# Patient Record
Sex: FEMALE | Race: OTHER | HISPANIC OR LATINO | URBAN - METROPOLITAN AREA
[De-identification: names, ages, dates, MRNs, and addresses within clinical notes are randomized per-mention and may not be internally consistent; named-entity substitution may affect disease eponyms.]

---

## 2017-01-02 ENCOUNTER — EMERGENCY (EMERGENCY)
Facility: HOSPITAL | Age: 22
LOS: 0 days | Discharge: HOME | End: 2017-01-02
Admitting: INTERNAL MEDICINE
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
## 2017-06-27 DIAGNOSIS — M54.2 CERVICALGIA: ICD-10-CM
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
## 2017-06-27 DIAGNOSIS — S00.03XA CONTUSION OF SCALP, INITIAL ENCOUNTER: ICD-10-CM

## 2017-06-27 DIAGNOSIS — M54.6 PAIN IN THORACIC SPINE: ICD-10-CM

## 2017-06-27 DIAGNOSIS — R05 COUGH: ICD-10-CM
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
## 2018-07-05 ENCOUNTER — INPATIENT (INPATIENT)
Facility: HOSPITAL | Age: 23
LOS: 2 days | Discharge: HOME | End: 2018-07-08
Attending: SURGERY | Admitting: SURGERY
Payer: COMMERCIAL

## 2018-07-05 VITALS
DIASTOLIC BLOOD PRESSURE: 75 MMHG | SYSTOLIC BLOOD PRESSURE: 135 MMHG | OXYGEN SATURATION: 100 % | RESPIRATION RATE: 18 BRPM | HEART RATE: 109 BPM | TEMPERATURE: 100 F

## 2018-07-05 DIAGNOSIS — K83.0 CHOLANGITIS: ICD-10-CM

## 2018-07-05 LAB
ALBUMIN SERPL ELPH-MCNC: 3.9 G/DL — SIGNIFICANT CHANGE UP (ref 3.5–5.2)
ALBUMIN SERPL ELPH-MCNC: 4.4 G/DL — SIGNIFICANT CHANGE UP (ref 3.5–5.2)
ALP SERPL-CCNC: 123 U/L — HIGH (ref 30–115)
ALP SERPL-CCNC: 135 U/L — HIGH (ref 30–115)
ALT FLD-CCNC: 438 U/L — HIGH (ref 0–41)
ALT FLD-CCNC: 610 U/L — HIGH (ref 0–41)
ANION GAP SERPL CALC-SCNC: 14 MMOL/L — SIGNIFICANT CHANGE UP (ref 7–14)
ANION GAP SERPL CALC-SCNC: 16 MMOL/L — HIGH (ref 7–14)
ANISOCYTOSIS BLD QL: SLIGHT — SIGNIFICANT CHANGE UP
APPEARANCE UR: (no result)
APTT BLD: 30 SEC — SIGNIFICANT CHANGE UP (ref 27–39.2)
AST SERPL-CCNC: 358 U/L — HIGH (ref 0–41)
BASOPHILS # BLD AUTO: 0 K/UL — SIGNIFICANT CHANGE UP (ref 0–0.2)
BASOPHILS # BLD AUTO: 0.02 K/UL — SIGNIFICANT CHANGE UP (ref 0–0.2)
BASOPHILS NFR BLD AUTO: 0 % — SIGNIFICANT CHANGE UP (ref 0–1)
BASOPHILS NFR BLD AUTO: 0.1 % — SIGNIFICANT CHANGE UP (ref 0–1)
BILIRUB DIRECT SERPL-MCNC: 1.1 MG/DL — HIGH (ref 0–0.2)
BILIRUB DIRECT SERPL-MCNC: 2.6 MG/DL — HIGH (ref 0–0.2)
BILIRUB INDIRECT FLD-MCNC: 0.5 MG/DL — SIGNIFICANT CHANGE UP (ref 0.2–1.2)
BILIRUB INDIRECT FLD-MCNC: 0.5 MG/DL — SIGNIFICANT CHANGE UP (ref 0.2–1.2)
BILIRUB SERPL-MCNC: 1.6 MG/DL — HIGH (ref 0.2–1.2)
BILIRUB SERPL-MCNC: 3.1 MG/DL — HIGH (ref 0.2–1.2)
BILIRUB UR-MCNC: NEGATIVE — SIGNIFICANT CHANGE UP
BUN SERPL-MCNC: 11 MG/DL — SIGNIFICANT CHANGE UP (ref 10–20)
BUN SERPL-MCNC: 9 MG/DL — LOW (ref 10–20)
CALCIUM SERPL-MCNC: 8.3 MG/DL — LOW (ref 8.5–10.1)
CALCIUM SERPL-MCNC: 9.3 MG/DL — SIGNIFICANT CHANGE UP (ref 8.5–10.1)
CHLORIDE SERPL-SCNC: 102 MMOL/L — SIGNIFICANT CHANGE UP (ref 98–110)
CHLORIDE SERPL-SCNC: 103 MMOL/L — SIGNIFICANT CHANGE UP (ref 98–110)
CO2 SERPL-SCNC: 24 MMOL/L — SIGNIFICANT CHANGE UP (ref 17–32)
CO2 SERPL-SCNC: 25 MMOL/L — SIGNIFICANT CHANGE UP (ref 17–32)
COLOR SPEC: YELLOW — SIGNIFICANT CHANGE UP
CREAT SERPL-MCNC: 0.6 MG/DL — LOW (ref 0.7–1.5)
CREAT SERPL-MCNC: 0.7 MG/DL — SIGNIFICANT CHANGE UP (ref 0.7–1.5)
DIFF PNL FLD: NEGATIVE — SIGNIFICANT CHANGE UP
ELLIPTOCYTES BLD QL SMEAR: SLIGHT — SIGNIFICANT CHANGE UP
EOSINOPHIL # BLD AUTO: 0 K/UL — SIGNIFICANT CHANGE UP (ref 0–0.7)
EOSINOPHIL # BLD AUTO: 0 K/UL — SIGNIFICANT CHANGE UP (ref 0–0.7)
EOSINOPHIL NFR BLD AUTO: 0 % — SIGNIFICANT CHANGE UP (ref 0–8)
EOSINOPHIL NFR BLD AUTO: 0 % — SIGNIFICANT CHANGE UP (ref 0–8)
GLUCOSE SERPL-MCNC: 119 MG/DL — HIGH (ref 70–99)
GLUCOSE SERPL-MCNC: 125 MG/DL — HIGH (ref 70–99)
GLUCOSE UR QL: NEGATIVE MG/DL — SIGNIFICANT CHANGE UP
HCT VFR BLD CALC: 35.4 % — LOW (ref 37–47)
HCT VFR BLD CALC: 38.7 % — SIGNIFICANT CHANGE UP (ref 37–47)
HGB BLD-MCNC: 11.7 G/DL — LOW (ref 12–16)
HGB BLD-MCNC: 12.7 G/DL — SIGNIFICANT CHANGE UP (ref 12–16)
HYPOCHROMIA BLD QL: SLIGHT — SIGNIFICANT CHANGE UP
IMM GRANULOCYTES NFR BLD AUTO: 0.4 % — HIGH (ref 0.1–0.3)
INR BLD: 1.44 RATIO — HIGH (ref 0.65–1.3)
KETONES UR-MCNC: NEGATIVE — SIGNIFICANT CHANGE UP
LACTATE SERPL-SCNC: 1.6 MMOL/L — SIGNIFICANT CHANGE UP (ref 0.5–2.2)
LEUKOCYTE ESTERASE UR-ACNC: NEGATIVE — SIGNIFICANT CHANGE UP
LIDOCAIN IGE QN: 29 U/L — SIGNIFICANT CHANGE UP (ref 7–60)
LYMPHOCYTES # BLD AUTO: 0.41 K/UL — LOW (ref 1.2–3.4)
LYMPHOCYTES # BLD AUTO: 0.65 K/UL — LOW (ref 1.2–3.4)
LYMPHOCYTES # BLD AUTO: 2.3 % — LOW (ref 20.5–51.1)
LYMPHOCYTES # BLD AUTO: 3 % — LOW (ref 20.5–51.1)
MAGNESIUM SERPL-MCNC: 1.5 MG/DL — LOW (ref 1.8–2.4)
MANUAL SMEAR VERIFICATION: SIGNIFICANT CHANGE UP
MCHC RBC-ENTMCNC: 27.2 PG — SIGNIFICANT CHANGE UP (ref 27–31)
MCHC RBC-ENTMCNC: 27.6 PG — SIGNIFICANT CHANGE UP (ref 27–31)
MCHC RBC-ENTMCNC: 32.8 G/DL — SIGNIFICANT CHANGE UP (ref 32–37)
MCHC RBC-ENTMCNC: 33.1 G/DL — SIGNIFICANT CHANGE UP (ref 32–37)
MCV RBC AUTO: 82.9 FL — SIGNIFICANT CHANGE UP (ref 81–99)
MCV RBC AUTO: 83.5 FL — SIGNIFICANT CHANGE UP (ref 81–99)
MONOCYTES # BLD AUTO: 0.22 K/UL — SIGNIFICANT CHANGE UP (ref 0.1–0.6)
MONOCYTES # BLD AUTO: 0.95 K/UL — HIGH (ref 0.1–0.6)
MONOCYTES NFR BLD AUTO: 1 % — LOW (ref 1.7–9.3)
MONOCYTES NFR BLD AUTO: 5.3 % — SIGNIFICANT CHANGE UP (ref 1.7–9.3)
NEUTROPHILS # BLD AUTO: 16.35 K/UL — HIGH (ref 1.4–6.5)
NEUTROPHILS # BLD AUTO: 20.88 K/UL — HIGH (ref 1.4–6.5)
NEUTROPHILS NFR BLD AUTO: 91.9 % — HIGH (ref 42.2–75.2)
NEUTROPHILS NFR BLD AUTO: 95 % — HIGH (ref 42.2–75.2)
NEUTS BAND # BLD: 1 % — SIGNIFICANT CHANGE UP (ref 0–6)
NITRITE UR-MCNC: NEGATIVE — SIGNIFICANT CHANGE UP
NRBC # BLD: 0 /100 — SIGNIFICANT CHANGE UP (ref 0–0)
NRBC # BLD: SIGNIFICANT CHANGE UP /100 WBCS (ref 0–0)
PH UR: 8 — SIGNIFICANT CHANGE UP (ref 5–8)
PHOSPHATE SERPL-MCNC: 4.3 MG/DL — SIGNIFICANT CHANGE UP (ref 2.1–4.9)
PLAT MORPH BLD: SIGNIFICANT CHANGE UP
PLATELET # BLD AUTO: 223 K/UL — SIGNIFICANT CHANGE UP (ref 130–400)
PLATELET # BLD AUTO: 236 K/UL — SIGNIFICANT CHANGE UP (ref 130–400)
POTASSIUM SERPL-MCNC: 3.7 MMOL/L — SIGNIFICANT CHANGE UP (ref 3.5–5)
POTASSIUM SERPL-MCNC: 3.7 MMOL/L — SIGNIFICANT CHANGE UP (ref 3.5–5)
POTASSIUM SERPL-SCNC: 3.7 MMOL/L — SIGNIFICANT CHANGE UP (ref 3.5–5)
POTASSIUM SERPL-SCNC: 3.7 MMOL/L — SIGNIFICANT CHANGE UP (ref 3.5–5)
PROT SERPL-MCNC: 6.1 G/DL — SIGNIFICANT CHANGE UP (ref 6–8)
PROT SERPL-MCNC: 7.3 G/DL — SIGNIFICANT CHANGE UP (ref 6–8)
PROT UR-MCNC: NEGATIVE MG/DL — SIGNIFICANT CHANGE UP
PROTHROM AB SERPL-ACNC: 15.5 SEC — HIGH (ref 9.95–12.87)
RBC # BLD: 4.24 M/UL — SIGNIFICANT CHANGE UP (ref 4.2–5.4)
RBC # BLD: 4.67 M/UL — SIGNIFICANT CHANGE UP (ref 4.2–5.4)
RBC # FLD: 14.5 % — SIGNIFICANT CHANGE UP (ref 11.5–14.5)
RBC # FLD: 14.7 % — HIGH (ref 11.5–14.5)
RBC BLD AUTO: NORMAL — SIGNIFICANT CHANGE UP
SODIUM SERPL-SCNC: 141 MMOL/L — SIGNIFICANT CHANGE UP (ref 135–146)
SODIUM SERPL-SCNC: 143 MMOL/L — SIGNIFICANT CHANGE UP (ref 135–146)
SP GR SPEC: 1.01 — SIGNIFICANT CHANGE UP (ref 1.01–1.03)
UROBILINOGEN FLD QL: 1 MG/DL (ref 0.2–0.2)
WBC # BLD: 17.81 K/UL — HIGH (ref 4.8–10.8)
WBC # BLD: 21.75 K/UL — HIGH (ref 4.8–10.8)
WBC # FLD AUTO: 17.81 K/UL — HIGH (ref 4.8–10.8)
WBC # FLD AUTO: 21.75 K/UL — HIGH (ref 4.8–10.8)

## 2018-07-05 PROCEDURE — 99223 1ST HOSP IP/OBS HIGH 75: CPT

## 2018-07-05 RX ORDER — MORPHINE SULFATE 50 MG/1
2 CAPSULE, EXTENDED RELEASE ORAL EVERY 4 HOURS
Qty: 0 | Refills: 0 | Status: DISCONTINUED | OUTPATIENT
Start: 2018-07-05 | End: 2018-07-07

## 2018-07-05 RX ORDER — AMPICILLIN SODIUM AND SULBACTAM SODIUM 250; 125 MG/ML; MG/ML
3 INJECTION, POWDER, FOR SUSPENSION INTRAMUSCULAR; INTRAVENOUS ONCE
Qty: 0 | Refills: 0 | Status: COMPLETED | OUTPATIENT
Start: 2018-07-05 | End: 2018-07-05

## 2018-07-05 RX ORDER — HEPARIN SODIUM 5000 [USP'U]/ML
5000 INJECTION INTRAVENOUS; SUBCUTANEOUS EVERY 8 HOURS
Qty: 0 | Refills: 0 | Status: DISCONTINUED | OUTPATIENT
Start: 2018-07-05 | End: 2018-07-05

## 2018-07-05 RX ORDER — FAMOTIDINE 10 MG/ML
20 INJECTION INTRAVENOUS ONCE
Qty: 0 | Refills: 0 | Status: COMPLETED | OUTPATIENT
Start: 2018-07-05 | End: 2018-07-05

## 2018-07-05 RX ORDER — MORPHINE SULFATE 50 MG/1
4 CAPSULE, EXTENDED RELEASE ORAL EVERY 4 HOURS
Qty: 0 | Refills: 0 | Status: DISCONTINUED | OUTPATIENT
Start: 2018-07-05 | End: 2018-07-07

## 2018-07-05 RX ORDER — MORPHINE SULFATE 50 MG/1
2 CAPSULE, EXTENDED RELEASE ORAL ONCE
Qty: 0 | Refills: 0 | Status: DISCONTINUED | OUTPATIENT
Start: 2018-07-05 | End: 2018-07-05

## 2018-07-05 RX ORDER — AMPICILLIN SODIUM AND SULBACTAM SODIUM 250; 125 MG/ML; MG/ML
3 INJECTION, POWDER, FOR SUSPENSION INTRAMUSCULAR; INTRAVENOUS EVERY 6 HOURS
Qty: 0 | Refills: 0 | Status: DISCONTINUED | OUTPATIENT
Start: 2018-07-06 | End: 2018-07-06

## 2018-07-05 RX ORDER — CEFOTETAN DISODIUM 1 G
1 VIAL (EA) INJECTION ONCE
Qty: 0 | Refills: 0 | Status: COMPLETED | OUTPATIENT
Start: 2018-07-05 | End: 2018-07-05

## 2018-07-05 RX ORDER — ONDANSETRON 8 MG/1
4 TABLET, FILM COATED ORAL ONCE
Qty: 0 | Refills: 0 | Status: COMPLETED | OUTPATIENT
Start: 2018-07-05 | End: 2018-07-05

## 2018-07-05 RX ORDER — PANTOPRAZOLE SODIUM 20 MG/1
40 TABLET, DELAYED RELEASE ORAL
Qty: 0 | Refills: 0 | Status: DISCONTINUED | OUTPATIENT
Start: 2018-07-05 | End: 2018-07-07

## 2018-07-05 RX ORDER — SODIUM CHLORIDE 9 MG/ML
1000 INJECTION INTRAMUSCULAR; INTRAVENOUS; SUBCUTANEOUS ONCE
Qty: 0 | Refills: 0 | Status: COMPLETED | OUTPATIENT
Start: 2018-07-05 | End: 2018-07-05

## 2018-07-05 RX ORDER — SODIUM CHLORIDE 9 MG/ML
1000 INJECTION INTRAMUSCULAR; INTRAVENOUS; SUBCUTANEOUS
Qty: 0 | Refills: 0 | Status: DISCONTINUED | OUTPATIENT
Start: 2018-07-05 | End: 2018-07-07

## 2018-07-05 RX ORDER — ACETAMINOPHEN 500 MG
650 TABLET ORAL EVERY 6 HOURS
Qty: 0 | Refills: 0 | Status: DISCONTINUED | OUTPATIENT
Start: 2018-07-05 | End: 2018-07-07

## 2018-07-05 RX ORDER — ONDANSETRON 8 MG/1
4 TABLET, FILM COATED ORAL EVERY 6 HOURS
Qty: 0 | Refills: 0 | Status: DISCONTINUED | OUTPATIENT
Start: 2018-07-05 | End: 2018-07-07

## 2018-07-05 RX ORDER — AMPICILLIN SODIUM AND SULBACTAM SODIUM 250; 125 MG/ML; MG/ML
INJECTION, POWDER, FOR SUSPENSION INTRAMUSCULAR; INTRAVENOUS
Qty: 0 | Refills: 0 | Status: DISCONTINUED | OUTPATIENT
Start: 2018-07-05 | End: 2018-07-06

## 2018-07-05 RX ORDER — KETOROLAC TROMETHAMINE 30 MG/ML
15 SYRINGE (ML) INJECTION ONCE
Qty: 0 | Refills: 0 | Status: DISCONTINUED | OUTPATIENT
Start: 2018-07-05 | End: 2018-07-05

## 2018-07-05 RX ADMIN — MORPHINE SULFATE 4 MILLIGRAM(S): 50 CAPSULE, EXTENDED RELEASE ORAL at 19:43

## 2018-07-05 RX ADMIN — SODIUM CHLORIDE 1000 MILLILITER(S): 9 INJECTION INTRAMUSCULAR; INTRAVENOUS; SUBCUTANEOUS at 10:34

## 2018-07-05 RX ADMIN — MORPHINE SULFATE 4 MILLIGRAM(S): 50 CAPSULE, EXTENDED RELEASE ORAL at 23:49

## 2018-07-05 RX ADMIN — AMPICILLIN SODIUM AND SULBACTAM SODIUM 200 GRAM(S): 250; 125 INJECTION, POWDER, FOR SUSPENSION INTRAMUSCULAR; INTRAVENOUS at 17:00

## 2018-07-05 RX ADMIN — SODIUM CHLORIDE 100 MILLILITER(S): 9 INJECTION INTRAMUSCULAR; INTRAVENOUS; SUBCUTANEOUS at 23:36

## 2018-07-05 RX ADMIN — FAMOTIDINE 20 MILLIGRAM(S): 10 INJECTION INTRAVENOUS at 10:34

## 2018-07-05 RX ADMIN — Medication 650 MILLIGRAM(S): at 19:42

## 2018-07-05 RX ADMIN — Medication 100 GRAM(S): at 14:57

## 2018-07-05 RX ADMIN — Medication 15 MILLIGRAM(S): at 10:34

## 2018-07-05 RX ADMIN — SODIUM CHLORIDE 100 MILLILITER(S): 9 INJECTION INTRAMUSCULAR; INTRAVENOUS; SUBCUTANEOUS at 15:57

## 2018-07-05 RX ADMIN — ONDANSETRON 4 MILLIGRAM(S): 8 TABLET, FILM COATED ORAL at 10:34

## 2018-07-05 RX ADMIN — AMPICILLIN SODIUM AND SULBACTAM SODIUM 200 GRAM(S): 250; 125 INJECTION, POWDER, FOR SUSPENSION INTRAMUSCULAR; INTRAVENOUS at 23:36

## 2018-07-05 RX ADMIN — MORPHINE SULFATE 2 MILLIGRAM(S): 50 CAPSULE, EXTENDED RELEASE ORAL at 14:45

## 2018-07-05 NOTE — ED ADULT TRIAGE NOTE - CHIEF COMPLAINT QUOTE
abdominal pain radiating to back , nausea, /vomiting, patient states that she was told she has gallstones

## 2018-07-05 NOTE — ED PROVIDER NOTE - PROGRESS NOTE DETAILS
ATTENDING NOTE:   22 y/o F with PMH of known gallstones presents with c/o epigastric pain radiating to her right back that started last night after eating spicy food and drinking coffee. No CP, SOB, diarrhea, vaginal bleeding, urinary SX.    On exam: Pt is well-appearing, NAD, WDWN, patient sitting up in bed, providing appropriate history. NCAT. PERRLA 3mm b/l, no nystagmus, EOMI. HEENT normal, no pooling of secretions. +Full passive ROM in neck. S1S2, no MRG. CTABL, no WRC. Abdomen soft, ND, (+)RUQ TTP, (+)Sahni’s sign, no rebound or guarding, no CVAT. No leg edema, +symmetric pulses b/l. CN2-12 grossly intact. No rash.  Will give IVF, symptomatic control, get labs, imaging and reassess.

## 2018-07-05 NOTE — ED ADULT NURSE NOTE - OBJECTIVE STATEMENT
patient c/o abdominal pain since last night with n/v. denies fevers/chills. states she was dx with gall stones about 2 years ago but never followed up and hasn't had pain since then. denies any urinary symptoms

## 2018-07-05 NOTE — H&P ADULT - ASSESSMENT
24 y/o female patient with h/o cholelithiasis x1 year, presenting with 1 day h/o progressively worsening abdominal pain RUQ, WBC 17.81, cholelithiasis on US RUQ, tender on exam, C/w acute cholecystitis    Plan:  NPO  IVF  Pain control  Need PT/INR/PTT  Need T+S  Will discuss surgery for gallbladder  IV abx: Unasyn 22 y/o female patient with h/o cholelithiasis x1 year, presenting with 1 day h/o progressively worsening abdominal pain RUQ, WBC 17.81, cholelithiasis on US RUQ, tender on exam, C/w acute cholecystitis    Plan:  NPO  IVF  Pain control  Need PT/INR/PTT  Need T+S  Will discuss surgery for gallbladder  IV abx: Unasyn  GI consult MRCP/ERCP 22 y/o female patient with h/o cholelithiasis x1 year, presenting with 1 day h/o progressively worsening abdominal pain RUQ, WBC 17.81, cholelithiasis on US RUQ, tender on exam, C/w acute cholecystitis    Plan:  NPO  IVF  Pain control  Will discuss surgery for gallbladder  IV abx: Unasyn  GI consult MRCP/ERCP

## 2018-07-05 NOTE — CONSULT NOTE ADULT - SUBJECTIVE AND OBJECTIVE BOX
24yo F with PMHx gallstones , anemia, p/w sharp RUQ/epigastric pain since last night. Pt states she was worked up for gallstones last year but never followed up, pain exacerbated by fatty and spicy foods in the past, ate spicy food last night with worsening RUQ pain. Radiates to mid back and right shoulder. Assoc with nausea, and total of 6 non-biliary nonbloody emesis since 6 am today. Patient also had 2 episodes of diarrhea, non bloody. Endorses subjective fever, has low grade temp (99.9) in ED. Denies headache, lightheadedness, CP, SOB, dysuria, LMP 1 week ago. No vaginal bleeding or discharge.     vitals: ICU Vital Signs Last 24 Hrs  T(C): 37.7 (05 Jul 2018 09:51), Max: 37.7 (05 Jul 2018 09:51)  T(F): 99.9 (05 Jul 2018 09:51), Max: 99.9 (05 Jul 2018 09:51)  HR: 109 (05 Jul 2018 09:51) (109 - 109)  BP: 135/75 (05 Jul 2018 09:51) (135/75 - 135/75)  RR: 18 (05 Jul 2018 09:51) (18 - 18)  SpO2: 100% (05 Jul 2018 09:51) (100% - 100%)    PE:   GENERAL: Patient well appearing, NAD  	HEAD: NCAT  	EYES: No scleral icterus. Conjunctival clear  RESPIRATORY: Normal respiratory effort. CTA B/L. No wheezing, rales, rhonchi  CARDIOVASCULAR: Regular rate and rhythm. Normal S1/S2. No murmurs, rubs or gallops  	ABDOMEN: Soft.obese. Nondistended. epigastric and RUQ TTP, + Sahni's sign. No guarding or rebound. No CVA tenderness  	MUSCULOSKELETAL/EXTREMITIES: Brisk cap refill. 2+ radial pulses. No leg edema  	SKIN:  Warm and dry  	NEURO: AAOx3. No gross FND  PSYCHIATRIC: Cooperative. Affect appropriate    Labs: Hepatic Function Panel (07.05.18 @ 10:25)    Indirect Reacting Bilirubin: 0.5 mg/dL    Protein Total, Serum: 7.3 g/dL    Albumin, Serum: 4.4 g/dL    Bilirubin Total, Serum: 1.6 mg/dL    Bilirubin Direct, Serum: 1.1 mg/dL    Alkaline Phosphatase, Serum: 135 U/L    Alanine Aminotransferase (ALT/SGPT): 610 U/L  Complete Blood Count + Automated Diff (07.05.18 @ 10:25)    WBC Count: 17.81 K/uL    RBC Count: 4.67 M/uL    Hemoglobin: 12.7 g/dL    Hematocrit: 38.7 %    Mean Cell Volume: 82.9 fL    Mean Cell Hemoglobin: 27.2 pg    Mean Cell Hemoglobin Conc: 32.8 g/dL    Red Cell Distrib Width: 14.5 %    Platelet Count - Automated: 236 K/uL    Auto Neutrophil #: 16.35 K/uL    Auto Lymphocyte #: 0.41 K/uL    Auto Monocyte #: 0.95 K/uL    Auto Eosinophil #: 0.00 K/uL    Auto Basophil #: 0.02 K/uL    Auto Neutrophil %: 91.9: Differential percentages must be correlated with absolute numbers for  clinical significance. %    Auto Lymphocyte %: 2.3 %    Auto Monocyte %: 5.3 %    Auto Eosinophil %: 0.0 %    Auto Basophil %: 0.1 %    Auto Immature Granulocyte %: 0.4 %    Imaging:  < from: US Abdomen Limited (07.05.18 @ 12:39) >  IMPRESSION:    Cholelithiasis and gallbladder sludge without sonographic evidence of   acute cholecystitis.    Minimally dilated proximal CBD (6.2 mm). If clinically warranted, MRCP   can be obtained to exclude a CBD stone.      < end of copied text > HPI:  24yo F with PMHx gallstones x1year biliary colic, Fe++ def anemia, p/w sharp RUQ/epigastric pain since last night. Pt states she was worked up for gallstones last year but never followed up, pain exacerbated by fatty and spicy foods in the past, ate spicy food last night with worsening RUQ pain. Radiates to mid back and right shoulder. Assoc with nausea, and total of 6 non-biliary nonbloody emesis since 6 am today. Patient also had 2 episodes of diarrhea, non bloody. Endorses subjective fever, has low grade temp (99.9) in ED. Denies headache, lightheadedness, CP, SOB, dysuria, LMP 1 week ago. No vaginal bleeding or discharge.     vitals: ICU Vital Signs Last 24 Hrs  T(C): 37.7 (05 Jul 2018 09:51), Max: 37.7 (05 Jul 2018 09:51)  T(F): 99.9 (05 Jul 2018 09:51), Max: 99.9 (05 Jul 2018 09:51)  HR: 109 (05 Jul 2018 09:51) (109 - 109)  BP: 135/75 (05 Jul 2018 09:51) (135/75 - 135/75)  RR: 18 (05 Jul 2018 09:51) (18 - 18)  SpO2: 100% (05 Jul 2018 09:51) (100% - 100%)    PE:   GENERAL: Patient well appearing, NAD  	HEAD: NCAT  	EYES: No scleral icterus. Conjunctival clear  RESPIRATORY: Normal respiratory effort. CTA B/L. No wheezing, rales, rhonchi  CARDIOVASCULAR: Regular rate and rhythm. Normal S1/S2. No murmurs, rubs or gallops  	ABDOMEN: Soft.obese. Nondistended. epigastric and RUQ Tenderness + Sahni's sign. No guarding or rebound. No CVA tenderness  	SKIN:  Warm and dry, no jaundice    Labs                        12.7   17.81 )-----------( 236      ( 05 Jul 2018 10:25 )             38.7   07-05    141  |  102  |  11  ----------------------------<  119<H>  3.7   |  25  |  0.6<L>    Ca    9.3      05 Jul 2018 10:25    TPro  7.3  /  Alb  4.4  /  TBili  1.6<H>  /  DBili  1.1<H>  /  AST  x   /  ALT  610<H>  /  AlkPhos  135<H>  07-05    Lactate, Blood (07.05.18 @ 10:25)    Lactate, Blood: 1.6 mmol/L      Imaging:  < from: US Abdomen Limited (07.05.18 @ 12:39) >  IMPRESSION:    Cholelithiasis and gallbladder sludge without sonographic evidence of   acute cholecystitis.    Minimally dilated proximal CBD (6.2 mm). If clinically warranted, MRCP   can be obtained to exclude a CBD stone.  < end of copied text >

## 2018-07-05 NOTE — H&P ADULT - NSHPPHYSICALEXAM_GEN_ALL_CORE
PE:   GENERAL: Patient well appearing, NAD  HEAD: NCAT  EYES: No scleral icterus. Conjunctival clear  RESPIRATORY: Normal respiratory effort. CTA B/L. No wheezing, rales, rhonchi  CARDIOVASCULAR: Regular rate and rhythm. Normal S1/S2. No murmurs, rubs or gallops  ABDOMEN: Soft.obese. Nondistended. epigastric and RUQ Tenderness + Sahni's sign. No guarding or rebound. No CVA tenderness  SKIN:  Warm and dry, no jaundice PE:   ICU Vital Signs Last 24 Hrs  T(C): 36.9 (05 Jul 2018 15:35), Max: 37.7 (05 Jul 2018 09:51)  T(F): 98.4 (05 Jul 2018 15:35), Max: 99.9 (05 Jul 2018 09:51)  HR: 113 (05 Jul 2018 15:35) (108 - 113)  BP: 139/79 (05 Jul 2018 15:35) (125/79 - 139/79)  RR: 18 (05 Jul 2018 15:35) (18 - 18)  SpO2: 98% (05 Jul 2018 15:35) (98% - 100%)    GENERAL: Patient well appearing, in moderate painfull distress  HEAD: NCAT  EYES: No scleral icterus. Conjunctival clear  RESPIRATORY: Normal respiratory effort. CTA B/L. No wheezing, rales, rhonchi  CARDIOVASCULAR: Regular rate and rhythm. Normal S1/S2. No murmurs, rubs or gallops  ABDOMEN: Soft.obese. Nondistended. epigastric and RUQ Tenderness + Sahni's sign. No guarding or rebound. No CVA tenderness  SKIN:  Warm and dry, no jaundice PE:   ICU Vital Signs Last 24 Hrs  T(C): 36.9 (05 Jul 2018 15:35), Max: 37.7 (05 Jul 2018 09:51)  T(F): 98.4 (05 Jul 2018 15:35), Max: 99.9 (05 Jul 2018 09:51)  HR: 113 (05 Jul 2018 15:35) (108 - 113)  BP: 139/79 (05 Jul 2018 15:35) (125/79 - 139/79)  RR: 18 (05 Jul 2018 15:35) (18 - 18)  SpO2: 98% (05 Jul 2018 15:35) (98% - 100%)    GENERAL: Patient well appearing, in moderate pain and  distress  HEAD: NCAT  EYES: No scleral icterus. Conjunctival clear  RESPIRATORY: Normal respiratory effort. CTA B/L. No wheezing, rales, rhonchi  CARDIOVASCULAR: Regular rate and rhythm. Normal S1/S2. No murmurs, rubs or gallops  ABDOMEN: Soft.obese. Nondistended. epigastric and RUQ Tenderness + Sahni's sign. palpable  gallbladder RUQ, No guarding or rebound. No CVA tenderness  SKIN:  Warm and dry, no jaundice

## 2018-07-05 NOTE — ED PROVIDER NOTE - NS ED ROS FT
Constitutional: No fever  ENMT:  No neck pain  Cardiac:  No chest pain  Respiratory:  No cough, SOB  GI:  +nausea, vomiting, abdominal pain. No diarrhea  :  No dysuria  MS:  No flank pain  Neuro:  No headache or lightheadedness  Skin:  No skin rash  Endocrine: No history of thyroid disease or diabetes

## 2018-07-05 NOTE — ED PROVIDER NOTE - OBJECTIVE STATEMENT
24yo F with PMHx gallstones, anemia, p/w RUQ/epigastric pain since last night. Pt states she was worked up for gallstones last year but never followed up, pain exacerbated by spicy foods in the past, ate spicy food last night with worse RUQ pain. Radiates to mid back and right shoulder. Assoc with nausea and nonbloody emesis. Subjective fever, has low grade temp (99.9) in ED. Denies headache, lightheadedness, CP, SOB, cough, dysuria, leg swelling. LMP 1 week ago. No vaginal bleeding or discharge.

## 2018-07-05 NOTE — CONSULT NOTE ADULT - ASSESSMENT
24 y/o female patient with h/o cholelithiasis x1 year, presenting with 1 day h/o progressively worsening abdominal pain RUQ, WBC 17.81, cholelithiasis on US RUQ, tender on exam, C/w acute cholecystitis    Plan:  NPO  IVF  Pain control  Need PT/INR/PTT  Need T+S  Will discuss surgery for gallbladder  IV abx: Unasyn    Will discuss with surgical attending 24 y/o female patient with h/o cholelithiasis x1 year, presenting with 1 day h/o progressively worsening abdominal pain RUQ, WBC 17.81, cholelithiasis on US RUQ, tender on exam, C/w acute cholecystitis    Plan:  NPO  IVF  Pain control  Need PT/INR/PTT  IV abx: Unasyn

## 2018-07-05 NOTE — H&P ADULT - HISTORY OF PRESENT ILLNESS
Patient is 24yo F with PMHx gallstones x1year biliary colic, Fe++ def anemia, p/w sharp RUQ/epigastric pain since last night. Pt states she was worked up for gallstones last year but never followed up, pain exacerbated by fatty and spicy foods in the past, ate spicy food last night with worsening RUQ pain. Radiates to mid back and right shoulder. Assoc with nausea, and total of 6 non-biliary nonbloody emesis since 6 am today. Patient also had 2 episodes of diarrhea, non bloody. Endorses subjective fever, has low grade temp (99.9) in ED. Denies headache, lightheadedness, CP, SOB, dysuria, LMP 1 week ago. No vaginal bleeding or discharge. Patient is 22yo F with PMHx gallstones x1year biliary colic, Fe++ def anemia, p/w sharp RUQ/epigastric pain since last night. Pt states she was worked up for gallstones last year and followed up by surgeon Dr. Jason,but did not had surgery. pain exacerbated by fatty and spicy foods in the past, ate spicy food last night with worsening RUQ pain. Radiates to mid back and right shoulder. Assoc with nausea, and total of 6 non-biliary nonbloody emesis since 6 am today. Patient also had 2 episodes of diarrhea, non bloody. Endorses subjective fever, has low grade temp (99.9) in ED. Denies headache, lightheadedness, CP, SOB, dysuria, LMP 1 week ago. No vaginal bleeding or discharge.

## 2018-07-05 NOTE — H&P ADULT - NSHPLABSRESULTS_GEN_ALL_CORE
Labs: Hepatic Function Panel (07.05.18 @ 10:25)    Indirect Reacting Bilirubin: 0.5 mg/dL    Protein Total, Serum: 7.3 g/dL    Albumin, Serum: 4.4 g/dL    Bilirubin Total, Serum: 1.6 mg/dL    Bilirubin Direct, Serum: 1.1 mg/dL    Alkaline Phosphatase, Serum: 135 U/L    Alanine Aminotransferase (ALT/SGPT): 610 U/L  Urinalysis (07.05.18 @ 10:25)    pH Urine: 8.0    Glucose Qualitative, Urine: Negative mg/dL    Blood, Urine: Negative    Color: Yellow    Urine Appearance: Cloudy    Bilirubin: Negative    Ketone - Urine: Negative    Specific Gravity: 1.015    Protein, Urine: Negative mg/dL    Urobilinogen: 1.0 mg/dL    Nitrite: Negative    Leukocyte Esterase Concentration: Negative  Lipase, Serum (07.05.18 @ 10:25)    Lipase, Serum: 29 U/L  Complete Blood Count + Automated Diff (07.05.18 @ 10:25)    WBC Count: 17.81 K/uL    RBC Count: 4.67 M/uL    Hemoglobin: 12.7 g/dL    Hematocrit: 38.7 %    Mean Cell Volume: 82.9 fL    Mean Cell Hemoglobin: 27.2 pg    Mean Cell Hemoglobin Conc: 32.8 g/dL    Red Cell Distrib Width: 14.5 %    Platelet Count - Automated: 236 K/uL    Auto Neutrophil #: 16.35 K/uL    Auto Lymphocyte #: 0.41 K/uL    Auto Monocyte #: 0.95 K/uL    Auto Eosinophil #: 0.00 K/uL    Auto Basophil #: 0.02 K/uL    Auto Neutrophil %: 91.9: Differential percentages must be correlated with absolute numbers for  clinical significance. %    Auto Lymphocyte %: 2.3 %    Auto Monocyte %: 5.3 %    Auto Eosinophil %: 0.0 %    Auto Basophil %: 0.1 %    Auto Immature Granulocyte %: 0.4 %  Imaging:   < from: US Abdomen Limited (07.05.18 @ 12:39) >    IMPRESSION:    Cholelithiasis and gallbladder sludge without sonographic evidence of   acute cholecystitis.    Minimally dilated proximal CBD (6.2 mm). If clinically warranted, MRCP   can be obtained to exclude a CBD stone.      < end of copied text > Labs:              12.7   17.81 )-----------( 236      ( 05 Jul 2018 10:25 )             38.7   07-05    141  |  102  |  11  ----------------------------<  119<H>  3.7   |  25  |  0.6<L>    Ca    9.3      05 Jul 2018 10:25    TPro  7.3  /  Alb  4.4  /  TBili  1.6<H>  /  DBili  1.1<H>  /  AST  x   /  ALT  610<H>  /  AlkPhos  135<H>  07-05    Lactate, Blood (07.05.18 @ 10:25)    Lactate, Blood: 1.6 mmol/L    Imaging:  < from: US Abdomen Limited (07.05.18 @ 12:39) >  IMPRESSION:    Cholelithiasis and gallbladder sludge without sonographic evidence of   acute cholecystitis.    Minimally dilated proximal CBD (6.2 mm). If clinically warranted, MRCP   can be obtained to exclude a CBD stone.  < end of copied text >

## 2018-07-05 NOTE — CONSULT NOTE ADULT - SUBJECTIVE AND OBJECTIVE BOX
Chief Complaint:  Patient is a 23y old  Female who presents with a chief complaint of abdominal pain (2018 15:58)      HPI:  22yo F with PMHx gallstones x1year biliary colic, Fe++ def anemia, p/w sharp RUQ/epigastric pain since last night. Pt states she was worked up for gallstones last year but never followed up, pain exacerbated by fatty and spicy foods in the past, ate spicy food last night with worsening RUQ pain. Radiates to mid back and right shoulder. Assoc with nausea, and total of 6 non-biliary nonbloody emesis since 6 am today. Patient also had 2 episodes of diarrhea, non bloody. Endorses subjective fever, has low grade temp (99.9) in ED. Denies headache, lightheadedness, CP, SOB, dysuria, LMP 1 week ago. No vaginal bleeding or discharge.     Allergies:  No Known Allergies    Hospital Medications:  acetaminophen   Tablet. 650 milliGRAM(s) Oral every 6 hours PRN  ampicillin/sulbactam  IVPB      morphine  - Injectable 2 milliGRAM(s) IV Push every 4 hours PRN  morphine  - Injectable 4 milliGRAM(s) IV Push every 4 hours PRN  ondansetron Injectable 4 milliGRAM(s) IV Push every 6 hours PRN  pantoprazole   Suspension 40 milliGRAM(s) Oral before breakfast  sodium chloride 0.9%. 1000 milliLiter(s) IV Continuous <Continuous>      PMHX/PSHX:  Other iron deficiency anemia  Calculus of gallbladder with cholecystitis without biliary obstruction, unspecified cholecystitis acuity  No pertinent past medical history  No significant past surgical history    ROS:   Eyes:  Good vision, no reported pain  ENT:  No sore throat, pain, runny nose, dysphagia  CV:  No pain, palpitations, hypo/hypertension  Resp:  No dyspnea, cough, tachypnea, wheezing  GI:  see H&P  :  No pain, bleeding, incontinence, nocturia  Muscle:  No pain, weakness  Neuro:  No weakness, tingling, memory problems  Psych:  No fatigue, insomnia, mood problems, depression  Endocrine:  No polyuria, polydipsia, cold/heat intolerance  Heme:  No petechiae, ecchymosis, easy bruisability  Skin:  No rash, tattoos, scars, edema      PHYSICAL EXAM:   Vital Signs:  Vital Signs Last 24 Hrs  T(C): 38.4 (2018 22:30), Max: 39.4 (2018 19:42)  T(F): 101.2 (2018 22:30), Max: 102.9 (2018 19:42)  HR: 91 (2018 22:30) (91 - 113)  BP: 111/53 (2018 22:30) (111/53 - 139/79)  BP(mean): --  RR: 18 (2018 22:30) (18 - 18)  SpO2: 98% (2018 15:35) (98% - 100%)  Daily Height in cm: 162.56 (2018 22:30)    Daily     GENERAL:  Appears stated age, well-groomed, well-nourished, no distress  HEENT:  NC/AT,  conjunctivae clear and pink, no thyromegaly, nodules, adenopathy, no JVD, sclera + icterus  CHEST:  Full & symmetric excursion, no increased effort, breath sounds clear  HEART:  Regular rhythm, S1, S2, no murmur/rub/S3/S4, no abdominal bruit, no edema  ABDOMEN:  Soft, RUQ tenderness, non-distended, normoactive bowel sounds,  no masses ,no hepato-splenomegaly,   SKIN:  No rash/erythema/ecchymoses/petechiae/wounds/abscess/warm/dry  NEURO:  Alert, oriented, no asterixis, no tremor, no encephalopathy    LABS:                        11.7   21.75 )-----------( 223      ( 2018 20:51 )             35.4     07-    143  |  103  |  9<L>  ----------------------------<  125<H>  3.7   |  24  |  0.7    Ca    8.3<L>      2018 20:51  Phos  4.3     07-05  Mg     1.5     07-05    TPro  6.1  /  Alb  3.9  /  TBili  3.1<H>  /  DBili  2.6<H>  /  AST  358<H>  /  ALT  438<H>  /  AlkPhos  123<H>  07-05    LIVER FUNCTIONS - ( 2018 20:51 )  Alb: 3.9 g/dL / Pro: 6.1 g/dL / ALK PHOS: 123 U/L / ALT: 438 U/L / AST: 358 U/L / GGT: x           PT/INR - ( 2018 20:51 )   PT: 15.50 sec;   INR: 1.44 ratio         PTT - ( 2018 20:51 )  PTT:30.0 sec  Urinalysis Basic - ( 2018 10:25 )    Color: Yellow / Appearance: Cloudy / S.015 / pH: x  Gluc: x / Ketone: Negative  / Bili: Negative / Urobili: 1.0 mg/dL   Blood: x / Protein: Negative mg/dL / Nitrite: Negative   Leuk Esterase: Negative / RBC: 1-2 /HPF / WBC 1-2 /HPF   Sq Epi: x / Non Sq Epi: Occasional /HPF / Bacteria: x      Amylase Serum--      Lipase serum29

## 2018-07-05 NOTE — ED PROVIDER NOTE - PHYSICAL EXAMINATION
Vital signs reviewed  GENERAL: Patient well appearing, NAD  HEAD: NCAT  EYES: No scleral icterus. Conjunctival clear  ENT: MMM  NECK: Supple, non tender  RESPIRATORY: Normal respiratory effort. CTA B/L. No wheezing, rales, rhonchi  CARDIOVASCULAR: Regular rate and rhythm. Normal S1/S2. No murmurs, rubs or gallops  ABDOMEN: Soft. Nondistended. RUQ TTP, +ve Sahni's sign. No guarding or rebound. No CVA tenderness  MUSCULOSKELETAL/EXTREMITIES: Brisk cap refill. 2+ radial pulses. No leg edema  SKIN:  Warm and dry  NEURO: AAOx3. No gross FND  PSYCHIATRIC: Cooperative. Affect appropriate

## 2018-07-05 NOTE — CONSULT NOTE ADULT - ASSESSMENT
23 year old female with insignificant PMH presenting with RUQ abdominal associated with fever, jaundice, imaging showing cholelithiasis with CBD dilation, picture suggestive of cholangitis.  - Cholangitis:  Keep NPO for now  monitor LFTs, WBCs and lactate  change Abx to zosyn  IV hydration  will arrange for ERCP in am.

## 2018-07-05 NOTE — CONSULT NOTE ADULT - ATTENDING COMMENTS
Patient seen and examined with surgery team on rounds and discussed management plan with patient.  Gi to perform ERCP today which was done and findings discussed with GI.

## 2018-07-06 LAB
ALBUMIN SERPL ELPH-MCNC: 3.6 G/DL — SIGNIFICANT CHANGE UP (ref 3.5–5.2)
ALBUMIN SERPL ELPH-MCNC: 3.7 G/DL — SIGNIFICANT CHANGE UP (ref 3.5–5.2)
ALP SERPL-CCNC: 108 U/L — SIGNIFICANT CHANGE UP (ref 30–115)
ALP SERPL-CCNC: 114 U/L — SIGNIFICANT CHANGE UP (ref 30–115)
ALT FLD-CCNC: 290 U/L — HIGH (ref 0–41)
ALT FLD-CCNC: 365 U/L — HIGH (ref 0–41)
ANION GAP SERPL CALC-SCNC: 15 MMOL/L — HIGH (ref 7–14)
APTT BLD: 31 SEC — SIGNIFICANT CHANGE UP (ref 27–39.2)
AST SERPL-CCNC: 117 U/L — HIGH (ref 0–41)
AST SERPL-CCNC: 245 U/L — HIGH (ref 0–41)
BASOPHILS # BLD AUTO: 0.01 K/UL — SIGNIFICANT CHANGE UP (ref 0–0.2)
BASOPHILS # BLD AUTO: 0.02 K/UL — SIGNIFICANT CHANGE UP (ref 0–0.2)
BASOPHILS NFR BLD AUTO: 0.1 % — SIGNIFICANT CHANGE UP (ref 0–1)
BASOPHILS NFR BLD AUTO: 0.1 % — SIGNIFICANT CHANGE UP (ref 0–1)
BILIRUB DIRECT SERPL-MCNC: 2.8 MG/DL — HIGH (ref 0–0.2)
BILIRUB DIRECT SERPL-MCNC: 3.1 MG/DL — HIGH (ref 0–0.2)
BILIRUB INDIRECT FLD-MCNC: 0.3 MG/DL — SIGNIFICANT CHANGE UP (ref 0.2–1.2)
BILIRUB INDIRECT FLD-MCNC: 0.4 MG/DL — SIGNIFICANT CHANGE UP (ref 0.2–1.2)
BILIRUB SERPL-MCNC: 3.1 MG/DL — HIGH (ref 0.2–1.2)
BILIRUB SERPL-MCNC: 3.5 MG/DL — HIGH (ref 0.2–1.2)
BUN SERPL-MCNC: 8 MG/DL — LOW (ref 10–20)
CALCIUM SERPL-MCNC: 7.7 MG/DL — LOW (ref 8.5–10.1)
CALCIUM SERPL-MCNC: 7.7 MG/DL — LOW (ref 8.5–10.1)
CALCIUM SERPL-MCNC: 8.1 MG/DL — LOW (ref 8.5–10.1)
CHLORIDE SERPL-SCNC: 102 MMOL/L — SIGNIFICANT CHANGE UP (ref 98–110)
CHLORIDE SERPL-SCNC: 102 MMOL/L — SIGNIFICANT CHANGE UP (ref 98–110)
CHLORIDE SERPL-SCNC: 104 MMOL/L — SIGNIFICANT CHANGE UP (ref 98–110)
CO2 SERPL-SCNC: 21 MMOL/L — SIGNIFICANT CHANGE UP (ref 17–32)
CO2 SERPL-SCNC: 22 MMOL/L — SIGNIFICANT CHANGE UP (ref 17–32)
CO2 SERPL-SCNC: 23 MMOL/L — SIGNIFICANT CHANGE UP (ref 17–32)
CREAT SERPL-MCNC: 0.5 MG/DL — LOW (ref 0.7–1.5)
CREAT SERPL-MCNC: 0.6 MG/DL — LOW (ref 0.7–1.5)
CREAT SERPL-MCNC: 0.6 MG/DL — LOW (ref 0.7–1.5)
EOSINOPHIL # BLD AUTO: 0 K/UL — SIGNIFICANT CHANGE UP (ref 0–0.7)
EOSINOPHIL # BLD AUTO: 0 K/UL — SIGNIFICANT CHANGE UP (ref 0–0.7)
EOSINOPHIL NFR BLD AUTO: 0 % — SIGNIFICANT CHANGE UP (ref 0–8)
EOSINOPHIL NFR BLD AUTO: 0 % — SIGNIFICANT CHANGE UP (ref 0–8)
GLUCOSE SERPL-MCNC: 119 MG/DL — HIGH (ref 70–99)
GLUCOSE SERPL-MCNC: 122 MG/DL — HIGH (ref 70–99)
GLUCOSE SERPL-MCNC: 91 MG/DL — SIGNIFICANT CHANGE UP (ref 70–99)
HCT VFR BLD CALC: 34.2 % — LOW (ref 37–47)
HCT VFR BLD CALC: 37.1 % — SIGNIFICANT CHANGE UP (ref 37–47)
HGB BLD-MCNC: 11.3 G/DL — LOW (ref 12–16)
HGB BLD-MCNC: 12 G/DL — SIGNIFICANT CHANGE UP (ref 12–16)
IMM GRANULOCYTES NFR BLD AUTO: 0.5 % — HIGH (ref 0.1–0.3)
IMM GRANULOCYTES NFR BLD AUTO: 0.6 % — HIGH (ref 0.1–0.3)
INR BLD: 1.62 RATIO — HIGH (ref 0.65–1.3)
LYMPHOCYTES # BLD AUTO: 0.24 K/UL — LOW (ref 1.2–3.4)
LYMPHOCYTES # BLD AUTO: 0.36 K/UL — LOW (ref 1.2–3.4)
LYMPHOCYTES # BLD AUTO: 1.3 % — LOW (ref 20.5–51.1)
LYMPHOCYTES # BLD AUTO: 4.2 % — LOW (ref 20.5–51.1)
MAGNESIUM SERPL-MCNC: 1.5 MG/DL — LOW (ref 1.8–2.4)
MAGNESIUM SERPL-MCNC: 2.1 MG/DL — SIGNIFICANT CHANGE UP (ref 1.8–2.4)
MCHC RBC-ENTMCNC: 27.2 PG — SIGNIFICANT CHANGE UP (ref 27–31)
MCHC RBC-ENTMCNC: 27.3 PG — SIGNIFICANT CHANGE UP (ref 27–31)
MCHC RBC-ENTMCNC: 32.3 G/DL — SIGNIFICANT CHANGE UP (ref 32–37)
MCHC RBC-ENTMCNC: 33 G/DL — SIGNIFICANT CHANGE UP (ref 32–37)
MCV RBC AUTO: 82.6 FL — SIGNIFICANT CHANGE UP (ref 81–99)
MCV RBC AUTO: 84.1 FL — SIGNIFICANT CHANGE UP (ref 81–99)
MONOCYTES # BLD AUTO: 0.22 K/UL — SIGNIFICANT CHANGE UP (ref 0.1–0.6)
MONOCYTES # BLD AUTO: 0.32 K/UL — SIGNIFICANT CHANGE UP (ref 0.1–0.6)
MONOCYTES NFR BLD AUTO: 1.8 % — SIGNIFICANT CHANGE UP (ref 1.7–9.3)
MONOCYTES NFR BLD AUTO: 2.5 % — SIGNIFICANT CHANGE UP (ref 1.7–9.3)
NEUTROPHILS # BLD AUTO: 17.23 K/UL — HIGH (ref 1.4–6.5)
NEUTROPHILS # BLD AUTO: 8.03 K/UL — HIGH (ref 1.4–6.5)
NEUTROPHILS NFR BLD AUTO: 92.7 % — HIGH (ref 42.2–75.2)
NEUTROPHILS NFR BLD AUTO: 96.2 % — HIGH (ref 42.2–75.2)
NRBC # BLD: 0 /100 WBCS — SIGNIFICANT CHANGE UP (ref 0–0)
NRBC # BLD: 0 /100 WBCS — SIGNIFICANT CHANGE UP (ref 0–0)
PHOSPHATE SERPL-MCNC: 1.9 MG/DL — LOW (ref 2.1–4.9)
PHOSPHATE SERPL-MCNC: 3 MG/DL — SIGNIFICANT CHANGE UP (ref 2.1–4.9)
PLATELET # BLD AUTO: 182 K/UL — SIGNIFICANT CHANGE UP (ref 130–400)
PLATELET # BLD AUTO: 187 K/UL — SIGNIFICANT CHANGE UP (ref 130–400)
POTASSIUM SERPL-MCNC: 3.3 MMOL/L — LOW (ref 3.5–5)
POTASSIUM SERPL-MCNC: 3.3 MMOL/L — LOW (ref 3.5–5)
POTASSIUM SERPL-MCNC: 3.7 MMOL/L — SIGNIFICANT CHANGE UP (ref 3.5–5)
POTASSIUM SERPL-SCNC: 3.3 MMOL/L — LOW (ref 3.5–5)
POTASSIUM SERPL-SCNC: 3.3 MMOL/L — LOW (ref 3.5–5)
POTASSIUM SERPL-SCNC: 3.7 MMOL/L — SIGNIFICANT CHANGE UP (ref 3.5–5)
PROT SERPL-MCNC: 5.7 G/DL — LOW (ref 6–8)
PROT SERPL-MCNC: 6 G/DL — SIGNIFICANT CHANGE UP (ref 6–8)
PROTHROM AB SERPL-ACNC: 17.4 SEC — HIGH (ref 9.95–12.87)
RBC # BLD: 4.14 M/UL — LOW (ref 4.2–5.4)
RBC # BLD: 4.41 M/UL — SIGNIFICANT CHANGE UP (ref 4.2–5.4)
RBC # FLD: 14.8 % — HIGH (ref 11.5–14.5)
RBC # FLD: 14.8 % — HIGH (ref 11.5–14.5)
SODIUM SERPL-SCNC: 139 MMOL/L — SIGNIFICANT CHANGE UP (ref 135–146)
SODIUM SERPL-SCNC: 140 MMOL/L — SIGNIFICANT CHANGE UP (ref 135–146)
SODIUM SERPL-SCNC: 140 MMOL/L — SIGNIFICANT CHANGE UP (ref 135–146)
WBC # BLD: 17.92 K/UL — HIGH (ref 4.8–10.8)
WBC # BLD: 8.66 K/UL — SIGNIFICANT CHANGE UP (ref 4.8–10.8)
WBC # FLD AUTO: 17.92 K/UL — HIGH (ref 4.8–10.8)
WBC # FLD AUTO: 8.66 K/UL — SIGNIFICANT CHANGE UP (ref 4.8–10.8)

## 2018-07-06 RX ORDER — HEPARIN SODIUM 5000 [USP'U]/ML
5000 INJECTION INTRAVENOUS; SUBCUTANEOUS EVERY 8 HOURS
Qty: 0 | Refills: 0 | Status: DISCONTINUED | OUTPATIENT
Start: 2018-07-06 | End: 2018-07-07

## 2018-07-06 RX ORDER — POTASSIUM CHLORIDE 20 MEQ
20 PACKET (EA) ORAL
Qty: 0 | Refills: 0 | Status: COMPLETED | OUTPATIENT
Start: 2018-07-06 | End: 2018-07-06

## 2018-07-06 RX ORDER — PIPERACILLIN AND TAZOBACTAM 4; .5 G/20ML; G/20ML
3.38 INJECTION, POWDER, LYOPHILIZED, FOR SOLUTION INTRAVENOUS EVERY 8 HOURS
Qty: 0 | Refills: 0 | Status: DISCONTINUED | OUTPATIENT
Start: 2018-07-06 | End: 2018-07-07

## 2018-07-06 RX ORDER — ACETAMINOPHEN 500 MG
650 TABLET ORAL EVERY 6 HOURS
Qty: 0 | Refills: 0 | Status: DISCONTINUED | OUTPATIENT
Start: 2018-07-06 | End: 2018-07-07

## 2018-07-06 RX ORDER — MAGNESIUM SULFATE 500 MG/ML
2 VIAL (ML) INJECTION ONCE
Qty: 0 | Refills: 0 | Status: COMPLETED | OUTPATIENT
Start: 2018-07-06 | End: 2018-07-06

## 2018-07-06 RX ORDER — PHYTONADIONE (VIT K1) 5 MG
5 TABLET ORAL ONCE
Qty: 0 | Refills: 0 | Status: COMPLETED | OUTPATIENT
Start: 2018-07-06 | End: 2018-07-06

## 2018-07-06 RX ADMIN — MORPHINE SULFATE 4 MILLIGRAM(S): 50 CAPSULE, EXTENDED RELEASE ORAL at 00:09

## 2018-07-06 RX ADMIN — MORPHINE SULFATE 2 MILLIGRAM(S): 50 CAPSULE, EXTENDED RELEASE ORAL at 16:04

## 2018-07-06 RX ADMIN — PANTOPRAZOLE SODIUM 40 MILLIGRAM(S): 20 TABLET, DELAYED RELEASE ORAL at 06:11

## 2018-07-06 RX ADMIN — Medication 101 MILLIGRAM(S): at 08:59

## 2018-07-06 RX ADMIN — MORPHINE SULFATE 4 MILLIGRAM(S): 50 CAPSULE, EXTENDED RELEASE ORAL at 15:11

## 2018-07-06 RX ADMIN — PIPERACILLIN AND TAZOBACTAM 25 GRAM(S): 4; .5 INJECTION, POWDER, LYOPHILIZED, FOR SOLUTION INTRAVENOUS at 01:16

## 2018-07-06 RX ADMIN — PIPERACILLIN AND TAZOBACTAM 25 GRAM(S): 4; .5 INJECTION, POWDER, LYOPHILIZED, FOR SOLUTION INTRAVENOUS at 21:08

## 2018-07-06 RX ADMIN — Medication 50 MILLIEQUIVALENT(S): at 08:30

## 2018-07-06 RX ADMIN — MORPHINE SULFATE 4 MILLIGRAM(S): 50 CAPSULE, EXTENDED RELEASE ORAL at 13:13

## 2018-07-06 RX ADMIN — Medication 650 MILLIGRAM(S): at 23:58

## 2018-07-06 RX ADMIN — ONDANSETRON 4 MILLIGRAM(S): 8 TABLET, FILM COATED ORAL at 07:13

## 2018-07-06 RX ADMIN — PIPERACILLIN AND TAZOBACTAM 25 GRAM(S): 4; .5 INJECTION, POWDER, LYOPHILIZED, FOR SOLUTION INTRAVENOUS at 13:01

## 2018-07-06 RX ADMIN — HEPARIN SODIUM 5000 UNIT(S): 5000 INJECTION INTRAVENOUS; SUBCUTANEOUS at 21:09

## 2018-07-06 RX ADMIN — Medication 50 GRAM(S): at 08:31

## 2018-07-06 RX ADMIN — MORPHINE SULFATE 2 MILLIGRAM(S): 50 CAPSULE, EXTENDED RELEASE ORAL at 17:51

## 2018-07-06 RX ADMIN — MORPHINE SULFATE 4 MILLIGRAM(S): 50 CAPSULE, EXTENDED RELEASE ORAL at 19:14

## 2018-07-06 RX ADMIN — Medication 50 MILLIEQUIVALENT(S): at 11:36

## 2018-07-06 RX ADMIN — ONDANSETRON 4 MILLIGRAM(S): 8 TABLET, FILM COATED ORAL at 19:14

## 2018-07-06 NOTE — CHART NOTE - NSCHARTNOTEFT_GEN_A_CORE
PACU ANESTHESIA ADMISSION NOTE      Procedure:   Post op diagnosis:      ____  Intubated  TV:______       Rate: ______      FiO2: ______    __x__  Patent Airway    _x___  Full return of protective reflexes    __x__  Full recovery from anesthesia / back to baseline     Vitals:   T:           R:16                  BP: 113/73                 Sat:  100                 P: 85      Mental Status:  ___x_ Awake   x_____ Alert   _____ Drowsy   _____ Sedated    Nausea/Vomiting:  _x___ NO  ______Yes,   See Post - Op Orders          Pain Scale (0-10):  0_____    Treatment: ____ None    ____ See Post - Op/PCA Orders    Post - Operative Fluids:   ____ Oral   ____ See Post - Op Orders    Plan: Discharge:   ____Home     x  _____Floor     _____Critical Care    _____  Other:_________________    Comments:

## 2018-07-06 NOTE — PROVIDER CONTACT NOTE (CHANGE IN STATUS NOTIFICATION) - SITUATION
Pt ok to go down for ERSP, potassium and magnesium  running at this time, VK not available yet Pt ok to go down for ERSP, potassium and magnesium  running at this time.

## 2018-07-06 NOTE — PROGRESS NOTE ADULT - SUBJECTIVE AND OBJECTIVE BOX
Progress Note: General Surgery  Patient: BISHNU LEVY , 23y (1995)Female   MRN: [id number]  Location: [place] [id number] 025 B  Visit: 18 Inpatient  Date: 18 @ 05:17  Hospital Day: 1    Procedure/Diagnosis: Cholangitis  Events over 24h: Pt was seen and evaluated this morning. Pt had moderate pain but otherwise doing well. Has not had any recent bowel movements or passing gas. Complains of having dark urine, and feels thirsty. Importance of NPO diet was discussed with patient. Otherwise patient is ambulating and does not appear to be in acute distress. ERCP     Vitals: T(F): 99.7 (18 @ 00:00), Max: 102.9 (18 @ 19:42)  HR: 104 (18 @ 00:00)  BP: 114/65 (18 @ 00:00) (111/53 - 139/79)  RR: 18 (18 @ 00:00)  SpO2: 98% (18 @ 15:35)    In:   Out:   Net:   Diet: Diet, NPO (18 @ 23:07)    IV Fluids: yes no , Type: sodium chloride 0.9%. 1000 milliLiter(s) (100 mL/Hr) IV Continuous <Continuous>    Physical Examination:  General Appearance: NAD, alert and cooperative  HEENT: NCAT, WNL  Heart: S1 and S2. No murmurs. Rhythm is regular irregular.   Lungs: Clear to auscultation BL without rales, rhonchi, wheezing, crackles or diminished breath sounds.  Abdomen:  Positive bowel sounds. Soft, nondistended, diana-incisional tenderness nontender. Obese. No rigidity, guarding, or rebound tenderness. No masses palpated. No McBurney point tenderness. No Sahni's sign. No Rovsing's sign.   MSK/Extremities: ROM intact BL upper/lower extremities. No joint erythema or tenderness. Normal gait. No significant deformity or joint abnormality. No edema. Peripheral pulses intact. No varicosities. WNL  Neuro: CN II-XII intact. Strength and sensation symmetric and intact throughout. Reflexes 2+ throughout. Cerebellar testing normal. Sensation grossly intact.  Skin: Warm/dry, Normal color, No jaundice.   Incisions/Wounds: Dressings in place, clean, dry and intact, no signs of infection/active bleeding/drainage    Medications: [Standing]  pantoprazole   Suspension 40 milliGRAM(s) Oral before breakfast  piperacillin/tazobactam IVPB. 3.375 Gram(s) IV Intermittent every 8 hours  sodium chloride 0.9%. 1000 milliLiter(s) (100 mL/Hr) IV Continuous <Continuous>    DVT Prophylaxis:   GI Prophylaxis: pantoprazole   Suspension 40 milliGRAM(s) Oral before breakfast    Antibiotics: piperacillin/tazobactam IVPB. 3.375 Gram(s) IV Intermittent every 8 hours    Anticoagulation:   Medications:[PRN]  acetaminophen   Tablet. 650 milliGRAM(s) Oral every 6 hours PRN  morphine  - Injectable 2 milliGRAM(s) IV Push every 4 hours PRN  morphine  - Injectable 4 milliGRAM(s) IV Push every 4 hours PRN  ondansetron Injectable 4 milliGRAM(s) IV Push every 6 hours PRN    Labs:                        11.3   17.92 )-----------( 187      ( 2018 01:18 )             34.2     07-06    140  |  104  |  8<L>  ----------------------------<  122<H>  3.3<L>   |  21  |  0.5<L>    Ca    7.7<L>      2018 01:18  Phos  3.0     07-06  Mg     1.5     07-    TPro  5.7<L>  /  Alb  3.6  /  TBili  3.5<H>  /  DBili  3.1<H>  /  AST  245<H>  /  ALT  365<H>  /  AlkPhos  108  07-06    LIVER FUNCTIONS - ( 2018 01:18 )  Alb: 3.6 g/dL / Pro: 5.7 g/dL / ALK PHOS: 108 U/L / ALT: 365 U/L / AST: 245 U/L / GGT: x           PT/INR - ( 2018 01:18 )   PT: 17.40 sec;   INR: 1.62 ratio         PTT - ( 2018 01:18 )  PTT:31.0 sec        Urine/Micro:    Urinalysis Basic - ( 2018 10:25 )    Color: Yellow / Appearance: Cloudy / S.015 / pH: x  Gluc: x / Ketone: Negative  / Bili: Negative / Urobili: 1.0 mg/dL   Blood: x / Protein: Negative mg/dL / Nitrite: Negative   Leuk Esterase: Negative / RBC: 1-2 /HPF / WBC 1-2 /HPF   Sq Epi: x / Non Sq Epi: Occasional /HPF / Bacteria: x      Imaging:  None/24h    Assessment:  23y Female patient admitted S/P *** , with the above physical exam, labs, and imaging findings.    Plan:      Date/Time: 18 @ 05:17 Progress Note: General Surgery  Patient: BISHNU LEVY , 23y (1995)Female   MRN: [id number]  Location: [place] [id number] 025 B  Visit: 18 Inpatient  Date: 18 @ 05:17  Hospital Day: 1    Procedure/Diagnosis: Cholangitis  Events over 24h: Pt was seen and evaluated this morning. Pt had moderate pain but otherwise doing well. Has not had any recent bowel movements or passing gas. Complains of having dark urine, and feels thirsty. Importance of NPO diet was discussed with patient. Otherwise patient is ambulating and does not appear to be in acute distress. ERCP     Vitals: T(F): 99.7 (18 @ 00:00), Max: 102.9 (18 @ 19:42)  HR: 104 (18 @ 00:00)  BP: 114/65 (18 @ 00:00) (111/53 - 139/79)  RR: 18 (18 @ 00:00)  SpO2: 98% (18 @ 15:35)    In:   Out:   Net:   Diet: Diet, NPO (18 @ 23:07)    IV Fluids: yes no , Type: sodium chloride 0.9%. 1000 milliLiter(s) (100 mL/Hr) IV Continuous <Continuous>    Physical Examination:  General Appearance: NAD, alert and cooperative  HEENT: NCAT, WNL  Heart: S1 and S2. No murmurs. Rhythm is regular irregular.   Lungs: Clear to auscultation BL without rales, rhonchi, wheezing, crackles or diminished breath sounds.  Abdomen:  Soft, tenderness, nondistended, No rigidity, guarding, or rebound tenderness. No masses palpated.  Skin: Warm/dry, Normal color, No jaundice.     Medications: [Standing]  pantoprazole   Suspension 40 milliGRAM(s) Oral before breakfast  piperacillin/tazobactam IVPB. 3.375 Gram(s) IV Intermittent every 8 hours  sodium chloride 0.9%. 1000 milliLiter(s) (100 mL/Hr) IV Continuous <Continuous>    DVT Prophylaxis:   GI Prophylaxis: pantoprazole   Suspension 40 milliGRAM(s) Oral before breakfast    Antibiotics: piperacillin/tazobactam IVPB. 3.375 Gram(s) IV Intermittent every 8 hours    Anticoagulation:   Medications:[PRN]  acetaminophen   Tablet. 650 milliGRAM(s) Oral every 6 hours PRN  morphine  - Injectable 2 milliGRAM(s) IV Push every 4 hours PRN  morphine  - Injectable 4 milliGRAM(s) IV Push every 4 hours PRN  ondansetron Injectable 4 milliGRAM(s) IV Push every 6 hours PRN    Labs:                        11.3   17.92 )-----------( 187      ( 2018 01:18 )             34.2     07-06    140  |  104  |  8<L>  ----------------------------<  122<H>  3.3<L>   |  21  |  0.5<L>    Ca    7.7<L>      2018 01:18  Phos  3.0     07-  Mg     1.5     07-    TPro  5.7<L>  /  Alb  3.6  /  TBili  3.5<H>  /  DBili  3.1<H>  /  AST  245<H>  /  ALT  365<H>  /  AlkPhos  108  07-06    LIVER FUNCTIONS - ( 2018 01:18 )  Alb: 3.6 g/dL / Pro: 5.7 g/dL / ALK PHOS: 108 U/L / ALT: 365 U/L / AST: 245 U/L / GGT: x           PT/INR - ( 2018 01:18 )   PT: 17.40 sec;   INR: 1.62 ratio         PTT - ( 2018 01:18 )  PTT:31.0 sec        Urine/Micro:    Urinalysis Basic - ( 2018 10:25 )    Color: Yellow / Appearance: Cloudy / S.015 / pH: x  Gluc: x / Ketone: Negative  / Bili: Negative / Urobili: 1.0 mg/dL   Blood: x / Protein: Negative mg/dL / Nitrite: Negative   Leuk Esterase: Negative / RBC: 1-2 /HPF / WBC 1-2 /HPF   Sq Epi: x / Non Sq Epi: Occasional /HPF / Bacteria: x        Assessment:  23y Female patient admitted S/P  Cholangitis, with the above physical exam, labs, and imaging findings.    Plan:  GI to do ERCP in the AM  Follow up with LFTs  NPO  IVF  Zosyn    Date/Time: 18 @ 05:17 Progress Note: General Surgery  Patient: BISHNU LEVY , 23y (1995)Female   MRN: [id number]  Location: [place] [id number] 025 B  Visit: 18 Inpatient  Date: 18 @ 05:17  Hospital Day: 1    Procedure/Diagnosis: Cholangitis  Events over 24h: Pt was seen and evaluated this morning. Pt had moderate pain but otherwise doing well. Has not had any recent bowel movements or passing gas. Complains of having dark urine, and feels thirsty. Importance of NPO diet was discussed with patient. Otherwise patient is ambulating and does not appear to be in acute distress. ERCP     Vitals: T(F): 99.7 (18 @ 00:00), Max: 102.9 (18 @ 19:42)  HR: 104 (18 @ 00:00)  BP: 114/65 (18 @ 00:00) (111/53 - 139/79)  RR: 18 (18 @ 00:00)  SpO2: 98% (18 @ 15:35)    Diet: Diet, NPO (18 @ 23:07)    IV Fluids: yes no , Type: sodium chloride 0.9%. 1000 milliLiter(s) (100 mL/Hr) IV Continuous <Continuous>    Physical Examination:  General Appearance: NAD, alert and cooperative  HEENT: NCAT, WNL  Heart: S1 and S2. No murmurs. Rhythm is regular irregular.   Lungs: Clear to auscultation BL without rales, rhonchi, wheezing, crackles or diminished breath sounds.  Abdomen:  Soft, tenderness RUQ, nondistended, No rigidity, guarding, or rebound tenderness. No masses palpated.  Skin: Warm/dry, Normal color, No jaundice.     Medications: [Standing]  pantoprazole   Suspension 40 milliGRAM(s) Oral before breakfast  piperacillin/tazobactam IVPB. 3.375 Gram(s) IV Intermittent every 8 hours  sodium chloride 0.9%. 1000 milliLiter(s) (100 mL/Hr) IV Continuous <Continuous>    DVT Prophylaxis:   GI Prophylaxis: pantoprazole   Suspension 40 milliGRAM(s) Oral before breakfast    Antibiotics: piperacillin/tazobactam IVPB. 3.375 Gram(s) IV Intermittent every 8 hours    Anticoagulation:   Medications:[PRN]  acetaminophen   Tablet. 650 milliGRAM(s) Oral every 6 hours PRN  morphine  - Injectable 2 milliGRAM(s) IV Push every 4 hours PRN  morphine  - Injectable 4 milliGRAM(s) IV Push every 4 hours PRN  ondansetron Injectable 4 milliGRAM(s) IV Push every 6 hours PRN    Labs:                        11.3   17.92 )-----------( 187      ( 2018 01:18 )             34.2     -    140  |  104  |  8<L>  ----------------------------<  122<H>  3.3<L>   |  21  |  0.5<L>    Ca    7.7<L>      2018 01:18  Phos  3.0     -  Mg     1.5         TPro  5.7<L>  /  Alb  3.6  /  TBili  3.5<H>  /  DBili  3.1<H>  /  AST  245<H>  /  ALT  365<H>  /  AlkPhos  108      LIVER FUNCTIONS - ( 2018 01:18 )  Alb: 3.6 g/dL / Pro: 5.7 g/dL / ALK PHOS: 108 U/L / ALT: 365 U/L / AST: 245 U/L / GGT: x           PT/INR - ( 2018 01:18 )   PT: 17.40 sec;   INR: 1.62 ratio         PTT - ( 2018 01:18 )  PTT:31.0 sec        Urine/Micro:    Urinalysis Basic - ( 2018 10:25 )    Color: Yellow / Appearance: Cloudy / S.015 / pH: x  Gluc: x / Ketone: Negative  / Bili: Negative / Urobili: 1.0 mg/dL   Blood: x / Protein: Negative mg/dL / Nitrite: Negative   Leuk Esterase: Negative / RBC: 1-2 /HPF / WBC 1-2 /HPF   Sq Epi: x / Non Sq Epi: Occasional /HPF / Bacteria: x        Assessment:  23y Female patient admitted S/P  Cholangitis, with the above physical exam, labs, and imaging findings.    Plan:  GI to do ERCP in the AM  Follow up with LFTs  NPO  IVF  Zosyn    Date/Time: 18 @ 05:17

## 2018-07-07 ENCOUNTER — RESULT REVIEW (OUTPATIENT)
Age: 23
End: 2018-07-07

## 2018-07-07 LAB
ABO RH CONFIRMATION: SIGNIFICANT CHANGE UP
ALBUMIN SERPL ELPH-MCNC: 3.4 G/DL — LOW (ref 3.5–5.2)
ALP SERPL-CCNC: 105 U/L — SIGNIFICANT CHANGE UP (ref 30–115)
ALT FLD-CCNC: 232 U/L — HIGH (ref 0–41)
ANION GAP SERPL CALC-SCNC: 15 MMOL/L — HIGH (ref 7–14)
AST SERPL-CCNC: 89 U/L — HIGH (ref 0–41)
BASOPHILS # BLD AUTO: 0 K/UL — SIGNIFICANT CHANGE UP (ref 0–0.2)
BASOPHILS NFR BLD AUTO: 0 % — SIGNIFICANT CHANGE UP (ref 0–1)
BILIRUB DIRECT SERPL-MCNC: 2.5 MG/DL — HIGH (ref 0–0.2)
BILIRUB INDIRECT FLD-MCNC: 0.2 MG/DL — SIGNIFICANT CHANGE UP (ref 0.2–1.2)
BILIRUB SERPL-MCNC: 2.7 MG/DL — HIGH (ref 0.2–1.2)
BLD GP AB SCN SERPL QL: SIGNIFICANT CHANGE UP
BUN SERPL-MCNC: 9 MG/DL — LOW (ref 10–20)
CALCIUM SERPL-MCNC: 7.9 MG/DL — LOW (ref 8.5–10.1)
CHLORIDE SERPL-SCNC: 107 MMOL/L — SIGNIFICANT CHANGE UP (ref 98–110)
CO2 SERPL-SCNC: 20 MMOL/L — SIGNIFICANT CHANGE UP (ref 17–32)
CREAT SERPL-MCNC: 0.6 MG/DL — LOW (ref 0.7–1.5)
EOSINOPHIL # BLD AUTO: 0 K/UL — SIGNIFICANT CHANGE UP (ref 0–0.7)
EOSINOPHIL NFR BLD AUTO: 0 % — SIGNIFICANT CHANGE UP (ref 0–8)
GLUCOSE SERPL-MCNC: 91 MG/DL — SIGNIFICANT CHANGE UP (ref 70–99)
HCG UR QL: NEGATIVE — SIGNIFICANT CHANGE UP
HCT VFR BLD CALC: 32.6 % — LOW (ref 37–47)
HGB BLD-MCNC: 10.3 G/DL — LOW (ref 12–16)
IMM GRANULOCYTES NFR BLD AUTO: 0.8 % — HIGH (ref 0.1–0.3)
LYMPHOCYTES # BLD AUTO: 0.55 K/UL — LOW (ref 1.2–3.4)
LYMPHOCYTES # BLD AUTO: 8.3 % — LOW (ref 20.5–51.1)
MAGNESIUM SERPL-MCNC: 2 MG/DL — SIGNIFICANT CHANGE UP (ref 1.8–2.4)
MCHC RBC-ENTMCNC: 26.5 PG — LOW (ref 27–31)
MCHC RBC-ENTMCNC: 31.6 G/DL — LOW (ref 32–37)
MCV RBC AUTO: 83.8 FL — SIGNIFICANT CHANGE UP (ref 81–99)
MONOCYTES # BLD AUTO: 0.23 K/UL — SIGNIFICANT CHANGE UP (ref 0.1–0.6)
MONOCYTES NFR BLD AUTO: 3.5 % — SIGNIFICANT CHANGE UP (ref 1.7–9.3)
NEUTROPHILS # BLD AUTO: 5.82 K/UL — SIGNIFICANT CHANGE UP (ref 1.4–6.5)
NEUTROPHILS NFR BLD AUTO: 87.4 % — HIGH (ref 42.2–75.2)
NRBC # BLD: 0 /100 WBCS — SIGNIFICANT CHANGE UP (ref 0–0)
PHOSPHATE SERPL-MCNC: 2.4 MG/DL — SIGNIFICANT CHANGE UP (ref 2.1–4.9)
PLATELET # BLD AUTO: 178 K/UL — SIGNIFICANT CHANGE UP (ref 130–400)
POTASSIUM SERPL-MCNC: 3.7 MMOL/L — SIGNIFICANT CHANGE UP (ref 3.5–5)
POTASSIUM SERPL-SCNC: 3.7 MMOL/L — SIGNIFICANT CHANGE UP (ref 3.5–5)
PROT SERPL-MCNC: 5.6 G/DL — LOW (ref 6–8)
RBC # BLD: 3.89 M/UL — LOW (ref 4.2–5.4)
RBC # FLD: 15 % — HIGH (ref 11.5–14.5)
SODIUM SERPL-SCNC: 142 MMOL/L — SIGNIFICANT CHANGE UP (ref 135–146)
TYPE + AB SCN PNL BLD: SIGNIFICANT CHANGE UP
WBC # BLD: 6.65 K/UL — SIGNIFICANT CHANGE UP (ref 4.8–10.8)
WBC # FLD AUTO: 6.65 K/UL — SIGNIFICANT CHANGE UP (ref 4.8–10.8)

## 2018-07-07 PROCEDURE — 99232 SBSQ HOSP IP/OBS MODERATE 35: CPT | Mod: 57

## 2018-07-07 PROCEDURE — 47562 LAPAROSCOPIC CHOLECYSTECTOMY: CPT

## 2018-07-07 RX ORDER — ACETAMINOPHEN 500 MG
650 TABLET ORAL EVERY 6 HOURS
Qty: 0 | Refills: 0 | Status: DISCONTINUED | OUTPATIENT
Start: 2018-07-07 | End: 2018-07-08

## 2018-07-07 RX ORDER — HYDROMORPHONE HYDROCHLORIDE 2 MG/ML
0.5 INJECTION INTRAMUSCULAR; INTRAVENOUS; SUBCUTANEOUS
Qty: 0 | Refills: 0 | Status: DISCONTINUED | OUTPATIENT
Start: 2018-07-07 | End: 2018-07-07

## 2018-07-07 RX ORDER — OXYCODONE AND ACETAMINOPHEN 5; 325 MG/1; MG/1
1 TABLET ORAL EVERY 4 HOURS
Qty: 0 | Refills: 0 | Status: DISCONTINUED | OUTPATIENT
Start: 2018-07-07 | End: 2018-07-08

## 2018-07-07 RX ORDER — OXYCODONE AND ACETAMINOPHEN 5; 325 MG/1; MG/1
2 TABLET ORAL ONCE
Qty: 0 | Refills: 0 | Status: DISCONTINUED | OUTPATIENT
Start: 2018-07-07 | End: 2018-07-07

## 2018-07-07 RX ORDER — ONDANSETRON 8 MG/1
4 TABLET, FILM COATED ORAL ONCE
Qty: 0 | Refills: 0 | Status: DISCONTINUED | OUTPATIENT
Start: 2018-07-07 | End: 2018-07-07

## 2018-07-07 RX ORDER — KETOROLAC TROMETHAMINE 30 MG/ML
30 SYRINGE (ML) INJECTION ONCE
Qty: 0 | Refills: 0 | Status: DISCONTINUED | OUTPATIENT
Start: 2018-07-07 | End: 2018-07-07

## 2018-07-07 RX ORDER — SODIUM CHLORIDE 9 MG/ML
1000 INJECTION, SOLUTION INTRAVENOUS
Qty: 0 | Refills: 0 | Status: DISCONTINUED | OUTPATIENT
Start: 2018-07-07 | End: 2018-07-07

## 2018-07-07 RX ORDER — IBUPROFEN 200 MG
600 TABLET ORAL EVERY 6 HOURS
Qty: 0 | Refills: 0 | Status: DISCONTINUED | OUTPATIENT
Start: 2018-07-07 | End: 2018-07-08

## 2018-07-07 RX ADMIN — MORPHINE SULFATE 2 MILLIGRAM(S): 50 CAPSULE, EXTENDED RELEASE ORAL at 06:05

## 2018-07-07 RX ADMIN — HEPARIN SODIUM 5000 UNIT(S): 5000 INJECTION INTRAVENOUS; SUBCUTANEOUS at 14:20

## 2018-07-07 RX ADMIN — HEPARIN SODIUM 5000 UNIT(S): 5000 INJECTION INTRAVENOUS; SUBCUTANEOUS at 05:41

## 2018-07-07 RX ADMIN — MORPHINE SULFATE 4 MILLIGRAM(S): 50 CAPSULE, EXTENDED RELEASE ORAL at 15:25

## 2018-07-07 RX ADMIN — PANTOPRAZOLE SODIUM 40 MILLIGRAM(S): 20 TABLET, DELAYED RELEASE ORAL at 08:50

## 2018-07-07 RX ADMIN — HYDROMORPHONE HYDROCHLORIDE 0.5 MILLIGRAM(S): 2 INJECTION INTRAMUSCULAR; INTRAVENOUS; SUBCUTANEOUS at 21:31

## 2018-07-07 RX ADMIN — MORPHINE SULFATE 2 MILLIGRAM(S): 50 CAPSULE, EXTENDED RELEASE ORAL at 05:50

## 2018-07-07 RX ADMIN — HYDROMORPHONE HYDROCHLORIDE 0.5 MILLIGRAM(S): 2 INJECTION INTRAMUSCULAR; INTRAVENOUS; SUBCUTANEOUS at 21:53

## 2018-07-07 RX ADMIN — PIPERACILLIN AND TAZOBACTAM 25 GRAM(S): 4; .5 INJECTION, POWDER, LYOPHILIZED, FOR SOLUTION INTRAVENOUS at 05:40

## 2018-07-07 RX ADMIN — SODIUM CHLORIDE 100 MILLILITER(S): 9 INJECTION, SOLUTION INTRAVENOUS at 21:21

## 2018-07-07 RX ADMIN — HYDROMORPHONE HYDROCHLORIDE 0.5 MILLIGRAM(S): 2 INJECTION INTRAMUSCULAR; INTRAVENOUS; SUBCUTANEOUS at 21:41

## 2018-07-07 RX ADMIN — PIPERACILLIN AND TAZOBACTAM 25 GRAM(S): 4; .5 INJECTION, POWDER, LYOPHILIZED, FOR SOLUTION INTRAVENOUS at 14:20

## 2018-07-07 RX ADMIN — MORPHINE SULFATE 4 MILLIGRAM(S): 50 CAPSULE, EXTENDED RELEASE ORAL at 16:25

## 2018-07-07 NOTE — PRE-ANESTHESIA EVALUATION ADULT - MALLAMPATI CLASS
Class II - visualization of the soft palate, fauces, and uvula
Class I (easy) - visualization of the soft palate, fauces, uvula, and both anterior and posterior pillars

## 2018-07-07 NOTE — PROGRESS NOTE ADULT - ASSESSMENT
3 year old female with insignificant PMH presenting with RUQ abdominal associated with fever, jaundice, imaging showing cholelithiasis with CBD dilation, picture suggestive of cholangitis.  - Cholangitis sp ERCP yesterday which showed pus from cbd and sludge so a cbd stent was placed  Patient feels better today but still complaining of pain   Liver enzymes trending down  continue abx and IV fluid  Hb dropped to 10.3 but no evidence of active bleed  follow up HB  encourage ambulation   will discuss with attending

## 2018-07-07 NOTE — PROGRESS NOTE ADULT - SUBJECTIVE AND OBJECTIVE BOX
GENERAL SURGERY PROGRESS NOTE    Patient: BISHNU LEVY , 23y ([birth year])Female   MRN: [id number]  Location: [place] [id number] [address] B  Visit: 18 Inpatient  Date: 18 @ 02:49    Hospital Day #: 3    Procedure/Dx/Injuries: acute cholecystitis, r/o choledocholithiasis    Events of past 24 hours: Pt had ERCP, which showed cholangitis and purulent drainage from CBD, and removal of sludge. Pt was ambulating during visit. Pt spiked fever of 101.9 during the night and was given Tylenol    PAST MEDICAL & SURGICAL HISTORY:  Other iron deficiency anemia  No significant past surgical history      Vitals: T(F): 97.8 (18 @ 01:34), Max: 101.6 (18 @ 00:00)  HR: 74 (18 @ 00:00)  BP: 120/62 (18 @ 00:00)  RR: 18 (18 @ 00:00)  SpO2: 100% (18 @ 10:55)      Pain (0-10):            Pain Control Adequate: [] YES [] N    Diet, NPO      Fluids:     I & O's:    Bowel Movement: : [] YES [] NO  Flatus: : [] YES [] NO    PHYSICAL EXAM  GEN:  CV:  LUNGS:  ABD:  EXT:  WOUND:  INCISION:    MEDICATIONS  (STANDING):  heparin  Injectable 5000 Unit(s) SubCutaneous every 8 hours  pantoprazole   Suspension 40 milliGRAM(s) Oral before breakfast  piperacillin/tazobactam IVPB. 3.375 Gram(s) IV Intermittent every 8 hours  sodium chloride 0.9%. 1000 milliLiter(s) (100 mL/Hr) IV Continuous <Continuous>    MEDICATIONS  (PRN):  acetaminophen   Tablet 650 milliGRAM(s) Oral every 6 hours PRN For Temp greater than 38 C (100.4 F)  acetaminophen   Tablet. 650 milliGRAM(s) Oral every 6 hours PRN Mild Pain (1 - 3)  morphine  - Injectable 2 milliGRAM(s) IV Push every 4 hours PRN Moderate Pain (4 - 6)  morphine  - Injectable 4 milliGRAM(s) IV Push every 4 hours PRN Severe Pain (7 - 10)  ondansetron Injectable 4 milliGRAM(s) IV Push every 6 hours PRN Nausea      DVT PROPHYLAXIS: [] YES [] NO   GI PROPHYLAXIS: [] YES [] NO   ANTICOAGULATION: [] YES [] NO   ANTIBIOTICS: [] YES [] NO piperacillin/tazobactam IVPB. 3.375 Gram(s)        LAB/STUDIES:  CAPILLARY BLOOD GLUCOSE                              10.3   6.65  )-----------( 178      ( 2018 01:14 )             32.6     07-    142  |  107  |  9<L>  ----------------------------<  91  3.7   |  20  |  0.6<L>    Ca    7.9<L>      2018 01:14  Phos  2.4       Mg     2.0         TPro  5.6<L>  /  Alb  3.4<L>  /  TBili  2.7<H>  /  DBili  2.5<H>  /  AST  89<H>  /  ALT  232<H>  /  AlkPhos  105                 5.6  | 2.7  | 89       ------------------[105     ( 2018 01:14 )  3.4  | 2.5  | 232         Lipase:x      Amylase:x        PT/INR - ( 2018 01:18 )   PT: 17.40 sec;   INR: 1.62 ratio         PTT - ( 2018 01:18 )  PTT:31.0 sec  Urinalysis Basic - ( 2018 10:25 )    Color: Yellow / Appearance: Cloudy / S.015 / pH: x  Gluc: x / Ketone: Negative  / Bili: Negative / Urobili: 1.0 mg/dL   Blood: x / Protein: Negative mg/dL / Nitrite: Negative   Leuk Esterase: Negative / RBC: 1-2 /HPF / WBC 1-2 /HPF   Sq Epi: x / Non Sq Epi: Occasional /HPF / Bacteria: x              IMAGING: GENERAL SURGERY PROGRESS NOTE    Patient: BISHNU LEVY , 23y ([birth year])Female   MRN: [id number]  Location: [place] [id number] [address]  Visit: 18 Inpatient  Date: 18 @ 02:49    Hospital Day #: 3    Procedure/Dx/Injuries: acute cholecystitis, r/o choledocholithiasis    Events of past 24 hours: Pt had ERCP, which showed cholangitis and purulent drainage from CBD, and removal of sludge. Pt was ambulating during visit. Pt spiked fever of 101.9 during the night and was given Tylenol. She was feeling better during the night and appeared to be resting comfortably. Vitals and temp will be followed up. No other acute events overnight. Pt denied pain and other complaints.    PAST MEDICAL & SURGICAL HISTORY:  Other iron deficiency anemia  No significant past surgical history      Vitals: T(F): 97.8 (18 @ 01:34), Max: 101.6 (18 @ 00:00)  HR: 74 (18 @ 00:00)  BP: 120/62 (18 @ 00:00)  RR: 18 (18 @ 00:00)  SpO2: 100% (18 @ 10:55)      Pain (0-10):            Pain Control Adequate: [] YES [] N    Diet, NPO      Fluids:     I & O's:    Bowel Movement: : [] YES [] NO  Flatus: : [] YES [] NO    PHYSICAL EXAM  GEN:  CV:  LUNGS:  ABD:  EXT:  WOUND:  INCISION:    MEDICATIONS  (STANDING):  heparin  Injectable 5000 Unit(s) SubCutaneous every 8 hours  pantoprazole   Suspension 40 milliGRAM(s) Oral before breakfast  piperacillin/tazobactam IVPB. 3.375 Gram(s) IV Intermittent every 8 hours  sodium chloride 0.9%. 1000 milliLiter(s) (100 mL/Hr) IV Continuous <Continuous>    MEDICATIONS  (PRN):  acetaminophen   Tablet 650 milliGRAM(s) Oral every 6 hours PRN For Temp greater than 38 C (100.4 F)  acetaminophen   Tablet. 650 milliGRAM(s) Oral every 6 hours PRN Mild Pain (1 - 3)  morphine  - Injectable 2 milliGRAM(s) IV Push every 4 hours PRN Moderate Pain (4 - 6)  morphine  - Injectable 4 milliGRAM(s) IV Push every 4 hours PRN Severe Pain (7 - 10)  ondansetron Injectable 4 milliGRAM(s) IV Push every 6 hours PRN Nausea      DVT PROPHYLAXIS: [] YES [] NO   GI PROPHYLAXIS: [] YES [] NO   ANTICOAGULATION: [] YES [] NO   ANTIBIOTICS: [] YES [] NO piperacillin/tazobactam IVPB. 3.375 Gram(s)        LAB/STUDIES:  CAPILLARY BLOOD GLUCOSE                              10.3   6.65  )-----------( 178      ( 2018 01:14 )             32.6     07-07    142  |  107  |  9<L>  ----------------------------<  91  3.7   |  20  |  0.6<L>    Ca    7.9<L>      2018 01:14  Phos  2.4     -  Mg     2.0     -    TPro  5.6<L>  /  Alb  3.4<L>  /  TBili  2.7<H>  /  DBili  2.5<H>  /  AST  89<H>  /  ALT  232<H>  /  AlkPhos  105  -07               5.6  | 2.7  | 89       ------------------[105     ( 2018 01:14 )  3.4  | 2.5  | 232         Lipase:x      Amylase:x        PT/INR - ( 2018 01:18 )   PT: 17.40 sec;   INR: 1.62 ratio         PTT - ( 2018 01:18 )  PTT:31.0 sec  Urinalysis Basic - ( 2018 10:25 )    Color: Yellow / Appearance: Cloudy / S.015 / pH: x  Gluc: x / Ketone: Negative  / Bili: Negative / Urobili: 1.0 mg/dL   Blood: x / Protein: Negative mg/dL / Nitrite: Negative   Leuk Esterase: Negative / RBC: 1-2 /HPF / WBC 1-2 /HPF   Sq Epi: x / Non Sq Epi: Occasional /HPF / Bacteria: x              IMAGING:

## 2018-07-07 NOTE — PROGRESS NOTE ADULT - SUBJECTIVE AND OBJECTIVE BOX
GI HPI Today:  patient sp ERCP with CBD stent placement feels better but still complaining of epigastric and RUQ pain radiating to the back  Denies any hematochezia melena, vomiting diarrhea      Hospital course:  Patient is 22yo F with PMHx gallstones x1year biliary colic, Fe++ def anemia, p/w sharp RUQ/epigastric pain since last night. Pt states she was worked up for gallstones last year and followed up by surgeon Dr. Jason,but did not had surgery. pain exacerbated by fatty and spicy foods in the past, ate spicy food last night with worsening RUQ pain. Radiates to mid back and right shoulder. Assoc with nausea, and total of 6 non-biliary nonbloody emesis since 6 am today. Patient also had 2 episodes of diarrhea, non bloody. Endorses subjective fever, has low grade temp (99.9) in ED. Denies headache, lightheadedness, CP, SOB, dysuria, LMP 1 week ago. No vaginal bleeding or discharge. (05 Jul 2018 15:58)      PAST MEDICAL & SURGICAL HISTORY  Other iron deficiency anemia  No significant past surgical history      ALLERGIES:  No Known Allergies      MEDICATIONS:  MEDICATIONS  (STANDING):  heparin  Injectable 5000 Unit(s) SubCutaneous every 8 hours  pantoprazole   Suspension 40 milliGRAM(s) Oral before breakfast  piperacillin/tazobactam IVPB. 3.375 Gram(s) IV Intermittent every 8 hours  sodium chloride 0.9%. 1000 milliLiter(s) (100 mL/Hr) IV Continuous <Continuous>    MEDICATIONS  (PRN):  acetaminophen   Tablet 650 milliGRAM(s) Oral every 6 hours PRN For Temp greater than 38 C (100.4 F)  acetaminophen   Tablet. 650 milliGRAM(s) Oral every 6 hours PRN Mild Pain (1 - 3)  morphine  - Injectable 2 milliGRAM(s) IV Push every 4 hours PRN Moderate Pain (4 - 6)  morphine  - Injectable 4 milliGRAM(s) IV Push every 4 hours PRN Severe Pain (7 - 10)  ondansetron Injectable 4 milliGRAM(s) IV Push every 6 hours PRN Nausea      REVIEW OF SYSTEMS:    CONSTITUTIONAL: No fevers or chills, no weight change, appetite change  Throat: No Dysphagia, No Odynophagia  RESPIRATORY: No SOB  CARDIOVASCULAR: No chest pain   Muscloskeletal: no joints pain  NEUROLOGICAL: No syncope or diziness  SKIN: No pruritis  Heme/Lymph: no LN enlargement         VITALS:   T(F): 97.2 (07-07 @ 07:22), Max: 102.9 (07-05 @ 19:42)  HR: 62 (07-07 @ 07:22) (62 - 113)  BP: 98/55 (07-07 @ 07:22) (98/55 - 139/79)  BP(mean): 69 (07-06 @ 16:00) (69 - 69)  RR: 18 (07-07 @ 07:22) (18 - 18)  SpO2: 100% (07-06 @ 10:55) (97% - 100%)        PHYSICAL EXAM:  EYES: No scleral icterus   LUNG: Clear to auscultation bilaterally; No rales, rhonchi, wheezing, or rubs  HEART: RRR; No murmurs  ABDOMEN: Soft, +BS, mild epigastric Tenderness, No guarding, No Sahni Sign   Rectal Exam: not performed     Blood Work :                        10.3   6.65  )-----------( 178      ( 07 Jul 2018 01:14 )             32.6     PT/INR - ( 06 Jul 2018 01:18 )  INR: 1.62          PTT - ( 06 Jul 2018 01:18 )  PTT:31.0   07-07    142  |  107  |  9<L>  ----------------------------<  91  3.7   |  20  |  0.6<L>    Ca    7.9<L>      07 Jul 2018 01:14  Phos  2.4     07-07  Mg     2.0     07-07      CBC -  ( 07 Jul 2018 01:14 )  Hemoglobin : 10.3    CBC -  ( 06 Jul 2018 19:05 )  Hemoglobin : 12.0    CBC -  ( 06 Jul 2018 01:18 )  Hemoglobin : 11.3    CBC -  ( 05 Jul 2018 20:51 )  Hemoglobin : 11.7    CBC -  ( 05 Jul 2018 10:25 )  Hemoglobin : 12.7      LIVER FUNCTIONS - ( 07 Jul 2018 01:14 )  Alb: 3.4 [3.5 - 5.2] / Pro: 5.6 [6.0 - 8.0] / ALK PHOS: 105 [30 - 115] / ALT: 232 [0 - 41] / AST: 89 [0 - 41] / GGT: x     LIVER FUNCTIONS - ( 06 Jul 2018 19:05 )  Alb: 3.7 [3.5 - 5.2] / Pro: 6.0 [6.0 - 8.0] / ALK PHOS: 114 [30 - 115] / ALT: 290 [0 - 41] / AST: 117 [0 - 41] / GGT: x     LIVER FUNCTIONS - ( 06 Jul 2018 01:18 )  Alb: 3.6 [3.5 - 5.2] / Pro: 5.7 [6.0 - 8.0] / ALK PHOS: 108 [30 - 115] / ALT: 365 [0 - 41] / AST: 245 [0 - 41] / GGT: x     LIVER FUNCTIONS - ( 05 Jul 2018 20:51 )  Alb: 3.9 [3.5 - 5.2] / Pro: 6.1 [6.0 - 8.0] / ALK PHOS: 123 [30 - 115] / ALT: 438 [0 - 41] / AST: 358 [0 - 41] / GGT: x     LIVER FUNCTIONS - ( 05 Jul 2018 10:25 )  Alb: 4.4 [3.5 - 5.2] / Pro: 7.3 [6.0 - 8.0] / ALK PHOS: 135 [30 - 115] / ALT: 610 [0 - 41] / AST: x / GGT: x         RADIOLOGY:

## 2018-07-07 NOTE — BRIEF OPERATIVE NOTE - PROCEDURE
<<-----Click on this checkbox to enter Procedure Cholecystectomy, laparoscopic, without cholangiogram  07/07/2018    Active  Ronn Patterson

## 2018-07-07 NOTE — BRIEF OPERATIVE NOTE - PROCEDURE
<<-----Click on this checkbox to enter Procedure Cholecystectomy, laparoscopic, without cholangiogram  07/07/2018    [facility]

## 2018-07-07 NOTE — CHART NOTE - NSCHARTNOTEFT_GEN_A_CORE
PACU ANESTHESIA PACU ADMISSION NOTE      Procedure:Cholecystectomy, laparoscopic, without cholangiogram    Post op diagnosisAcute cholecystitis      ____ Intubated  TV:______       Rate: ______      FiO2: ______    ___X_ Patent Airway    ___X_ Full return of protective reflexes    ____ Full recovery from anesthesia / sedation to baseline status    Viitals:  T(C): 36.6 (07-07-18 @ 19:14), Max: 38.7 (07-07-18 @ 00:00)  HR: 58 (07-07-18 @ 19:14) (58 - 74)  BP: 124/85 (07-07-18 @ 19:14) (98/55 - 124/85)  RR: 17 (07-07-18 @ 19:14) (17 - 18)  SpO2: 96% (07-07-18 @ 19:14) (96% - 96%)      SEE ANESTHESIA RECORD      Mental Status:  __X__ Awake   _____ Alert   _____ Drowsy   _____ Sedated    Nausea/Vomiting: ____ Yes, See Post - Op Orders      ___X_ No    Pain Scale (0-10): _____    Treatment: ____ None    __X__ See Post - Op/PCA Orders    Post - Operative Fluids:   ____ Oral   __X__ See Post - Op Orders    Plan:         Discharge:   ____Home       ____X_Floor         _____Critical Care    _____Other:_________________    Comments: UNEVENTFUL

## 2018-07-08 ENCOUNTER — TRANSCRIPTION ENCOUNTER (OUTPATIENT)
Age: 23
End: 2018-07-08

## 2018-07-08 VITALS
SYSTOLIC BLOOD PRESSURE: 121 MMHG | OXYGEN SATURATION: 98 % | TEMPERATURE: 97 F | DIASTOLIC BLOOD PRESSURE: 79 MMHG | HEART RATE: 68 BPM | RESPIRATION RATE: 18 BRPM

## 2018-07-08 LAB
GRAM STN FLD: SIGNIFICANT CHANGE UP
SPECIMEN SOURCE: SIGNIFICANT CHANGE UP

## 2018-07-08 PROCEDURE — 99233 SBSQ HOSP IP/OBS HIGH 50: CPT

## 2018-07-08 RX ORDER — HEPARIN SODIUM 5000 [USP'U]/ML
5000 INJECTION INTRAVENOUS; SUBCUTANEOUS EVERY 8 HOURS
Qty: 0 | Refills: 0 | Status: DISCONTINUED | OUTPATIENT
Start: 2018-07-08 | End: 2018-07-08

## 2018-07-08 RX ADMIN — Medication 650 MILLIGRAM(S): at 05:36

## 2018-07-08 RX ADMIN — HEPARIN SODIUM 5000 UNIT(S): 5000 INJECTION INTRAVENOUS; SUBCUTANEOUS at 05:25

## 2018-07-08 RX ADMIN — Medication 650 MILLIGRAM(S): at 05:25

## 2018-07-08 RX ADMIN — Medication 600 MILLIGRAM(S): at 12:59

## 2018-07-08 RX ADMIN — Medication 600 MILLIGRAM(S): at 00:10

## 2018-07-08 RX ADMIN — Medication 600 MILLIGRAM(S): at 13:00

## 2018-07-08 RX ADMIN — Medication 650 MILLIGRAM(S): at 00:10

## 2018-07-08 RX ADMIN — Medication 600 MILLIGRAM(S): at 00:08

## 2018-07-08 RX ADMIN — Medication 600 MILLIGRAM(S): at 05:25

## 2018-07-08 RX ADMIN — Medication 650 MILLIGRAM(S): at 00:08

## 2018-07-08 RX ADMIN — Medication 650 MILLIGRAM(S): at 13:00

## 2018-07-08 RX ADMIN — Medication 650 MILLIGRAM(S): at 12:59

## 2018-07-08 RX ADMIN — Medication 600 MILLIGRAM(S): at 05:36

## 2018-07-08 NOTE — DISCHARGE NOTE ADULT - CARE PLAN
Principal Discharge DX:	Calculus of bile duct without cholecystitis and without obstruction  Goal:	Continue ambulating, low fat diet, tylenol and motrin around the clock for pain management. Follow up in clinic with Dr. Patterson in 1 week  Assessment and plan of treatment:	Pt s/p cholecystectomy. Continue ambulating, low fat diet, tylenol and motrin around the clock for pain management. Follow up in clinic with Dr. Patterson in 1 week

## 2018-07-08 NOTE — DISCHARGE NOTE ADULT - MEDICATION SUMMARY - MEDICATIONS TO TAKE
I will START or STAY ON the medications listed below when I get home from the hospital:    oxyCODONE-acetaminophen 5 mg-325 mg oral tablet  -- 1 tab(s) by mouth every 4 hours, As needed, Severe Pain (7 - 10) MDD:3  -- Indication: For Pain

## 2018-07-08 NOTE — PROGRESS NOTE ADULT - ATTENDING COMMENTS
stable, afebrile.  incisional pain.  tolerated diet.  abdomen is soft, NT, ND, BS present.  dressings dry and clean.    OOB, IS.  regular diet.  d/c home today.  followup in my office in 8 days.  Plan of care fully d/w patient and .  GI to remove stent in 6 weeks.
Stable and afebrile now, febrile last night.  S/p ERCP and stent.    Still with RUQ pain and tenderness.    Ate full liquid diet this morning.    I offered laparoscopic cholecystectomy, possible open procedure.  Patient wants to have the surgery ASAP.    NPO, IVF.  Added on for today.  Plan of care, risks, benefits, and consequences fully d/w patient and .
Patient seen in Endo and discussed ERCp findings with GI . will let patient recover and decide about lap tree early next week

## 2018-07-08 NOTE — PROGRESS NOTE ADULT - SUBJECTIVE AND OBJECTIVE BOX
Progress Note: General Surgery  Patient: BISHNU LEVY , 23y (1995)Female   MRN: [id number]  Location: [address]  Visit: 07-05-18 Inpatient  Date: 07-08-18 @ 04:13  Hospital Day: 3  Post-op Day: 1    Procedure/Diagnosis: s/p lap rtee   Events over 24h: Patient underwent ERCP and stent placement previously in admission but when conditioned worsened with pain, and fevers Patient went for a lap tree last night at approximately 730PM. Surgery went well and she was moved to the PACU and eventually the floor without issues or complications. She is tolerating a regular diet without nausea or vomiting and her pain is minimal.       Vitals: T(F): 97.2 (07-08-18 @ 00:00), Max: 97.9 (07-07-18 @ 15:37)  HR: 71 (07-08-18 @ 00:00)  BP: 139/83 (07-08-18 @ 00:00) (98/55 - 139/83)  RR: 18 (07-08-18 @ 00:00)  SpO2: 98% (07-07-18 @ 22:20)    In:   07-07-18 @ 07:01  -  07-08-18 @ 04:13  --------------------------------------------------------  IN: 450 mL      Out:   07-07-18 @ 07:01  -  07-08-18 @ 04:13  --------------------------------------------------------  OUT:    Voided: 300 mL  Total OUT: 300 mL        Net:   07-07-18 @ 07:01  -  07-08-18 @ 04:13  --------------------------------------------------------  NET: 150 mL      Diet: Diet, Regular (07-07-18 @ 21:31)    IV Fluids: no, IVL  Physical Examination:  General Appearance: NAD, alert and cooperative  HEENT: NCAT, WNL  Heart: S1 and S2. No murmurs. Rhythm is regular irregular.   Lungs: Clear to auscultation BL without rales, rhonchi, wheezing, crackles or diminished breath sounds.  Abdomen:  +BS. Soft, nondistended, diana-incisional tenderness, no erythema, Obese. No rigidity, guarding, or rebound tenderness.   MSK/Extremities: ROM intact BL upper/lower extremities. No joint erythema or tenderness. Normal gait. No significant deformity or joint abnormality. No edema. Peripheral pulses intact.   Neuro: CN II-XII intact. Strength and sensation symmetric and intact throughout. Sensation grossly intact.  Skin: Warm/dry, Normal color, No jaundice.   Incisions/Wounds: Dressings in place, clean, dry and intact, no signs of infection/active bleeding/drainage    Medications: [Standing]  acetaminophen   Tablet. 650 milliGRAM(s) Oral every 6 hours  ibuprofen  Tablet 600 milliGRAM(s) Oral every 6 hours    DVT Prophylaxis:   GI Prophylaxis:   Antibiotics:   Anticoagulation:   Medications:[PRN]  oxyCODONE    5 mG/acetaminophen 325 mG 1 Tablet(s) Oral every 4 hours PRN    Labs:                        10.3   6.65  )-----------( 178      ( 07 Jul 2018 01:14 )             32.6     07-07    142  |  107  |  9<L>  ----------------------------<  91  3.7   |  20  |  0.6<L>    Ca    7.9<L>      07 Jul 2018 01:14  Phos  2.4     07-07  Mg     2.0     07-07    TPro  5.6<L>  /  Alb  3.4<L>  /  TBili  2.7<H>  /  DBili  2.5<H>  /  AST  89<H>  /  ALT  232<H>  /  AlkPhos  105  07-07    LIVER FUNCTIONS - ( 07 Jul 2018 01:14 )  Alb: 3.4 g/dL / Pro: 5.6 g/dL / ALK PHOS: 105 U/L / ALT: 232 U/L / AST: 89 U/L / GGT: x           Urine/Micro:    Culture - Body Fluid with Gram Stain (collected 07 Jul 2018 20:00)  Source: .Body Fluid None  Gram Stain (08 Jul 2018 03:52):    No polymorphonuclear cells seen    Gram Variable Cocci seen    by cytocentrifuge    Assessment:  23y Female patient admitted S/P lap cholecystectomy now POD 1 and doing well     Plan:  - Regular diet  - Continue around the clock tylenol and motrin for pain control with PRN oxycodone   - Encourage ambulation   - IS, HSQ, PPI  - If doing well consider d/c home today       Date/Time: 07-08-18 @ 04:13

## 2018-07-08 NOTE — DISCHARGE NOTE ADULT - CARE PROVIDER_API CALL
Ronn Patterson), Surgery  [address] [place] [address]  [address] [place] [address]  Phone: [phone]  Fax: [phone]

## 2018-07-08 NOTE — DISCHARGE NOTE ADULT - ADDITIONAL INSTRUCTIONS
Follow up in clinic with Dr. Patterson in 1 week. Call office for appointment. call [phone]. Follow up with PMD within 1 week. Keep wound clean and dry. Can remove the dressing in 2 days, don't remove the steri strips, can take a shower after removing the dressing. No strenous activity or heavy lifting. If experience fever, chest pain, shortness of breath, dizziness, vomiting , bleeding or drainage from wound call MD or return to ED. Take tylenol and motrin as needed for pain.

## 2018-07-08 NOTE — DISCHARGE NOTE ADULT - PLAN OF CARE
Continue ambulating, low fat diet, tylenol and motrin around the clock for pain management. Follow up in clinic with Dr. Patterson in 1 week Pt s/p cholecystectomy. Continue ambulating, low fat diet, tylenol and motrin around the clock for pain management. Follow up in clinic with Dr. Patterson in 1 week

## 2018-07-08 NOTE — DISCHARGE NOTE ADULT - HOSPITAL COURSE
23 year old patient presented to ED with PMHx gallstones x1year biliary colic, Fe++ def anemia, p/w sharp RUQ/epigastric pain since last night. Pt stated she was worked up for gallstones last year and followed up by surgeon Dr. Jason, but did not had surgery. pain exacerbated by fatty and spicy foods in the past, ate spicy food last night with worsening RUQ pain. Radiates to mid back and right shoulder. Assoc with nausea, and total of 6 non-biliary nonbloody emesis since 6 am the day of presentation. Patient also had 2 episodes of diarrhea, non bloody. Endorses subjective fever, had a low grade temp (99.9) in ED. Denies headache, lightheadedness, CP, SOB, dysuria, LMP 1 week ago. No vaginal bleeding or discharge. Patient was admitted for cholangitis with ERCP done which showed pus from CBD and sludge so a CBD stent was placed. Patient reported feeling better, liver enzymes trending down. Pt placed on Abx and IV fluids. Hb dropped to 10.3 but no evidence of active bleed. Patient subsequently underwent cholecystectomy, laparoscopic. Patient tolerated surgery well with no active complaints. Today reports feeling well, with mild incisional tenderness but no drainage or tenderness from the incision sites. Patient is ambulating, passing gas, and tolerating diet well. Patient appears ready for discharge.

## 2018-07-08 NOTE — DISCHARGE NOTE ADULT - PATIENT PORTAL LINK FT
You can access the [facility] Patient Portal, offered by [facility], by registering with the following website: [email or web address]

## 2018-07-08 NOTE — CHART NOTE - NSCHARTNOTEFT_GEN_A_CORE
POC @ 0130    Patient is lying comfortably in bed. She reports some mild soreness but no other pain. She was able to ambulate without issue and tolerated a small amount of a regular diet. Her abdomen is soft, non-distended, and only mildly tender to palpation just in the diana-incisional areas. She reports that her pain is well controlled and had no other concerns. POC @ 0130    Patient is lying comfortably in bed. She reports some mild soreness but no other pain. She was able to ambulate without issue and tolerated a small amount of a regular diet. Her abdomen is soft, non-distended, and only mildly tender to palpation just in the diana-incisional areas. Her dressings are clean, dry, and intact. She reports that her pain is well controlled and had no other concerns.

## 2018-07-12 LAB
CULTURE RESULTS: SIGNIFICANT CHANGE UP
SPECIMEN SOURCE: SIGNIFICANT CHANGE UP

## 2018-07-13 DIAGNOSIS — R17 UNSPECIFIED JAUNDICE: ICD-10-CM

## 2018-07-13 DIAGNOSIS — D50.9 IRON DEFICIENCY ANEMIA, UNSPECIFIED: ICD-10-CM

## 2018-07-13 DIAGNOSIS — K80.00 CALCULUS OF GALLBLADDER WITH ACUTE CHOLECYSTITIS WITHOUT OBSTRUCTION: ICD-10-CM

## 2018-07-13 DIAGNOSIS — K83.0 CHOLANGITIS: ICD-10-CM

## 2018-07-13 DIAGNOSIS — K80.20 CALCULUS OF GALLBLADDER WITHOUT CHOLECYSTITIS WITHOUT OBSTRUCTION: ICD-10-CM

## 2018-07-18 ENCOUNTER — APPOINTMENT (OUTPATIENT)
Dept: SURGERY | Facility: CLINIC | Age: 23
End: 2018-07-18
Payer: COMMERCIAL

## 2018-07-18 VITALS
BODY MASS INDEX: 31.65 KG/M2 | DIASTOLIC BLOOD PRESSURE: 80 MMHG | HEART RATE: 70 BPM | TEMPERATURE: 98.9 F | WEIGHT: 190 LBS | SYSTOLIC BLOOD PRESSURE: 122 MMHG | HEIGHT: 65 IN

## 2018-07-18 PROBLEM — D50.8 OTHER IRON DEFICIENCY ANEMIAS: Chronic | Status: ACTIVE | Noted: 2018-07-05

## 2018-07-18 PROCEDURE — 99024 POSTOP FOLLOW-UP VISIT: CPT

## 2018-08-24 NOTE — PROGRESS NOTE ADULT - I WAS PHYSICALLY PRESENT FOR THE KEY PORTIONS OF THE EVALUATION AND MANAGEMENT (E/M) SERVICE PROVIDED.  I AGREE WITH THE ABOVE HISTORY, PHYSICAL, AND PLAN WHICH I HAVE REVIEWED AND EDITED WHERE APPROPRIATE
Statement Selected
Instructed patient/caregiver regarding signs and symptoms of infection./Elevate the injured extremity as instructed./Verbal/written post procedure instructions were given to patient/caregiver./Instructed patient/caregiver to follow-up with primary care physician./Keep the cast/splint/dressing clean and dry.

## 2018-12-19 ENCOUNTER — TRANSCRIPTION ENCOUNTER (OUTPATIENT)
Age: 23
End: 2018-12-19

## 2018-12-19 ENCOUNTER — RESULT REVIEW (OUTPATIENT)
Age: 23
End: 2018-12-19

## 2018-12-19 ENCOUNTER — OUTPATIENT (OUTPATIENT)
Dept: OUTPATIENT SERVICES | Facility: HOSPITAL | Age: 23
LOS: 1 days | Discharge: HOME | End: 2018-12-19

## 2018-12-19 VITALS — RESPIRATION RATE: 18 BRPM | DIASTOLIC BLOOD PRESSURE: 88 MMHG | SYSTOLIC BLOOD PRESSURE: 113 MMHG | HEART RATE: 73 BPM

## 2018-12-19 VITALS
DIASTOLIC BLOOD PRESSURE: 72 MMHG | SYSTOLIC BLOOD PRESSURE: 118 MMHG | WEIGHT: 210.1 LBS | HEIGHT: 64 IN | RESPIRATION RATE: 18 BRPM | TEMPERATURE: 99 F | HEART RATE: 65 BPM

## 2018-12-19 DIAGNOSIS — Z90.49 ACQUIRED ABSENCE OF OTHER SPECIFIED PARTS OF DIGESTIVE TRACT: Chronic | ICD-10-CM

## 2018-12-19 DIAGNOSIS — Z98.890 OTHER SPECIFIED POSTPROCEDURAL STATES: Chronic | ICD-10-CM

## 2018-12-19 RX ORDER — KETOROLAC TROMETHAMINE 30 MG/ML
30 SYRINGE (ML) INJECTION ONCE
Qty: 0 | Refills: 0 | Status: DISCONTINUED | OUTPATIENT
Start: 2018-12-19 | End: 2018-12-19

## 2018-12-19 RX ORDER — ACETAMINOPHEN 500 MG
975 TABLET ORAL ONCE
Qty: 0 | Refills: 0 | Status: DISCONTINUED | OUTPATIENT
Start: 2018-12-19 | End: 2019-01-03

## 2018-12-19 RX ORDER — SODIUM CHLORIDE 9 MG/ML
1000 INJECTION, SOLUTION INTRAVENOUS
Qty: 0 | Refills: 0 | Status: DISCONTINUED | OUTPATIENT
Start: 2018-12-19 | End: 2019-01-03

## 2018-12-19 NOTE — H&P PST ADULT - ASSESSMENT
22 yo F with PMH mentioned here for ERCP for stent removal, Patient had ERCP in July2018 for acute cholangitis and had a plastic CBD stent placed.   Risks and benefits discussed with the patient.  Will proceed with the scheduled case. 24 yo F with PMH mentioned here for ERCP for stent removal, Patient had ERCP in July2018 for acute cholangitis and had a plastic CBD stent placed.   EGD will be performed as well for abdominal pain  Risks and benefits discussed with the patient.  Will proceed with the scheduled case.

## 2018-12-19 NOTE — H&P PST ADULT - HISTORY OF PRESENT ILLNESS
24 yo F with PMH mentioned here for ERCP for stent removal, Patient had ERCP in July2018 for acute cholangitis and had a plastic CBD stent placed. 24 yo F with PMH mentioned here for ERCP for stent removal, Patient had ERCP in July2018 for acute cholangitis and had a plastic CBD stent placed.   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

## 2018-12-19 NOTE — ASU DISCHARGE PLAN (ADULT/PEDIATRIC). - NOTIFY
Swelling that continues/Pain not relieved by Medications/Inability to Tolerate Liquids or Foods/Persistent Nausea and Vomiting/Unable to Urinate/Fever greater than 101/Bleeding that does not stop/Numbness, color, or temperature change to extremity

## 2018-12-20 LAB — SURGICAL PATHOLOGY STUDY: SIGNIFICANT CHANGE UP

## 2018-12-26 DIAGNOSIS — K80.20 CALCULUS OF GALLBLADDER WITHOUT CHOLECYSTITIS WITHOUT OBSTRUCTION: ICD-10-CM

## 2018-12-26 DIAGNOSIS — K29.50 UNSPECIFIED CHRONIC GASTRITIS WITHOUT BLEEDING: ICD-10-CM

## 2018-12-26 DIAGNOSIS — B96.81 HELICOBACTER PYLORI [H. PYLORI] AS THE CAUSE OF DISEASES CLASSIFIED ELSEWHERE: ICD-10-CM

## 2018-12-26 DIAGNOSIS — K29.80 DUODENITIS WITHOUT BLEEDING: ICD-10-CM

## 2019-01-13 PROBLEM — K80.43 CALCULUS OF BILE DUCT WITH ACUTE CHOLECYSTITIS AND OBSTRUCTION: Status: ACTIVE | Noted: 2018-07-18

## 2019-01-14 ENCOUNTER — APPOINTMENT (OUTPATIENT)
Dept: SURGERY | Facility: CLINIC | Age: 24
End: 2019-01-14

## 2019-01-14 DIAGNOSIS — K80.43 CALCULUS OF BILE DUCT WITH ACUTE CHOLECYSTITIS WITH OBSTRUCTION: ICD-10-CM

## 2019-05-07 ENCOUNTER — EMERGENCY (EMERGENCY)
Facility: HOSPITAL | Age: 24
LOS: 0 days | Discharge: HOME | End: 2019-05-08
Attending: EMERGENCY MEDICINE | Admitting: EMERGENCY MEDICINE
Payer: COMMERCIAL

## 2019-05-07 VITALS
SYSTOLIC BLOOD PRESSURE: 131 MMHG | WEIGHT: 217.82 LBS | HEART RATE: 106 BPM | HEIGHT: 65 IN | OXYGEN SATURATION: 98 % | TEMPERATURE: 99 F | RESPIRATION RATE: 18 BRPM | DIASTOLIC BLOOD PRESSURE: 58 MMHG

## 2019-05-07 VITALS
HEART RATE: 82 BPM | SYSTOLIC BLOOD PRESSURE: 128 MMHG | OXYGEN SATURATION: 98 % | TEMPERATURE: 98 F | RESPIRATION RATE: 18 BRPM | DIASTOLIC BLOOD PRESSURE: 60 MMHG

## 2019-05-07 DIAGNOSIS — R11.2 NAUSEA WITH VOMITING, UNSPECIFIED: ICD-10-CM

## 2019-05-07 DIAGNOSIS — Z98.890 OTHER SPECIFIED POSTPROCEDURAL STATES: Chronic | ICD-10-CM

## 2019-05-07 DIAGNOSIS — R19.7 DIARRHEA, UNSPECIFIED: ICD-10-CM

## 2019-05-07 DIAGNOSIS — Z90.49 ACQUIRED ABSENCE OF OTHER SPECIFIED PARTS OF DIGESTIVE TRACT: Chronic | ICD-10-CM

## 2019-05-07 LAB
ALBUMIN SERPL ELPH-MCNC: 4.3 G/DL — SIGNIFICANT CHANGE UP (ref 3.5–5.2)
ALP SERPL-CCNC: 93 U/L — SIGNIFICANT CHANGE UP (ref 30–115)
ALT FLD-CCNC: 36 U/L — SIGNIFICANT CHANGE UP (ref 0–41)
ANION GAP SERPL CALC-SCNC: 13 MMOL/L — SIGNIFICANT CHANGE UP (ref 7–14)
AST SERPL-CCNC: 18 U/L — SIGNIFICANT CHANGE UP (ref 0–41)
BILIRUB SERPL-MCNC: <0.2 MG/DL — SIGNIFICANT CHANGE UP (ref 0.2–1.2)
BUN SERPL-MCNC: 12 MG/DL — SIGNIFICANT CHANGE UP (ref 10–20)
CALCIUM SERPL-MCNC: 9 MG/DL — SIGNIFICANT CHANGE UP (ref 8.5–10.1)
CHLORIDE SERPL-SCNC: 105 MMOL/L — SIGNIFICANT CHANGE UP (ref 98–110)
CO2 SERPL-SCNC: 24 MMOL/L — SIGNIFICANT CHANGE UP (ref 17–32)
CREAT SERPL-MCNC: 0.7 MG/DL — SIGNIFICANT CHANGE UP (ref 0.7–1.5)
GLUCOSE SERPL-MCNC: 110 MG/DL — HIGH (ref 70–99)
HCG SERPL QL: NEGATIVE — SIGNIFICANT CHANGE UP
HCT VFR BLD CALC: 39.3 % — SIGNIFICANT CHANGE UP (ref 37–47)
HGB BLD-MCNC: 12.5 G/DL — SIGNIFICANT CHANGE UP (ref 12–16)
LIDOCAIN IGE QN: 21 U/L — SIGNIFICANT CHANGE UP (ref 7–60)
MCHC RBC-ENTMCNC: 25.8 PG — LOW (ref 27–31)
MCHC RBC-ENTMCNC: 31.8 G/DL — LOW (ref 32–37)
MCV RBC AUTO: 81.2 FL — SIGNIFICANT CHANGE UP (ref 81–99)
NRBC # BLD: 0 /100 WBCS — SIGNIFICANT CHANGE UP (ref 0–0)
PLATELET # BLD AUTO: 151 K/UL — SIGNIFICANT CHANGE UP (ref 130–400)
POTASSIUM SERPL-MCNC: 3.5 MMOL/L — SIGNIFICANT CHANGE UP (ref 3.5–5)
POTASSIUM SERPL-SCNC: 3.5 MMOL/L — SIGNIFICANT CHANGE UP (ref 3.5–5)
PROT SERPL-MCNC: 7.1 G/DL — SIGNIFICANT CHANGE UP (ref 6–8)
RBC # BLD: 4.84 M/UL — SIGNIFICANT CHANGE UP (ref 4.2–5.4)
RBC # FLD: 14.6 % — HIGH (ref 11.5–14.5)
SODIUM SERPL-SCNC: 142 MMOL/L — SIGNIFICANT CHANGE UP (ref 135–146)
WBC # BLD: 9.82 K/UL — SIGNIFICANT CHANGE UP (ref 4.8–10.8)
WBC # FLD AUTO: 9.82 K/UL — SIGNIFICANT CHANGE UP (ref 4.8–10.8)

## 2019-05-07 PROCEDURE — 99284 EMERGENCY DEPT VISIT MOD MDM: CPT

## 2019-05-07 RX ORDER — SODIUM CHLORIDE 9 MG/ML
1000 INJECTION INTRAMUSCULAR; INTRAVENOUS; SUBCUTANEOUS ONCE
Qty: 0 | Refills: 0 | Status: COMPLETED | OUTPATIENT
Start: 2019-05-07 | End: 2019-05-07

## 2019-05-07 RX ORDER — ONDANSETRON 8 MG/1
1 TABLET, FILM COATED ORAL
Qty: 20 | Refills: 0 | OUTPATIENT
Start: 2019-05-07 | End: 2019-05-11

## 2019-05-07 RX ORDER — FAMOTIDINE 10 MG/ML
20 INJECTION INTRAVENOUS ONCE
Qty: 0 | Refills: 0 | Status: COMPLETED | OUTPATIENT
Start: 2019-05-07 | End: 2019-05-07

## 2019-05-07 RX ORDER — ONDANSETRON 8 MG/1
8 TABLET, FILM COATED ORAL ONCE
Qty: 0 | Refills: 0 | Status: COMPLETED | OUTPATIENT
Start: 2019-05-07 | End: 2019-05-07

## 2019-05-07 RX ADMIN — SODIUM CHLORIDE 1000 MILLILITER(S): 9 INJECTION INTRAMUSCULAR; INTRAVENOUS; SUBCUTANEOUS at 22:33

## 2019-05-07 RX ADMIN — ONDANSETRON 8 MILLIGRAM(S): 8 TABLET, FILM COATED ORAL at 22:33

## 2019-05-07 RX ADMIN — Medication 20 MILLIGRAM(S): at 22:33

## 2019-05-07 NOTE — ED PROVIDER NOTE - CPE EDP [facility] NORM
Transplant Nephrology Initial Consult  August 8, 2018      Harshad Beatty MRN:[id number] YOB: 1982  Date of Admission:8/7/2018  Primary care provider: [facility] [place]  Requesting physician: Bety Mitchell MD    ASSESSMENT AND RECOMMENDATIONS:   # Kidney Tx - h/o ESRD secondary to HTN who is now s/p LDKT on 8/7/2018. 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 He has been on HD since 8/2015 and dialyzes on MWF schedule via his right arm fistula. His EDW is 118kg.  Post op course was complicated by hypotension after thyroglobulin administration which required pressors briefly.   Post operatively his creat was 9.7 and is down to 9.58 this morning. He has put out 4.6Litres as of this morning.   -would continue to monitor renal fucntion.   - would check UPC prior to discharge although his primary disease is due to HTN    # Immunosuppression -  Induced with ATG, cellcept and steroids. Received 2nd dose of ATF this morning without complication. Started on envarsus per protocol. Goal trough level of 8-10.      # Hypertension- was hypotensive requiring pressors briefly now elevated. Home meds included amlodipine 5mg daily, coreg 25mg BID, lisinopril 40 mg daily and minoxdil 10 mg daily. On IVF.   - if his SBP continues to be above 165- would start him on his home dose coreg 25mg BID.     # Diabetes- no h/o DM. Post operatively elevated due to steroids. Management per primary.     # Anemia- Hgb postoperatively stable at ~10. Normocytic. Iron replete.     # Secondary hyperparathyroidism  # Vitamin D deficiency  # Hypophosphatemia  - iPTH prior to surgery on 7/2018 was 168. This will be followed as outpatient. Vit D low at 9. -Would start on 2000U of cholecalciferol daily.   - calcium okay. Phos mildly elevated. Expected to improve with renal function. Hold all binders.     # Hypomagnesemia- Mag okay at present. Will be  started on mag oxide 400mg daily per protocol.     # PCP prophylaxis- will be started on bactrim which will be adjusted to his GFR.     # Viral prophylaxis- Donor and recipient positive for Ab to EBV and CMV. Will be started on valcyte per his GFR.     Recommendations were communicated to primary team via note    REASON FOR CONSULT:  New LDKT    HISTORY OF PRESENT ILLNESS:  Harshad Beatty is a 36 year old with h/o ESRD secondary to HTN who is now s/p LDKT on 8/7/2018. 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 He has been on HD since 8/2015 and dialyzes on MWF schedule via his right arm fistula. His EDW is 118kg.  He was admitted postoperatively to the ICU for hypotension after receiving thyroglobulin to the point of receiving pressor support briefly.    Today he has no complaints. Post operative pain well controlled. Has not passed gas yet. \    PAST MEDICAL HISTORY:  Reviewed with patient on 08/08/2018     Past Medical History:   Diagnosis Date     Anemia in chronic kidney disease      ESRD (end stage renal disease) (H)      History of cardiomyopathy      HTN (hypertension)      Secondary hyperparathyroidism (H)      Sickle cell trait (H)        Past Surgical History:   Procedure Laterality Date     CREATE FISTULA ARTERIOVENOUS UPPER EXTREMITY       ORIF right 5th metatarpal fracture          MEDICATIONS:  PTA Meds  Prior to Admission medications    Medication Sig Last Dose Taking? Auth Provider   calcium acetate (PHOSLO) 667 MG CAPS capsule Take 2,668 mg by mouth 3 times daily (with meals) 8/6/2018 at 1200 Yes Reported, Patient   Cinacalcet HCl (SENSIPAR PO) Take 60 mg by mouth daily 8/6/2018 at Unknown time Yes Reported, Patient   AMLODIPINE BESYLATE PO Take 5 mg by mouth daily 8/5/2018 at 0800  Reported, Patient   carvedilol (COREG) 25 MG tablet Take 2 tablets by mouth daily 8/5/2018 at 0800  Reported, Patient   LISINOPRIL PO Take 80 mg by  mouth daily 2018 at 0800  Reported, Patient   MINOXIDIL PO Take 10 mg by mouth daily 2018 at 0700  Reported, Patient      Current Meds    atorvastatin  10 mg Oral Daily     [START ON 2018] magnesium oxide  400 mg Oral Daily with lunch     mycophenolate  1,000 mg Oral BID IS     senna-docusate  1 tablet Oral BID    Or     senna-docusate  2 tablet Oral BID     sodium chloride (PF)  10 mL Intracatheter Q8H     sodium chloride (PF)  3 mL Intracatheter Q8H     sodium chloride (PF)  3 mL Intracatheter Q8H     sulfamethoxazole-trimethoprim  1 tablet Oral Daily     [START ON 2018] tacrolimus  0.1 mg/kg Oral QAM AC     [START ON 2018] valGANciclovir  450 mg Oral Once per day on Mon Thu     Infusion Meds    dextrose 5% and 0.9% NaCl 100 mL/hr at 18 1011     HYDROmorphone       NaCl 212 mL/hr at 18 1000     NaCl 1,000 mL (18 0400)       ALLERGIES:    No Known Allergies    REVIEW OF SYSTEMS:  A 10 point review of systems was negative except as noted above.    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 [employer]     Social History Main Topics     Smoking status: Never Smoker     Smokeless tobacco: Never Used     Alcohol use No     Drug use: Yes     Special: Marijuana      Comment: remote     Sexual activity: Yes     Partners: Female     Birth control/ protection: Implant     Other Topics Concern     Not on file     Social History Narrative     Reviewed with patient     FAMILY MEDICAL HISTORY:   Family History   Problem Relation Age of Onset     Sickle Cell Anemia Father      Sickle Cell Trait Sister      Pancreatic Cancer Maternal Grandfather      KIDNEY DISEASE Cousin      Reviewed with patient     PHYSICAL EXAM:   Temp  Av  F (36.7  C)  Min: 96.6  F (35.9  C)  Max: 99.5  F (37.5  C)  Arterial Line MAP (mmHg)  Av.9 mmHg  Min:  "63 mmHg  Max: 102 mmHg  Arterial Line BP  Min: 111/51  Max: 168/90      Pulse  Av.7  Min: 76  Max: 89 Resp  Av.8  Min: 16  Max: 37  SpO2  Av %  Min: 87 %  Max: 100 %    CVP (mmHg): 6 mmHg  /72  Pulse 80  Temp 98  F (36.7  C) (Axillary)  Resp (!) 37  Ht 1.8 m (5' 10.87\")  Wt 120.1 kg (264 lb 12.4 oz)  SpO2 94%  BMI 37.07 kg/m2   Date 18 07 - 18 0659   Shift 7056-1084 0702-4962 6826-3008 24 Hour Total   I  N  T  A  K  E   P.O. 400   400    I.V. 1948    IV Piggyback 20.3   20.3    Shift Total  (mL/kg) 2368.3  (19.72)   2368.3  (19.72)   O  U  T  P  U  T   Urine 2725   2725    Drains 11   11    Shift Total  (mL/kg) 2736  (22.78)   2736  (22.78)   Weight (kg) 120.1 120.1 120.1 120.1        Admit Weight: 120.1 kg (264 lb 12.4 oz)     GENERAL APPEARANCE: alert and no distress  Head NC/AT  EYES: no scleral icterus, pupils equal  HENT: mouth  without ulcers or lesions  NECK: supple, no goiter  Lymphatics: no cervical or supraclavicular LAD  Pulmonary: lungs clear to auscultation with equal breath sounds bilaterally, no clubbing or cyanosis  CV: regular rhythm,    - Edema- none.   GI: soft, nontender, normal bowel sounds, no HSM   MS: no evidence of inflammation in joints, no muscle tenderness  : + Peres  SKIN: no rash, warm, dry  NEURO: mentation intact and speech normal    LABS:   CMP  Recent Labs  Lab 18  0423 18  2310 18  1304    132* 134  --    POTASSIUM 4.0 3.7 3.5 4.2   CHLORIDE 97 94  --   --    CO2 28 29  --   --    ANIONGAP 8 9  --   --    * 141* 114*  --    BUN 28 27  --   --    CR 9.58* 9.77*  --   --    GFRESTIMATED 6* 6*  --   --    GFRESTBLACK 7* 7*  --   --    JUAN 8.8 8.6  --   --    MAG 2.1 2.0  --   --    PHOS 5.4* 4.1  --   --      CBC  Recent Labs  Lab 18  0423 18  2310 18   HGB 10.9* 10.9* 9.7*   WBC 6.4 8.4  --    RBC 3.89* 3.86*  --    HCT 33.6* 33.2*  --    MCV 86 86  --    MCH 28.0 " 28.2  --    MCHC 32.4 32.8  --    RDW 14.5 14.3  --     211  --      INRNo lab results found in last 7 days.  ABG  Recent Labs  Lab 08/07/18 2006   PH 7.49*   PCO2 37   PO2 83   HCO3 28   O2PER 40.0      URINE STUDIES  Recent Labs   Lab Test  07/30/18   1142  05/31/17   0726   COLOR  Straw  Straw   APPEARANCE  Clear  Clear   URINEGLC  50*  50*   URINEBILI  Negative  Negative   URINEKETONE  Negative  Negative   SG  1.008  1.004   UBLD  Small*  Small*   URINEPH  8.0*  8.0*   PROTEIN  100*  30*   NITRITE  Negative  Negative   LEUKEST  Negative  Negative   RBCU  1  1   WBCU  1  1     No lab results found.  PTH  Recent Labs   Lab Test  07/30/18   1132   PTHI  168*     IRON STUDIES  Recent Labs   Lab Test  07/30/18   1132   IRON  46   FEB  225*   IRONSAT  21   JET  716*       IMAGING:  All imaging studies reviewed by me.     Earle Patrick MD    Attestation:  This patient has been seen and evaluated by me, Juan Bahena MD.  I have reviewed the note and agree with plan of care as documented by the fellow.      normal...

## 2019-05-07 NOTE — ED PROVIDER NOTE - OBJECTIVE STATEMENT
25 y/o F presents w 2 days of n/v/d/ No abd pain. Reports some epigastric burning after vomiting. Diarrhea is non-bloody. No flank pain. No CP, SOB. No palpitations. No hematuria

## 2019-05-07 NOTE — ED PROVIDER NOTE - CLINICAL SUMMARY MEDICAL DECISION MAKING FREE TEXT BOX
Pt feeling better. No episodes of vomiting in ED. Tolerated PO. Will DC w PMD f/u. I have full discussed the medical management and delivery of care with the patient. Patient confirms understanding and has been given detailed return precautions. Patient instructed to return to the ED should symptoms persist or worsen. Patient is well appearing upon discharge.

## 2019-05-07 NOTE — ED PROVIDER NOTE - NS HIV RISK FACTOR YES
Bedside shift change report given to me (oncoming nurse) by Batsheva Murray RN(offgoing nurse). Report included the following information SBAR, Kardex, Intake/Output, MAR and Recent Results. 0130: Pt seen and admitted to turning pump off when fluids were done. Have told patient multiple times during his stay here not to manipulate the pump. 8400: Pt PICC flushing but not giving back blood return on the purple side. Red is flushing and infusing with blood return but will not give back into tube. ED nurse called to help with lab draw. 0700: Went to heparinize Pt PICC, purple side is nearly unflushable and red side is very sluggish.  Fluids paused and will heparinized for 1hr.  
 
 
  
 
 
 
 Declined

## 2019-05-07 NOTE — ED ADULT TRIAGE NOTE - CHIEF COMPLAINT QUOTE
I never had food poisoning, but I have the symptoms, nausea, vomit and diarrhea with chills and fever since Monday  -patient

## 2019-05-31 ENCOUNTER — EMERGENCY (EMERGENCY)
Facility: HOSPITAL | Age: 24
LOS: 0 days | Discharge: HOME | End: 2019-05-31
Admitting: EMERGENCY MEDICINE
Payer: COMMERCIAL

## 2019-05-31 VITALS
TEMPERATURE: 98 F | OXYGEN SATURATION: 99 % | SYSTOLIC BLOOD PRESSURE: 114 MMHG | DIASTOLIC BLOOD PRESSURE: 73 MMHG | RESPIRATION RATE: 18 BRPM | HEART RATE: 112 BPM

## 2019-05-31 VITALS — HEART RATE: 87 BPM

## 2019-05-31 DIAGNOSIS — Z98.890 OTHER SPECIFIED POSTPROCEDURAL STATES: Chronic | ICD-10-CM
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
## 2019-05-31 DIAGNOSIS — M79.676 PAIN IN UNSPECIFIED TOE(S): ICD-10-CM

## 2019-05-31 DIAGNOSIS — Z90.49 ACQUIRED ABSENCE OF OTHER SPECIFIED PARTS OF DIGESTIVE TRACT: Chronic | ICD-10-CM

## 2019-05-31 DIAGNOSIS — Z79.83 LONG TERM (CURRENT) USE OF BISPHOSPHONATES: ICD-10-CM
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
## 2019-05-31 PROCEDURE — 73630 X-RAY EXAM OF FOOT: CPT | Mod: 26,LT

## 2019-05-31 PROCEDURE — 99283 EMERGENCY DEPT VISIT LOW MDM: CPT

## 2019-05-31 RX ORDER — IBUPROFEN 200 MG
1 TABLET ORAL
Qty: 28 | Refills: 0
Start: 2019-05-31 | End: 2019-06-06

## 2019-05-31 RX ORDER — IBUPROFEN 200 MG
400 TABLET ORAL ONCE
Refills: 0 | Status: COMPLETED | OUTPATIENT
Start: 2019-05-31 | End: 2019-05-31

## 2019-05-31 RX ADMIN — Medication 400 MILLIGRAM(S): at 11:34

## 2019-05-31 NOTE — ED PROVIDER NOTE - OBJECTIVE STATEMENT
23 y/o F, no significant PMHx, presents to the ED with complaints of left 1st toe pain s/p injury approximately three hours prior to ED arrival. Patient states that she kicked a wooden box with resultant pain and swelling to left 1st toe. She took Ibuprofen 200mg this AM ; denies numbness, tingling, and additional injuries.

## 2019-05-31 NOTE — ED ADULT NURSE NOTE - OBJECTIVE STATEMENT
Patient presents with pain and swelling to left foot first toe. Patient states she kicked a heavy wooden box with a lot of force 3 hours ago. Patient is having difficulty moving affected toe. Patient rates pain to toe as 6/10. No deformity noted to affected to extremity.

## 2019-05-31 NOTE — ED PROVIDER NOTE - CARE PROVIDERS DIRECT ADDRESSES
[email or web address]

## 2019-05-31 NOTE — ED PROVIDER NOTE - CARE PROVIDER_API CALL
Rufino Russell (DPM)  Podiatric Medicine and Surgery  [address] [place] [address]  Phone: [phone]  Fax: [phone]  Follow Up Time: Routine

## 2020-01-02 ENCOUNTER — EMERGENCY (EMERGENCY)
Facility: HOSPITAL | Age: 25
LOS: 0 days | Discharge: HOME | End: 2020-01-02
Attending: EMERGENCY MEDICINE | Admitting: EMERGENCY MEDICINE
Payer: COMMERCIAL

## 2020-01-02 VITALS
HEART RATE: 102 BPM | SYSTOLIC BLOOD PRESSURE: 132 MMHG | OXYGEN SATURATION: 98 % | TEMPERATURE: 98 F | RESPIRATION RATE: 20 BRPM | DIASTOLIC BLOOD PRESSURE: 73 MMHG

## 2020-01-02 VITALS — HEIGHT: 64 IN | WEIGHT: 199.96 LBS

## 2020-01-02 DIAGNOSIS — Z90.49 ACQUIRED ABSENCE OF OTHER SPECIFIED PARTS OF DIGESTIVE TRACT: Chronic | ICD-10-CM
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
## 2020-01-02 DIAGNOSIS — R10.13 EPIGASTRIC PAIN: ICD-10-CM

## 2020-01-02 DIAGNOSIS — R55 SYNCOPE AND COLLAPSE: ICD-10-CM

## 2020-01-02 DIAGNOSIS — S49.91XA UNSPECIFIED INJURY OF RIGHT SHOULDER AND UPPER ARM, INITIAL ENCOUNTER: ICD-10-CM

## 2020-01-02 DIAGNOSIS — Z98.890 OTHER SPECIFIED POSTPROCEDURAL STATES: Chronic | ICD-10-CM

## 2020-01-02 DIAGNOSIS — S60.222A CONTUSION OF LEFT HAND, INITIAL ENCOUNTER: ICD-10-CM

## 2020-01-02 DIAGNOSIS — M25.511 PAIN IN RIGHT SHOULDER: ICD-10-CM
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
## 2020-01-02 PROCEDURE — 99284 EMERGENCY DEPT VISIT MOD MDM: CPT

## 2020-01-02 PROCEDURE — 70450 CT HEAD/BRAIN W/O DYE: CPT | Mod: 26

## 2020-01-02 PROCEDURE — 73030 X-RAY EXAM OF SHOULDER: CPT | Mod: 26,RT

## 2020-01-02 PROCEDURE — 73130 X-RAY EXAM OF HAND: CPT | Mod: 26,LT

## 2020-01-02 PROCEDURE — 73080 X-RAY EXAM OF ELBOW: CPT | Mod: 26,RT

## 2020-01-02 RX ORDER — CYCLOBENZAPRINE HYDROCHLORIDE 10 MG/1
1 TABLET, FILM COATED ORAL
Qty: 15 | Refills: 0
Start: 2020-01-02 | End: 2020-01-06

## 2020-01-02 RX ORDER — FAMOTIDINE 10 MG/ML
20 INJECTION INTRAVENOUS ONCE
Refills: 0 | Status: COMPLETED | OUTPATIENT
Start: 2020-01-02 | End: 2020-01-02

## 2020-01-02 RX ORDER — OXYCODONE AND ACETAMINOPHEN 5; 325 MG/1; MG/1
1 TABLET ORAL ONCE
Refills: 0 | Status: DISCONTINUED | OUTPATIENT
Start: 2020-01-02 | End: 2020-01-02

## 2020-01-02 RX ADMIN — OXYCODONE AND ACETAMINOPHEN 1 TABLET(S): 5; 325 TABLET ORAL at 13:09

## 2020-01-02 RX ADMIN — OXYCODONE AND ACETAMINOPHEN 1 TABLET(S): 5; 325 TABLET ORAL at 12:20

## 2020-01-02 RX ADMIN — FAMOTIDINE 20 MILLIGRAM(S): 10 INJECTION INTRAVENOUS at 12:26

## 2020-01-02 NOTE — ED ADULT NURSE NOTE - OBJECTIVE STATEMENT
Pt was drinking previous night and still little drunk and slipped and fell down about 10 flights of stairs with loc noted by boyfriend. Awake and alert now.

## 2020-01-02 NOTE — ED PROVIDER NOTE - NSFOLLOWUPINSTRUCTIONS_ED_ALL_ED_FT
You work up did not show any serious sign of injury.  Please try not to drink alcohol in excess.  Take medications as prescribed and follow up with your primary doctor.    WHAT YOU NEED TO KNOW:    A contusion is a bruise that appears on your skin after an injury. A bruise happens when small blood vessels tear but skin does not. When blood vessels tear, blood leaks into nearby tissue, such as soft tissue or muscle.    DISCHARGE INSTRUCTIONS:  Return to the emergency department if:  You have new trouble moving the injured area.  You have tingling or numbness in or near the injured area.  Your hand or foot below the bruise gets cold or turns pale.  Contact your healthcare provider if:  You find a new lump in the injured area.  Your symptoms do not improve with treatment after 4 to 5 days.  You have questions or concerns about your condition or care.  Medicines:  You may need any of the following:    NSAIDs help decrease swelling and pain or fever. This medicine is available with or without a doctor's order. NSAIDs can cause stomach bleeding or kidney problems in certain people. If you take blood thinner medicine, always ask your healthcare provider if NSAIDs are safe for you. Always read the medicine label and follow directions.  Prescription pain medicine may be given. Do not wait until the pain is severe before you take your medicine.  Take your medicine as directed. Contact your healthcare provider if you think your medicine is not helping or if you have side effects. Tell him of her if you are allergic to any medicine. Keep a list of the medicines, vitamins, and herbs you take. Include the amounts, and when and why you take them. Bring the list or the pill bottles to follow-up visits. Carry your medicine list with you in case of an emergency.  Follow up with your healthcare provider as directed:  You may need to return within a week to check your injury again. Write down your questions so you remember to ask them during your visits.    Help a contusion heal:  Rest the injured area or use it less than usual. If you bruised your leg or foot, you may need crutches or a cane to help you walk. This will help you keep weight off your injured body part.  Apply ice to decrease swelling and pain. Ice may also help prevent tissue damage. Use an ice pack, or put crushed ice in a plastic bag. Cover it with a towel and place it on your bruise for 15 to 20 minutes every hour or as directed.  Use compression to support the area and decrease swelling. Wrap an elastic bandage around the area over the bruised muscle. Make sure the bandage is not too tight. You should be able to fit 1 finger between the bandage and your skin.  Elevate (raise) your injured body part above the level of your heart to help decrease pain and swelling. Use pillows, blankets, or rolled towels to elevate the area as often as you can.  Do not drink alcohol as directed. Alcohol may slow healing.  Do not stretch injured muscles right after your injury. Ask your healthcare provider when and how you may safely stretch after your injury. Gentle stretches can help increase your flexibility.  Do not massage the area or put heating pads on the bruise right after your injury. Heat and massage may slow healing. Your healthcare provider may tell you to apply heat after several days. At that time, heat will start to help the injury heal.    Prevent another contusion:  Stretch and warm up before you play sports or exercise.  Wear protective gear when you play sports. Examples are shin guards and padding.  If you begin a new physical activity, start slowly to give your body a chance to adjust.

## 2020-01-02 NOTE — ED PROVIDER NOTE - CLINICAL SUMMARY MEDICAL DECISION MAKING FREE TEXT BOX
25 y/o F s/p fall down stairs last night after drinking alcohol.  She has bruises to thighs and pain to R shoulder, elbow. GCS 15.  Full trauma work up negative.    Patient is a good candidate to attempt outpatient management. Supportive care and home care discussed in detail. Patient aware they may have to return for re-evaluation and possible admission if outpatient treatment fails. Strict return precautions discussed.    Full DC instructions discussed and patient knows when to seek immediate medical attention.  Patient has proper follow up.  All results discussed and patient aware they may require further work up.  Proper follow up ensured. Limitations of ED work up discussed.  Medications administered and prescribed/OTC home meds discussed.  All questions and concerns from patient or family addressed. Understanding of instructions verbalized.

## 2020-01-02 NOTE — ED PROVIDER NOTE - OBJECTIVE STATEMENT
24 y.o. F with no PMH PW fall down stairs, pt noted that she was drinking last night and this morning she fell down 13 stairs, fell backwards, hitting her head, + HT, + LOC, no nausea/vomitting, + epigastric abdominal pain, no visual changes, weakness numbness tingling, Pt ambulating by herself. Pain described as sharp, moderate non radiating, no improved with medication.

## 2020-01-02 NOTE — ED PROVIDER NOTE - NS ED ROS FT
Constitutional:  See HPI.   Eyes:  No visual changes, eye pain or discharge.  ENMT:  No hearing changes, pain, discharge or infections. No neck pain or stiffness.  Cardiac:  No chest pain, SOB or edema. No chest pain with exertion.  Respiratory:  No cough or respiratory distress. No hemoptysis.  GI:  No nausea, vomiting, diarrhea, abdominal pain.  :  No dysuria, frequency, hematuria  MS:  No joint pain or back pain.  Neuro:  No LOC. + headache without weakness.    Skin:  No skin rash.  Except as in HPI, all other review of systems is negative

## 2020-01-02 NOTE — ED PROVIDER NOTE - CARE PLAN
Principal Discharge DX:	Fall (on) (from) unspecified stairs and steps, initial encounter  Secondary Diagnosis:	Contusion of left hand, initial encounter  Secondary Diagnosis:	Arm injury, right, initial encounter

## 2020-01-02 NOTE — ED PROVIDER NOTE - ATTENDING CONTRIBUTION TO CARE
I personally evaluated patient. I agree with the findings and plan with all documentation on chart except as documented  in my note.    23 y/o F s/p fall down stairs last night after drinking alcohol.  She has bruises to thighs and pain to R shoulder, elbow. GCS 15.  Full trauma work up negative.    Patient is a good candidate to attempt outpatient management. Supportive care and home care discussed in detail. Patient aware they may have to return for re-evaluation and possible admission if outpatient treatment fails. Strict return precautions discussed.    Full DC instructions discussed and patient knows when to seek immediate medical attention.  Patient has proper follow up.  All results discussed and patient aware they may require further work up.  Proper follow up ensured. Limitations of ED work up discussed.  Medications administered and prescribed/OTC home meds discussed.  All questions and concerns from patient or family addressed. Understanding of instructions verbalized.

## 2020-02-11 ENCOUNTER — EMERGENCY (EMERGENCY)
Facility: HOSPITAL | Age: 25
LOS: 0 days | Discharge: HOME | End: 2020-02-11
Attending: EMERGENCY MEDICINE | Admitting: EMERGENCY MEDICINE
Payer: COMMERCIAL

## 2020-02-11 VITALS
OXYGEN SATURATION: 100 % | SYSTOLIC BLOOD PRESSURE: 148 MMHG | TEMPERATURE: 99 F | DIASTOLIC BLOOD PRESSURE: 72 MMHG | WEIGHT: 199.96 LBS | RESPIRATION RATE: 16 BRPM | HEART RATE: 107 BPM

## 2020-02-11 VITALS — HEART RATE: 93 BPM

## 2020-02-11 DIAGNOSIS — R07.89 OTHER CHEST PAIN: ICD-10-CM

## 2020-02-11 DIAGNOSIS — R05 COUGH: ICD-10-CM

## 2020-02-11 DIAGNOSIS — Z98.890 OTHER SPECIFIED POSTPROCEDURAL STATES: Chronic | ICD-10-CM

## 2020-02-11 DIAGNOSIS — Z90.49 ACQUIRED ABSENCE OF OTHER SPECIFIED PARTS OF DIGESTIVE TRACT: Chronic | ICD-10-CM

## 2020-02-11 DIAGNOSIS — R53.81 OTHER MALAISE: ICD-10-CM

## 2020-02-11 DIAGNOSIS — J02.9 ACUTE PHARYNGITIS, UNSPECIFIED: ICD-10-CM

## 2020-02-11 LAB — D DIMER BLD IA.RAPID-MCNC: 136 NG/ML DDU — SIGNIFICANT CHANGE UP (ref 0–230)

## 2020-02-11 PROCEDURE — 93010 ELECTROCARDIOGRAM REPORT: CPT

## 2020-02-11 PROCEDURE — 99285 EMERGENCY DEPT VISIT HI MDM: CPT

## 2020-02-11 PROCEDURE — 71046 X-RAY EXAM CHEST 2 VIEWS: CPT | Mod: 26

## 2020-02-11 RX ORDER — IBUPROFEN 200 MG
600 TABLET ORAL ONCE
Refills: 0 | Status: COMPLETED | OUTPATIENT
Start: 2020-02-11 | End: 2020-02-11

## 2020-02-11 RX ADMIN — Medication 600 MILLIGRAM(S): at 22:16

## 2020-02-11 NOTE — ED PROVIDER NOTE - ATTENDING CONTRIBUTION TO CARE
26yo woman on OCPs c/o URI sx with sore throat and productive cough with chest discomfort over the past few days. Worse with cough and deep inspiration. VS, exam as noted, with obvious URI sx. EKG, CXR, ddimer, reassess.

## 2020-02-11 NOTE — ED ADULT NURSE NOTE - OBJECTIVE STATEMENT
Pt reports worsening productive cough, body aches, sore throat, and non-radiating chest pain worst with cough. Denies fever, nausea, vomiting, diarrhea, abd pain, sob, and chills.

## 2020-02-11 NOTE — ED PROVIDER NOTE - PHYSICAL EXAMINATION
CONST: Well appearing in NAD  EYES: PERRL, EOMI, Sclera and conjunctiva clear. Vision 20/20  ENT: No nasal discharge. TM's clear B/L without drainage. mildly erythematous, no exudates. No abscess or swelling. Uvula midline.   NECK: Non-tender, no meningeal signs. normal ROM. supple   CARD: Normal S1 S2; Normal rate and rhythm  RESP: Equal BS B/L, No wheezes, rhonchi or rales. No distress  GI: Soft, non-tender, non-distended. no rebound or guarding.   MS: No chest wall TTP. Normal ROM in all extremities. No midline spinal tenderness. pulses 2 +. no calf tenderness or swelling  SKIN: Warm, dry, no acute rashes.   NEURO: A&Ox3, No focal deficits. Strength 5/5 UE/LE bilaterally with no sensory deficits. Steady gait.

## 2020-02-11 NOTE — ED PROVIDER NOTE - NSFOLLOWUPCLINICS_GEN_ALL_ED_FT
A Family Medicine Doctor  Family Medicine  .  [place]   Phone:   Fax:   Follow Up Time:     [facility] [place]  Cardiology  [address] [place] [address]  Phone: [phone]  Fax:   Follow Up Time:     [facility]  Medicine  [address], [place]   Phone: [phone]  Fax:   Follow Up Time:

## 2020-02-11 NOTE — ED PROVIDER NOTE - NS ED ROS FT
Review of Systems:  	•	CONSTITUTIONAL: no fever, no diaphoresis, no chills  	•	SKIN: no rash  	•	HEMATOLOGIC: no bleeding, no bruising  	•	EYES: no eye pain, no blurry vision  	•	ENT: +sore throat, +nasal congestion, no ear pain   	•	RESPIRATORY: +productive cough, no SOB   	•	CARDIAC: +chest pain, no palpitations   	•	GI: no abd pain, no nausea, no vomiting, no diarrhea  	•	GENITO-URINARY: no discharge, no dysuria; no hematuria, no increased urinary frequency  	•	MUSCULOSKELETAL: no joint paint, no swelling, no redness  	•	NEUROLOGIC: +generalized malaise. no headache, no numbness, no paresthesias   	•	PSYCH: no anxiety, non suicidal, non homicidal, no hallucination, no depression

## 2020-02-11 NOTE — ED PROVIDER NOTE - NSFOLLOWUPINSTRUCTIONS_ED_ALL_ED_FT
Chest Pain    Chest pain can be caused by many different conditions which may or may not be dangerous. Causes include heartburn, lung infections, heart attack, blood clot in lungs, skin infections, strain or damage to muscle, cartilage, or bones, etc. Lab tests or other studies including an electrocardiogram (EKG) may have been performed to find the cause of your pain. Make sure to follow up with a cardiologist or as instructed by your health care professional.    SEEK IMMEDIATE MEDICAL CARE IF YOU HAVE THE FOLLOWING SYMPTOMS: worsening chest pain, coughing up blood, unexplained back/neck/jaw pain, severe abdominal pain, dizziness or lightheadedness, shortness of breath, sweaty or clammy skin, vomiting, or racing heart beat. These symptoms may represent a serious problem that is an emergency. Do not wait to see if the symptoms will go away. Get medical help right away. Call your local emergency services (911 in the [place] Do not drive yourself to the hospital.  Acute Cough    WHAT YOU NEED TO KNOW:    An acute cough can last up to 3 weeks. Common causes of an acute cough include a cold, allergies, or a lung infection.     DISCHARGE INSTRUCTIONS:    Return to the emergency department if:     You have trouble breathing or feel short of breath.      You cough up blood, or you see blood in your mucus.       You faint or feel weak or dizzy.       You have chest pain when you cough or take a deep breath.       You have new wheezing.     Contact your healthcare provider if:     You have a fever.       Your cough lasts longer than 4 weeks.       Your symptoms do not improve with treatment.       You have questions or concerns about your condition or care.     Medicines:     Medicines may be needed to stop the cough, decrease swelling in your airways, or help open your airways. Medicine may also be given to help you cough up mucus. Ask your healthcare provider what over-the-counter medicines you can take. If you have an infection caused by bacteria, you may need antibiotics.       Take your medicine as directed. Contact your healthcare provider if you think your medicine is not helping or if you have side effects. Tell him or her if you are allergic to any medicine. Keep a list of the medicines, vitamins, and herbs you take. Include the amounts, and when and why you take them. Bring the list or the pill bottles to follow-up visits. Carry your medicine list with you in case of an emergency.    Manage your symptoms:     Do not smoke and stay away from others who smoke. Nicotine and other chemicals in cigarettes and cigars can cause lung damage and make your cough worse. Ask your healthcare provider for information if you currently smoke and need help to quit. E-cigarettes or smokeless tobacco still contain nicotine. Talk to your healthcare provider before you use these products.       Drink extra liquids as directed. Liquids will help thin and loosen mucus so you can cough it up. Liquids will also help prevent dehydration. Examples of good liquids to drink include water, fruit juice, and broth. Do not drink liquids that contain caffeine. Caffeine can increase your risk for dehydration. Ask your healthcare provider how much liquid to drink each day.       Rest as directed. Do not do activities that make your cough worse, such as exercise.       Use a humidifier or vaporizer. Use a cool mist humidifier or a vaporizer to increase air moisture in your home. This may make it easier for you to breathe and help decrease your cough.       Eat 2 to 5 mL of honey 2 times each day. Honey can help thin mucus and decrease your cough.       Use cough drops or lozenges. These can help decrease throat irritation and your cough.     Follow up with your healthcare provider as directed: Write down your questions so you remember to ask them during your visits.        © Copyright [facility] 2019 All illustrations and images included in CareNotes are the copyrighted property of [employer] or [employer].

## 2020-02-11 NOTE — ED PROVIDER NOTE - OBJECTIVE STATEMENT
25 year old female, no past medical history, who presents c/o worsening productive cough, generalized malaise, sore throat x5 days. Patient also reports central, non-radiating chest pain that began this afternoon. Patient states pain lasted ~15 minutes and self resolved. Pain worse with cough and inspiration. Of note, patient with history of similar yesterday, stating pain only present with cough. Denies fever, chills, abd pain, n/v/d. No recent travel, recent surgery, hx blood clots, leg pain/swelling, hemoptysis. Patient active smoker, currently on oral birth control pills.

## 2020-02-11 NOTE — ED PROVIDER NOTE - PATIENT PORTAL LINK FT
You can access the FollowMyHealth Patient Portal offered by [facility] by registering at the following website: [email or web address]. By joining [email or web address]’s FollowMyHealth portal, you will also be able to view your health information using other applications (apps) compatible with our system.

## 2020-02-11 NOTE — ED ADULT TRIAGE NOTE - CHIEF COMPLAINT QUOTE
"I think I have the flu and today I developed chest pain while I breathe and cough"  pt reports shortness of breath and having "the birth control implant"

## 2020-07-21 ENCOUNTER — EMERGENCY (EMERGENCY)
Facility: HOSPITAL | Age: 25
LOS: 0 days | Discharge: HOME | End: 2020-07-21
Attending: EMERGENCY MEDICINE | Admitting: EMERGENCY MEDICINE
Payer: COMMERCIAL

## 2020-07-21 VITALS
OXYGEN SATURATION: 98 % | HEART RATE: 62 BPM | SYSTOLIC BLOOD PRESSURE: 111 MMHG | DIASTOLIC BLOOD PRESSURE: 76 MMHG | TEMPERATURE: 99 F | RESPIRATION RATE: 20 BRPM

## 2020-07-21 VITALS
OXYGEN SATURATION: 98 % | RESPIRATION RATE: 20 BRPM | HEART RATE: 78 BPM | DIASTOLIC BLOOD PRESSURE: 84 MMHG | TEMPERATURE: 100 F | SYSTOLIC BLOOD PRESSURE: 122 MMHG

## 2020-07-21 DIAGNOSIS — Z98.890 OTHER SPECIFIED POSTPROCEDURAL STATES: Chronic | ICD-10-CM

## 2020-07-21 DIAGNOSIS — Z90.49 ACQUIRED ABSENCE OF OTHER SPECIFIED PARTS OF DIGESTIVE TRACT: Chronic | ICD-10-CM

## 2020-07-21 DIAGNOSIS — R79.9 ABNORMAL FINDING OF BLOOD CHEMISTRY, UNSPECIFIED: ICD-10-CM

## 2020-07-21 DIAGNOSIS — D64.9 ANEMIA, UNSPECIFIED: ICD-10-CM

## 2020-07-21 LAB
BASOPHILS # BLD AUTO: 0.03 K/UL — SIGNIFICANT CHANGE UP (ref 0–0.2)
BASOPHILS NFR BLD AUTO: 0.4 % — SIGNIFICANT CHANGE UP (ref 0–1)
EOSINOPHIL # BLD AUTO: 0.04 K/UL — SIGNIFICANT CHANGE UP (ref 0–0.7)
EOSINOPHIL NFR BLD AUTO: 0.5 % — SIGNIFICANT CHANGE UP (ref 0–8)
HCT VFR BLD CALC: 35.9 % — LOW (ref 37–47)
HGB BLD-MCNC: 11.3 G/DL — LOW (ref 12–16)
IMM GRANULOCYTES NFR BLD AUTO: 0.2 % — SIGNIFICANT CHANGE UP (ref 0.1–0.3)
LYMPHOCYTES # BLD AUTO: 3 K/UL — SIGNIFICANT CHANGE UP (ref 1.2–3.4)
LYMPHOCYTES # BLD AUTO: 35.5 % — SIGNIFICANT CHANGE UP (ref 20.5–51.1)
MCHC RBC-ENTMCNC: 25.5 PG — LOW (ref 27–31)
MCHC RBC-ENTMCNC: 31.5 G/DL — LOW (ref 32–37)
MCV RBC AUTO: 80.9 FL — LOW (ref 81–99)
MONOCYTES # BLD AUTO: 0.56 K/UL — SIGNIFICANT CHANGE UP (ref 0.1–0.6)
MONOCYTES NFR BLD AUTO: 6.6 % — SIGNIFICANT CHANGE UP (ref 1.7–9.3)
NEUTROPHILS # BLD AUTO: 4.81 K/UL — SIGNIFICANT CHANGE UP (ref 1.4–6.5)
NEUTROPHILS NFR BLD AUTO: 56.8 % — SIGNIFICANT CHANGE UP (ref 42.2–75.2)
NRBC # BLD: 0 /100 WBCS — SIGNIFICANT CHANGE UP (ref 0–0)
PLATELET # BLD AUTO: 54 K/UL — LOW (ref 130–400)
RBC # BLD: 4.44 M/UL — SIGNIFICANT CHANGE UP (ref 4.2–5.4)
RBC # FLD: 15.8 % — HIGH (ref 11.5–14.5)
WBC # BLD: 8.46 K/UL — SIGNIFICANT CHANGE UP (ref 4.8–10.8)
WBC # FLD AUTO: 8.46 K/UL — SIGNIFICANT CHANGE UP (ref 4.8–10.8)

## 2020-07-21 PROCEDURE — 99284 EMERGENCY DEPT VISIT MOD MDM: CPT

## 2020-07-21 NOTE — ED PROVIDER NOTE - PATIENT PORTAL LINK FT
You can access the FollowMyHealth Patient Portal offered by [facility] by registering at the following website: [email or web address]. By joining [employer]’s FollowMyHealth portal, you will also be able to view your health information using other applications (apps) compatible with our system.

## 2020-07-21 NOTE — ED PROVIDER NOTE - NS ED ATTENDING STATEMENT MOD
I have personally performed a face to face diagnostic evaluation on this patient. I have reviewed the ACP note and agree with the history, exam and plan of care, except as noted. I have personally seen and examined this patient.  I have fully participated in the care of this patient. I have reviewed all pertinent clinical information, including history, physical exam, plan and the Medical/PA/NP Student and Resident’s note and agree except as noted.

## 2020-07-21 NOTE — ED ADULT NURSE NOTE - CHIEF COMPLAINT QUOTE
Patient states going for surgical procedure tomorrow and was sent to ED for platelet transfusion prior to. Patient has low platelet count.

## 2020-07-21 NOTE — ED PROVIDER NOTE - CARE PROVIDER_API CALL
Carlos Chawla  HEMATOLOGY  [address] [place] [address]  Phone: [phone]  Fax: [phone]  Follow Up Time:

## 2020-07-21 NOTE — ED PROVIDER NOTE - NS ED ROS FT
Head: No headache, head trauma  Eyes:  No visual changes, eye pain or discharge.  ENMT:  No hearing changes, pain, no sore throat or runny nose, no difficulty swallowing  Cardiac:  No chest pain, SOB or edema. No chest pain with exertion.  Respiratory:  No cough or respiratory distress. No hemoptysis. No history of asthma or RAD.  GI:  No nausea, vomiting, diarrhea or abdominal pain.  :  No dysuria, frequency or burning.  MS:  No myalgia, muscle weakness, joint pain or back pain. (+) Right foot Ganglion Cyst  Neuro:  No headache or weakness.  No LOC.  Skin:  No skin rash.   Endocrine: No history of thyroid disease or diabetes.

## 2020-07-21 NOTE — ED PROVIDER NOTE - ATTENDING CONTRIBUTION TO CARE
24yo woman presents with low platelet count (79) noted on preop labs for ganglin cyst removal which is scheduled for tomorrow. Pt's PMD would not clear for surgery based on labs, so pt came to the ED hoping for clearance. She is otherwise well, denies easy bruising, gum bleeding, heavy periods, chest pain, SOB. Exam unremarkable. Will check CBC, reassess.

## 2020-07-21 NOTE — ED PROVIDER NOTE - OBJECTIVE STATEMENT
26yo female with past medical history of ganglion cyst on her right foot presents with low platelet count on labs. Patient received pre-operative labs and failed to receive clearance for cyst removal from Primary Care Physician after low platelets. Denies fever, recent illness, nvd, headache, shortness of breath, cough, hemoptysis, abdominal pain, constipation, hematuria, dysuria, burning with urine, muscle weakness, leg swelling.

## 2020-07-21 NOTE — ED PROVIDER NOTE - CLINICAL SUMMARY MEDICAL DECISION MAKING FREE TEXT BOX
Platelets 54. Since 2018, pt has visited [facility] sporadically and had labs, with platelets trending down from the mid 200s in 2018, to 100s, and now to 50s. As pt is asymptomatic, will d/c to f/u heme-onc for evaluation of thrombocytopenia. Pt to return to the ED for HA, easy bruising, gum bleeding, rectal or vaginal bleeding, or any other concerns.

## 2020-07-22 ENCOUNTER — OUTPATIENT (OUTPATIENT)
Dept: OUTPATIENT SERVICES | Facility: HOSPITAL | Age: 25
LOS: 1 days | Discharge: HOME | End: 2020-07-22

## 2020-07-22 ENCOUNTER — APPOINTMENT (OUTPATIENT)
Dept: HEMATOLOGY ONCOLOGY | Facility: CLINIC | Age: 25
End: 2020-07-22
Payer: COMMERCIAL

## 2020-07-22 ENCOUNTER — LABORATORY RESULT (OUTPATIENT)
Age: 25
End: 2020-07-22

## 2020-07-22 VITALS
HEIGHT: 65 IN | WEIGHT: 206 LBS | RESPIRATION RATE: 16 BRPM | TEMPERATURE: 99.6 F | BODY MASS INDEX: 34.32 KG/M2 | DIASTOLIC BLOOD PRESSURE: 80 MMHG | SYSTOLIC BLOOD PRESSURE: 123 MMHG | HEART RATE: 66 BPM

## 2020-07-22 DIAGNOSIS — Z90.49 ACQUIRED ABSENCE OF OTHER SPECIFIED PARTS OF DIGESTIVE TRACT: Chronic | ICD-10-CM

## 2020-07-22 DIAGNOSIS — Z98.890 OTHER SPECIFIED POSTPROCEDURAL STATES: Chronic | ICD-10-CM

## 2020-07-22 PROCEDURE — 99205 OFFICE O/P NEW HI 60 MIN: CPT

## 2020-07-22 NOTE — REVIEW OF SYSTEMS
[Negative] : Allergic/Immunologic [Fever] : no fever [Chills] : no chills [Chest Pain] : no chest pain [Shortness Of Breath] : no shortness of breath [Easy Bleeding] : no tendency for easy bleeding [Easy Bruising] : no tendency for easy bruising [FreeTextEntry8] : has Nexplanon inserted in LUE for contraception [FreeTextEntry9] : reports R foot pain due to cyst, worse when walking

## 2020-07-22 NOTE — HISTORY OF PRESENT ILLNESS
[de-identified] : Ms. BISHNU LEVY is a 25 year old female here today for evaluation and management of low PLTs.\par \par Bishnu is a 24yo female with PMHx including but not limited to ganglion cyst on her right foot, who presents to establish care.  She states that she received pre-operative labs in preparation receive clearance for cyst removal from Primary Care Physician after revealing PLT of 79,000 on 7/15/2020.  She states that her anticipated surgical date was today and they postponed it due to her labwork.  She presently denies fever, recent illness, N/V/D, shortness of breath, cough, hemoptysis, abdominal pain, easy bruising or other overt bleeding.  We reviewed her labwork available in our system that show only one other occurrence of thrombocytopenia, which she attributes to having gall bladder infection at that time.  She does not acknowledge ever hearing about lab abnormalities in previous years besides having low iron.  She is here at the request of the podiatric surgeon.  \par \par LAB WORKUP\par (7.21.2020) WBC 8.46, Hgb 11.3, MCV 80.9, PLT 54\par (7.15.2020) WBC 10.5, Hgb 11.6, MCV 80.8, PLT 79\par (5.7.2019) WBC 9.82, Hgb 12.5, MCV 81.2, \par (07.07.2018) WBC 6.65, Hgb 10.3, MCV 83.8,

## 2020-07-22 NOTE — ASSESSMENT
[FreeTextEntry1] : #Thrombocytopenia, new onset; asymptomatic; discovered on pre-operative testing for foot surgery. Etiology is not clear; differential includes ITP, vitamin deficiency, viral cause, collagen vascular diseases\par - labwork today including CBC, BMP, LFTs, Hep B/C, HIV, RAFA, RF, B12/folate, homocysteine, MMA\par - we discussed that low PLTs are not a contraindication for podiatry surgery in an otherwise healthy adult\par - advised to repeat CBC on morning of surgery prior to procedure; he can undergo soft tissue resection with platelets count >50K. today it is 70K. \par -initially arranged for  1 unit SDP tomorrow in anticipation of her upcoming surgery at their request; however, since  platelets count is 70K today , recommend to cancel the platelets transfusion on 7.23.20 and proceed with the surgery; will get labs; and f/u in one month after surgery\par \par

## 2020-07-22 NOTE — REASON FOR VISIT
[Initial Consultation] : an initial consultation for [Blood Count Assessment] : blood count assessment [FreeTextEntry2] : LOW PLTs

## 2020-07-22 NOTE — OB HISTORY
[Normal Amount/Duration] : was of a normal amount and duration [Menarche Age: ____] : age at menarche was [unfilled] [Regular Cycle Intervals] : periods have been regular [___] : Total Pregnancies: [unfilled] [Frequency: Q ___ days] : menstrual periods occur approximately every [unfilled] days [Spotting Between  Menses] : no spotting between menses

## 2020-07-23 ENCOUNTER — APPOINTMENT (OUTPATIENT)
Dept: INFUSION THERAPY | Facility: CLINIC | Age: 25
End: 2020-07-23

## 2020-07-23 LAB
ALBUMIN SERPL ELPH-MCNC: 4.4 G/DL
ALP BLD-CCNC: 69 U/L
ALT SERPL-CCNC: 17 U/L
ANION GAP SERPL CALC-SCNC: 15 MMOL/L
AST SERPL-CCNC: 13 U/L
BILIRUB DIRECT SERPL-MCNC: <0.2 MG/DL
BILIRUB INDIRECT SERPL-MCNC: >0.1 MG/DL
BILIRUB SERPL-MCNC: 0.3 MG/DL
BUN SERPL-MCNC: 12 MG/DL
CALCIUM SERPL-MCNC: 9.3 MG/DL
CHLORIDE SERPL-SCNC: 104 MMOL/L
CO2 SERPL-SCNC: 24 MMOL/L
CREAT SERPL-MCNC: 0.6 MG/DL
GLUCOSE SERPL-MCNC: 61 MG/DL
HCT VFR BLD CALC: 37.7 %
HGB BLD-MCNC: 11.9 G/DL
MCHC RBC-ENTMCNC: 25.3 PG
MCHC RBC-ENTMCNC: 31.6 G/DL
MCV RBC AUTO: 80.2 FL
PLATELET # BLD AUTO: 70 K/UL
PMV BLD: 12.9 FL
POTASSIUM SERPL-SCNC: 3.6 MMOL/L
PROT SERPL-MCNC: 7.2 G/DL
RBC # BLD: 4.7 M/UL
RBC # FLD: 15.6 %
SODIUM SERPL-SCNC: 143 MMOL/L
WBC # FLD AUTO: 10.68 K/UL

## 2020-07-24 LAB
FOLATE SERPL-MCNC: 7.4 NG/ML
HBV CORE IGG+IGM SER QL: NONREACTIVE
HBV SURFACE AB SER QL: REACTIVE
HBV SURFACE AG SER QL: NONREACTIVE
HCYS SERPL-MCNC: 6.9 UMOL/L
RHEUMATOID FACT SER QL: <10 IU/ML
VIT B12 SERPL-MCNC: 418 PG/ML

## 2020-07-28 LAB — ANA SER IF-ACNC: NEGATIVE

## 2020-07-29 LAB — METHYLMALONATE SERPL-SCNC: 109 NMOL/L

## 2020-07-30 ENCOUNTER — TRANSCRIPTION ENCOUNTER (OUTPATIENT)
Age: 25
End: 2020-07-30

## 2020-07-31 LAB
HCV RNA SERPL NAA DL=5-ACNC: NOT DETECTED IU/ML
HCV RNA SERPL NAA+PROBE-LOG IU: NOT DETECTED LOG10IU/ML

## 2020-08-06 DIAGNOSIS — D69.6 THROMBOCYTOPENIA, UNSPECIFIED: ICD-10-CM

## 2020-08-26 ENCOUNTER — APPOINTMENT (OUTPATIENT)
Dept: HEMATOLOGY ONCOLOGY | Facility: CLINIC | Age: 25
End: 2020-08-26
Payer: COMMERCIAL

## 2020-08-26 PROCEDURE — 99213 OFFICE O/P EST LOW 20 MIN: CPT | Mod: 95

## 2020-08-26 NOTE — ASSESSMENT
[FreeTextEntry1] : #Thrombocytopenia, new onset; asymptomatic; discovered on pre-operative testing for foot surgery. Etiology is not clear; w/u negative; differential includes ITP, viral disease, medications\par \par labs and f/u next week

## 2020-08-26 NOTE — REASON FOR VISIT
[Follow-Up Visit] : a follow-up visit for [Blood Count Assessment] : blood count assessment [Home] : at home, [unfilled] , at the time of the visit. [Medical Office: ([facility])___] : at the medical office located in  [Verbal consent obtained from patient] : the patient, [unfilled] [FreeTextEntry2] : LOW PLTs

## 2020-08-26 NOTE — OB HISTORY
[Normal Amount/Duration] : was of a normal amount and duration [Regular Cycle Intervals] : periods have been regular [Frequency: Q ___ days] : menstrual periods occur approximately every [unfilled] days [Menarche Age: ____] : age at menarche was [unfilled] [___] : Living: [unfilled] [Spotting Between  Menses] : no spotting between menses

## 2020-08-26 NOTE — HISTORY OF PRESENT ILLNESS
[de-identified] : Ms. BISHNU LEVY is a 25 year old female here today for evaluation and management of low PLTs.\par \par Bishnu is a 24yo female with PMHx including but not limited to ganglion cyst on her right foot, who presents to establish care.  She states that she received pre-operative labs in preparation receive clearance for cyst removal from Primary Care Physician after revealing PLT of 79,000 on 7/15/2020.  She states that her anticipated surgical date was today and they postponed it due to her labwork.  She presently denies fever, recent illness, N/V/D, shortness of breath, cough, hemoptysis, abdominal pain, easy bruising or other overt bleeding.  We reviewed her labwork available in our system that show only one other occurrence of thrombocytopenia, which she attributes to having gall bladder infection at that time.  She does not acknowledge ever hearing about lab abnormalities in previous years besides having low iron.  She is here at the request of the podiatric surgeon.  \par \par LAB WORKUP\par (7.21.2020) WBC 8.46, Hgb 11.3, MCV 80.9, PLT 54\par (7.15.2020) WBC 10.5, Hgb 11.6, MCV 80.8, PLT 79\par (5.7.2019) WBC 9.82, Hgb 12.5, MCV 81.2, \par (07.07.2018) WBC 6.65, Hgb 10.3, MCV 83.8,

## 2020-08-31 ENCOUNTER — APPOINTMENT (OUTPATIENT)
Dept: HEMATOLOGY ONCOLOGY | Facility: CLINIC | Age: 25
End: 2020-08-31

## 2020-09-02 ENCOUNTER — APPOINTMENT (OUTPATIENT)
Dept: HEMATOLOGY ONCOLOGY | Facility: CLINIC | Age: 25
End: 2020-09-02
Payer: COMMERCIAL

## 2020-09-02 PROCEDURE — 99213 OFFICE O/P EST LOW 20 MIN: CPT | Mod: 95

## 2020-09-02 NOTE — REVIEW OF SYSTEMS
[Negative] : Psychiatric [Fever] : no fever [Chills] : no chills [Chest Pain] : no chest pain [Easy Bleeding] : no tendency for easy bleeding [Shortness Of Breath] : no shortness of breath [FreeTextEntry8] : has Nexplanon inserted in LUE for contraception [FreeTextEntry9] : reports R foot pain due to cyst, worse when walking [Easy Bruising] : no tendency for easy bruising

## 2020-09-02 NOTE — HISTORY OF PRESENT ILLNESS
[de-identified] : Ms. BISHNU LEVY is a 25 year old female here today for evaluation and management of low PLTs.\par \par Bishnu is a 24yo female with PMHx including but not limited to ganglion cyst on her right foot, who presents to establish care.  She states that she received pre-operative labs in preparation receive clearance for cyst removal from Primary Care Physician after revealing PLT of 79,000 on 7/15/2020.  She states that her anticipated surgical date was today and they postponed it due to her labwork.  She presently denies fever, recent illness, N/V/D, shortness of breath, cough, hemoptysis, abdominal pain, easy bruising or other overt bleeding.  We reviewed her labwork available in our system that show only one other occurrence of thrombocytopenia, which she attributes to having gall bladder infection at that time.  She does not acknowledge ever hearing about lab abnormalities in previous years besides having low iron.  She is here at the request of the podiatric surgeon.  \par \par LAB WORKUP\par (7.21.2020) WBC 8.46, Hgb 11.3, MCV 80.9, PLT 54\par (7.15.2020) WBC 10.5, Hgb 11.6, MCV 80.8, PLT 79\par (5.7.2019) WBC 9.82, Hgb 12.5, MCV 81.2, \par (07.07.2018) WBC 6.65, Hgb 10.3, MCV 83.8,

## 2020-09-02 NOTE — ASSESSMENT
[FreeTextEntry1] : #Thrombocytopenia, new onset; asymptomatic; discovered on pre-operative testing for foot surgery. Etiology is not clear; w/u negative; differential includes ITP, viral disease, medications\par \par labs reviewed; no obvious explanation: no new meds; no recent viral infection and no improvement of plats count since May 2020. Strongly consider ITP (idiopathic); no indications to start treatment; will repeat labs in 2 mos

## 2020-09-02 NOTE — REASON FOR VISIT
[Blood Count Assessment] : blood count assessment [Follow-Up Visit] : a follow-up visit for [Medical Office: ([facility])___] : at the medical office located in  [Home] : at home, [unfilled] , at the time of the visit. [Verbal consent obtained from patient] : the patient, [unfilled] [FreeTextEntry2] : LOW PLTs

## 2020-09-04 ENCOUNTER — APPOINTMENT (OUTPATIENT)
Dept: HEMATOLOGY ONCOLOGY | Facility: CLINIC | Age: 25
End: 2020-09-04

## 2020-11-04 ENCOUNTER — APPOINTMENT (OUTPATIENT)
Dept: HEMATOLOGY ONCOLOGY | Facility: CLINIC | Age: 25
End: 2020-11-04

## 2020-11-09 NOTE — ED PROVIDER NOTE - PRO INTERPRETER NEED 2
As noted below, the patient's wife was given instructions for Lovenox bridging.   Pt is to HOLD his Eliquis Dec 12 and 13th, then start taking Lovenox daily 24 hours after his surgery. RX was sent to [facility] in [place] today, confirmed dosing with Molly for 150 mg SQ daily. Pt's surgeon is to determine when he can restart his Eliquis after his surgery per NP. Wife was agreeable with plan.    English

## 2021-01-11 ENCOUNTER — APPOINTMENT (OUTPATIENT)
Dept: HEMATOLOGY ONCOLOGY | Facility: CLINIC | Age: 26
End: 2021-01-11

## 2021-01-11 NOTE — ED ADULT TRIAGE NOTE - ESI TRIAGE ACUITY LEVEL, MLM
3 Alert-The patient is alert, awake and responds to voice. The patient is oriented to time, place, and person. The triage nurse is able to obtain subjective information.

## 2021-01-13 ENCOUNTER — APPOINTMENT (OUTPATIENT)
Dept: HEMATOLOGY ONCOLOGY | Facility: CLINIC | Age: 26
End: 2021-01-13

## 2021-01-18 ENCOUNTER — APPOINTMENT (OUTPATIENT)
Dept: HEMATOLOGY ONCOLOGY | Facility: CLINIC | Age: 26
End: 2021-01-18

## 2021-01-20 ENCOUNTER — LABORATORY RESULT (OUTPATIENT)
Age: 26
End: 2021-01-20

## 2021-01-20 ENCOUNTER — APPOINTMENT (OUTPATIENT)
Dept: HEMATOLOGY ONCOLOGY | Facility: CLINIC | Age: 26
End: 2021-01-20
Payer: COMMERCIAL

## 2021-01-20 ENCOUNTER — OUTPATIENT (OUTPATIENT)
Dept: OUTPATIENT SERVICES | Facility: HOSPITAL | Age: 26
LOS: 1 days | Discharge: HOME | End: 2021-01-20

## 2021-01-20 VITALS
HEART RATE: 73 BPM | DIASTOLIC BLOOD PRESSURE: 72 MMHG | RESPIRATION RATE: 16 BRPM | WEIGHT: 220 LBS | TEMPERATURE: 99.4 F | SYSTOLIC BLOOD PRESSURE: 121 MMHG | HEIGHT: 65 IN | BODY MASS INDEX: 36.65 KG/M2

## 2021-01-20 DIAGNOSIS — Z00.00 ENCOUNTER FOR GENERAL ADULT MEDICAL EXAMINATION W/OUT ABNORMAL FINDINGS: ICD-10-CM

## 2021-01-20 DIAGNOSIS — Z98.890 OTHER SPECIFIED POSTPROCEDURAL STATES: Chronic | ICD-10-CM

## 2021-01-20 DIAGNOSIS — Z90.49 ACQUIRED ABSENCE OF OTHER SPECIFIED PARTS OF DIGESTIVE TRACT: Chronic | ICD-10-CM

## 2021-01-20 PROCEDURE — 99212 OFFICE O/P EST SF 10 MIN: CPT

## 2021-01-20 NOTE — HISTORY OF PRESENT ILLNESS
[de-identified] : 1/20/21\par Patient is here for a follow-up visit for thrombocytopenia.  Most recent CBC shows slight microcytosis without anemia and persistent thrombocytopenia at 88,000.  She states that her LMP on 1/1/21 was longer in duration and heavier than normal.  She also notes an abnormally large bruise on her lower extremity over recent weeks which resolved.  She states she was treated with prednisone burst for 5 days to treat recent sinus infection.  She states back in Sept 2020 her PLTs were around 50,000.   [de-identified] : Ms. BISHNU LEVY is a 25 year old female here today for evaluation and management of low PLTs.\par \par Bishnu is a 26yo female with PMHx including but not limited to ganglion cyst on her right foot, who presents to establish care.  She states that she received pre-operative labs in preparation receive clearance for cyst removal from Primary Care Physician after revealing PLT of 79,000 on 7/15/2020.  She states that her anticipated surgical date was today and they postponed it due to her labwork.  She presently denies fever, recent illness, N/V/D, shortness of breath, cough, hemoptysis, abdominal pain, easy bruising or other overt bleeding.  We reviewed her labwork available in our system that show only one other occurrence of thrombocytopenia, which she attributes to having gall bladder infection at that time.  She does not acknowledge ever hearing about lab abnormalities in previous years besides having low iron.  She is here at the request of the podiatric surgeon.  \par \par LAB WORKUP\par (7.21.2020) WBC 8.46, Hgb 11.3, MCV 80.9, PLT 54\par (7.15.2020) WBC 10.5, Hgb 11.6, MCV 80.8, PLT 79\par (5.7.2019) WBC 9.82, Hgb 12.5, MCV 81.2, \par (07.07.2018) WBC 6.65, Hgb 10.3, MCV 83.8,

## 2021-01-20 NOTE — REASON FOR VISIT
[Follow-Up Visit] : a follow-up visit for [Blood Count Assessment] : blood count assessment [FreeTextEntry2] : LOW PLTs

## 2021-01-20 NOTE — ASSESSMENT
[FreeTextEntry1] : #Thrombocytopenia, new onset; asymptomatic; discovered on pre-operative testing for foot surgery. Etiology is not clear; w/u negative; differential includes ITP, viral disease, medications\par \par - labs reviewed; no obvious explanation: no new meds; no recent viral infection and no improvement of plats count since May 2020. 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

## 2021-01-21 LAB
HCT VFR BLD CALC: 38.3 %
HGB BLD-MCNC: 12.1 G/DL
INR PPP: 0.99 RATIO
MCHC RBC-ENTMCNC: 24.5 PG
MCHC RBC-ENTMCNC: 31.6 G/DL
MCV RBC AUTO: 77.5 FL
PLATELET # BLD AUTO: 88 K/UL
PMV BLD: 12.2 FL
PT BLD: 11.4 SEC
RBC # BLD: 4.94 M/UL
RBC # FLD: 16.8 %
WBC # FLD AUTO: 10.23 K/UL

## 2021-01-26 DIAGNOSIS — D69.6 THROMBOCYTOPENIA, UNSPECIFIED: ICD-10-CM

## 2021-02-25 ENCOUNTER — EMERGENCY (EMERGENCY)
Facility: HOSPITAL | Age: 26
LOS: 0 days | Discharge: HOME | End: 2021-02-25
Attending: STUDENT IN AN ORGANIZED HEALTH CARE EDUCATION/TRAINING PROGRAM | Admitting: STUDENT IN AN ORGANIZED HEALTH CARE EDUCATION/TRAINING PROGRAM
Payer: COMMERCIAL

## 2021-02-25 VITALS — HEART RATE: 90 BPM

## 2021-02-25 VITALS
HEART RATE: 102 BPM | WEIGHT: 220.02 LBS | HEIGHT: 64 IN | DIASTOLIC BLOOD PRESSURE: 88 MMHG | SYSTOLIC BLOOD PRESSURE: 146 MMHG | TEMPERATURE: 97 F | RESPIRATION RATE: 18 BRPM | OXYGEN SATURATION: 99 %

## 2021-02-25 DIAGNOSIS — Z90.49 ACQUIRED ABSENCE OF OTHER SPECIFIED PARTS OF DIGESTIVE TRACT: Chronic | ICD-10-CM

## 2021-02-25 DIAGNOSIS — Z90.49 ACQUIRED ABSENCE OF OTHER SPECIFIED PARTS OF DIGESTIVE TRACT: ICD-10-CM

## 2021-02-25 DIAGNOSIS — R10.9 UNSPECIFIED ABDOMINAL PAIN: ICD-10-CM

## 2021-02-25 DIAGNOSIS — R10.13 EPIGASTRIC PAIN: ICD-10-CM

## 2021-02-25 DIAGNOSIS — Z98.890 OTHER SPECIFIED POSTPROCEDURAL STATES: Chronic | ICD-10-CM

## 2021-02-25 DIAGNOSIS — R10.11 RIGHT UPPER QUADRANT PAIN: ICD-10-CM

## 2021-02-25 LAB
ALBUMIN SERPL ELPH-MCNC: 4.3 G/DL — SIGNIFICANT CHANGE UP (ref 3.5–5.2)
ALP SERPL-CCNC: 97 U/L — SIGNIFICANT CHANGE UP (ref 30–115)
ALT FLD-CCNC: 24 U/L — SIGNIFICANT CHANGE UP (ref 0–41)
ANION GAP SERPL CALC-SCNC: 9 MMOL/L — SIGNIFICANT CHANGE UP (ref 7–14)
APPEARANCE UR: CLEAR — SIGNIFICANT CHANGE UP
AST SERPL-CCNC: 17 U/L — SIGNIFICANT CHANGE UP (ref 0–41)
BASOPHILS # BLD AUTO: 0.05 K/UL — SIGNIFICANT CHANGE UP (ref 0–0.2)
BASOPHILS NFR BLD AUTO: 0.5 % — SIGNIFICANT CHANGE UP (ref 0–1)
BILIRUB SERPL-MCNC: <0.2 MG/DL — SIGNIFICANT CHANGE UP (ref 0.2–1.2)
BILIRUB UR-MCNC: NEGATIVE — SIGNIFICANT CHANGE UP
BUN SERPL-MCNC: 18 MG/DL — SIGNIFICANT CHANGE UP (ref 10–20)
CALCIUM SERPL-MCNC: 8.9 MG/DL — SIGNIFICANT CHANGE UP (ref 8.5–10.1)
CHLORIDE SERPL-SCNC: 107 MMOL/L — SIGNIFICANT CHANGE UP (ref 98–110)
CO2 SERPL-SCNC: 28 MMOL/L — SIGNIFICANT CHANGE UP (ref 17–32)
COLOR SPEC: SIGNIFICANT CHANGE UP
CREAT SERPL-MCNC: 0.6 MG/DL — LOW (ref 0.7–1.5)
DIFF PNL FLD: NEGATIVE — SIGNIFICANT CHANGE UP
EOSINOPHIL # BLD AUTO: 0.06 K/UL — SIGNIFICANT CHANGE UP (ref 0–0.7)
EOSINOPHIL NFR BLD AUTO: 0.6 % — SIGNIFICANT CHANGE UP (ref 0–8)
GLUCOSE SERPL-MCNC: 110 MG/DL — HIGH (ref 70–99)
GLUCOSE UR QL: NEGATIVE — SIGNIFICANT CHANGE UP
HCT VFR BLD CALC: 38.8 % — SIGNIFICANT CHANGE UP (ref 37–47)
HGB BLD-MCNC: 12.2 G/DL — SIGNIFICANT CHANGE UP (ref 12–16)
IMM GRANULOCYTES NFR BLD AUTO: 0.6 % — HIGH (ref 0.1–0.3)
KETONES UR-MCNC: NEGATIVE — SIGNIFICANT CHANGE UP
LEUKOCYTE ESTERASE UR-ACNC: NEGATIVE — SIGNIFICANT CHANGE UP
LIDOCAIN IGE QN: 41 U/L — SIGNIFICANT CHANGE UP (ref 7–60)
LYMPHOCYTES # BLD AUTO: 2.45 K/UL — SIGNIFICANT CHANGE UP (ref 1.2–3.4)
LYMPHOCYTES # BLD AUTO: 24.2 % — SIGNIFICANT CHANGE UP (ref 20.5–51.1)
MCHC RBC-ENTMCNC: 24.8 PG — LOW (ref 27–31)
MCHC RBC-ENTMCNC: 31.4 G/DL — LOW (ref 32–37)
MCV RBC AUTO: 78.9 FL — LOW (ref 81–99)
MONOCYTES # BLD AUTO: 0.59 K/UL — SIGNIFICANT CHANGE UP (ref 0.1–0.6)
MONOCYTES NFR BLD AUTO: 5.8 % — SIGNIFICANT CHANGE UP (ref 1.7–9.3)
NEUTROPHILS # BLD AUTO: 6.93 K/UL — HIGH (ref 1.4–6.5)
NEUTROPHILS NFR BLD AUTO: 68.3 % — SIGNIFICANT CHANGE UP (ref 42.2–75.2)
NITRITE UR-MCNC: NEGATIVE — SIGNIFICANT CHANGE UP
NRBC # BLD: 0 /100 WBCS — SIGNIFICANT CHANGE UP (ref 0–0)
PH UR: 7.5 — SIGNIFICANT CHANGE UP (ref 5–8)
PLATELET # BLD AUTO: 80 K/UL — LOW (ref 130–400)
POTASSIUM SERPL-MCNC: 4.3 MMOL/L — SIGNIFICANT CHANGE UP (ref 3.5–5)
POTASSIUM SERPL-SCNC: 4.3 MMOL/L — SIGNIFICANT CHANGE UP (ref 3.5–5)
PROT SERPL-MCNC: 7.2 G/DL — SIGNIFICANT CHANGE UP (ref 6–8)
PROT UR-MCNC: NEGATIVE — SIGNIFICANT CHANGE UP
RBC # BLD: 4.92 M/UL — SIGNIFICANT CHANGE UP (ref 4.2–5.4)
RBC # FLD: 16.4 % — HIGH (ref 11.5–14.5)
SODIUM SERPL-SCNC: 144 MMOL/L — SIGNIFICANT CHANGE UP (ref 135–146)
SP GR SPEC: 1.02 — SIGNIFICANT CHANGE UP (ref 1.01–1.03)
UROBILINOGEN FLD QL: SIGNIFICANT CHANGE UP
WBC # BLD: 10.14 K/UL — SIGNIFICANT CHANGE UP (ref 4.8–10.8)
WBC # FLD AUTO: 10.14 K/UL — SIGNIFICANT CHANGE UP (ref 4.8–10.8)

## 2021-02-25 PROCEDURE — 99285 EMERGENCY DEPT VISIT HI MDM: CPT

## 2021-02-25 PROCEDURE — 76705 ECHO EXAM OF ABDOMEN: CPT | Mod: 26

## 2021-02-25 RX ORDER — SODIUM CHLORIDE 9 MG/ML
1000 INJECTION, SOLUTION INTRAVENOUS ONCE
Refills: 0 | Status: COMPLETED | OUTPATIENT
Start: 2021-02-25 | End: 2021-02-25

## 2021-02-25 RX ORDER — SUCRALFATE 1 G
1 TABLET ORAL
Qty: 5 | Refills: 0
Start: 2021-02-25 | End: 2021-03-01

## 2021-02-25 RX ORDER — FAMOTIDINE 10 MG/ML
1 INJECTION INTRAVENOUS
Qty: 5 | Refills: 0
Start: 2021-02-25 | End: 2021-03-01

## 2021-02-25 RX ORDER — FAMOTIDINE 10 MG/ML
20 INJECTION INTRAVENOUS ONCE
Refills: 0 | Status: COMPLETED | OUTPATIENT
Start: 2021-02-25 | End: 2021-02-25

## 2021-02-25 RX ORDER — DIPHENHYDRAMINE HYDROCHLORIDE AND LIDOCAINE HYDROCHLORIDE AND ALUMINUM HYDROXIDE AND MAGNESIUM HYDRO
30 KIT ONCE
Refills: 0 | Status: COMPLETED | OUTPATIENT
Start: 2021-02-25 | End: 2021-02-25

## 2021-02-25 RX ORDER — ONDANSETRON 8 MG/1
4 TABLET, FILM COATED ORAL ONCE
Refills: 0 | Status: COMPLETED | OUTPATIENT
Start: 2021-02-25 | End: 2021-02-25

## 2021-02-25 RX ADMIN — ONDANSETRON 4 MILLIGRAM(S): 8 TABLET, FILM COATED ORAL at 08:32

## 2021-02-25 RX ADMIN — SODIUM CHLORIDE 1000 MILLILITER(S): 9 INJECTION, SOLUTION INTRAVENOUS at 08:33

## 2021-02-25 RX ADMIN — FAMOTIDINE 20 MILLIGRAM(S): 10 INJECTION INTRAVENOUS at 08:32

## 2021-02-25 RX ADMIN — DIPHENHYDRAMINE HYDROCHLORIDE AND LIDOCAINE HYDROCHLORIDE AND ALUMINUM HYDROXIDE AND MAGNESIUM HYDRO 30 MILLILITER(S): KIT at 08:32

## 2021-02-25 NOTE — ED PROVIDER NOTE - PATIENT PORTAL LINK FT
You can access the FollowMyHealth Patient Portal offered by [facility] by registering at the following website: [email or web address]. By joining [employer]’s FollowMyHealth portal, you will also be able to view your health information using other applications (apps) compatible with our system.

## 2021-02-25 NOTE — ED ADULT TRIAGE NOTE - CHIEF COMPLAINT QUOTE
"I had my gb removed 7/18, I didn't change my diet after and corie been having abdominal issues after"

## 2021-02-25 NOTE — ED PROVIDER NOTE - OBJECTIVE STATEMENT
27 yo female, PMHx of platelet disorder, presents with 6 months of epigastric and RUQ pain, radiating to her right flank, intermittent, associated with nausea and vomiting, worse since drinking last night. Denies chest pain, SOB, diarrhea, weight loss. 27 yo female, PMHx of platelet disorder, presents with 6 months of epigastric and RUQ pain, radiating to her right flank, intermittent, associated with nausea and vomiting, worse since eating at a BBQ yesterday. She woke up around 2 am with nausea and vomiting. She states she has been meaning to get an abdominal ultrasound for the last month, as recommended by her hematologist, but she does not have a GI doctor to follow up with. Denies chest pain, SOB, diarrhea, weight loss, urinary symptoms.

## 2021-02-25 NOTE — ED PROVIDER NOTE - NSFOLLOWUPINSTRUCTIONS_ED_ALL_ED_FT
Abdominal Pain    WHAT YOU NEED TO KNOW:    Abdominal pain can be dull, achy, or sharp. You may have pain in one area of your abdomen, or in your entire abdomen. Your pain may be caused by a condition such as constipation, food sensitivity or poisoning, infection, or a blockage. Abdominal pain can also be from a hernia, appendicitis, or an ulcer. Liver, gallbladder, or kidney conditions can also cause abdominal pain. The cause of your abdominal pain may be unknown.     DISCHARGE INSTRUCTIONS:    Return to the emergency department if:   •You have new chest pain or shortness of breath.      •You have pulsing pain in your upper abdomen or lower back that suddenly becomes constant.      •Your pain is in the right lower abdominal area and worsens with movement.       •You have a fever over 100.4°F (38°C) or shaking chills.       •You are vomiting and cannot keep food or liquids down.       •Your pain does not improve or gets worse over the next 8 to 12 hours.       •You see blood in your vomit or bowel movements, or they look black and tarry.       •Your skin or the whites of your eyes turn yellow.       •You are a woman and have a large amount of vaginal bleeding that is not your monthly period.       Contact your healthcare provider if:   •You have pain in your lower back.       •You are a man and have pain in your testicles.      •You have pain when you urinate.       •You have questions or concerns about your condition or care.      Follow up with your healthcare provider within 24 hours or as directed: Write down your questions so you remember to ask them during your visits.     Medicines:   •Medicines may be given to calm your stomach and prevent vomiting or to decrease pain. Ask how to take pain medicine safely.      •Take your medicine as directed. Contact your healthcare provider if you think your medicine is not helping or if you have side effects. Tell him of her if you are allergic to any medicine. Keep a list of the medicines, vitamins, and herbs you take. Include the amounts, and when and why you take them. Bring the list or the pill bottles to follow-up visits. Carry your medicine list with you in case of an emergency.

## 2021-02-25 NOTE — ED PROVIDER NOTE - ATTENDING CONTRIBUTION TO CARE
25 yo f hx plt disorder?, iron def anemia per chart  pt presents w/ abdominal pain x 6 months. pt states pain chronic epigastric/RUQ pain. pt states she went out last night and had BBQ and etoh. pt states she had n/v/d when returning home. pt states she developed epigastric pain and has woken up several times to vomit.  no urinary complaints. pt does not have GI doctor. pt states another friend had vomiting and diarrhea.       vss  gen- NAD, aaox3  card-rrr  lungs-ctab, no wheezing or rhonchi  abd-soft, mild epigastric ttp, no guarding or rebound  neuro- full str/sensation, cn ii-xii grossly intact, normal coordination and gait    c/f AGE vs gastritis, less likely HB as that pain is chronic

## 2021-02-25 NOTE — ED PROVIDER NOTE - NS ED ROS FT
GEN:  no fever, no chills, no generalized weakness  NEURO:  no headache, no dizziness  ENT: no sore throat, no runny nose  CV:  no chest pain, no palpitations  RESP:  no sob, no cough  GI:  +nausea, +vomiting, +abdominal pain, +diarrhea  :  no dysuria, no urinary frequency, no hematuria  MSK:  no joint pain, no edema  SKIN:  no rash, no bruising  HEME: no hematochezia, no melena

## 2021-02-25 NOTE — ED PROVIDER NOTE - CARE PROVIDER_API CALL
"[facility]    Chief Complaint   Patient presents with     retirement Acute       [facility] Record Number:  [id number]  Place of Service where encounter took place:  [facility]  (S) [[id number]]    HPI:    Edison Boss is a 89 year old  (10/19/1926), who is being seen today for an episodic care visit.  HPI information obtained from: facility chart records, facility staff, patient report and [facility] chart review.     Today's concern is:  1. Hand edema    staff report patient with ongoing edema and some minimal redness and bruising top of left hand. Has been present since admission. No known injury. Some pain and some warmth per patient. No fevers.   Lab Results   Component Value Date    WBC 9.3 10/04/2018    WBC 10.3 10/03/2018   Recent BP 10/04-10/15: 109-187/60-78 mmHg    REVIEW OF SYSTEMS:  4 point ROS including Respiratory, CV, GI and , other than that noted in the HPI,  is negative    OBJECTIVE:   /65  Pulse 64  Temp 98.7  F (37.1  C)  Resp 17  Ht 5' 10\" (1.778 m)  Wt 155 lb (70.3 kg)  SpO2 94%  BMI 22.24 kg/m2  GENERAL APPEARANCE:  Alert, in no distress  On room air, pleasant  Left hand: increased warmth compared to right hand. Top of hand with redness and some edema around base of thumb and knuckles with some bruising noted.     ASSESSMENT/PLAN:  Hand edema  Ongoing issue since admission per staff. No injury noted or reported.     Check CBC with diff, BMP, uric acid and sed rate and f/u with results. Monitor for worsened redness. Treat for pain as needed.     Electronically signed by:  JYOTI Salazar CNP  " [facility]  [facility]STRO[facility]OLOG[facility]  [address] [place] [id number]  Phone: [phone]  Fax: [phone]  Follow Up Time: 1-3 Days

## 2021-03-22 ENCOUNTER — APPOINTMENT (OUTPATIENT)
Dept: HEMATOLOGY ONCOLOGY | Facility: CLINIC | Age: 26
End: 2021-03-22

## 2021-03-24 ENCOUNTER — APPOINTMENT (OUTPATIENT)
Dept: HEMATOLOGY ONCOLOGY | Facility: CLINIC | Age: 26
End: 2021-03-24

## 2021-06-12 ENCOUNTER — EMERGENCY (EMERGENCY)
Facility: HOSPITAL | Age: 26
LOS: 0 days | Discharge: HOME | End: 2021-06-12
Attending: EMERGENCY MEDICINE | Admitting: EMERGENCY MEDICINE
Payer: COMMERCIAL

## 2021-06-12 VITALS
DIASTOLIC BLOOD PRESSURE: 76 MMHG | SYSTOLIC BLOOD PRESSURE: 123 MMHG | RESPIRATION RATE: 18 BRPM | OXYGEN SATURATION: 100 % | HEART RATE: 86 BPM

## 2021-06-12 VITALS
HEIGHT: 64 IN | DIASTOLIC BLOOD PRESSURE: 105 MMHG | SYSTOLIC BLOOD PRESSURE: 150 MMHG | TEMPERATURE: 99 F | RESPIRATION RATE: 18 BRPM | HEART RATE: 115 BPM | OXYGEN SATURATION: 100 % | WEIGHT: 220.02 LBS

## 2021-06-12 DIAGNOSIS — R19.7 DIARRHEA, UNSPECIFIED: ICD-10-CM

## 2021-06-12 DIAGNOSIS — R50.9 FEVER, UNSPECIFIED: ICD-10-CM

## 2021-06-12 DIAGNOSIS — R10.9 UNSPECIFIED ABDOMINAL PAIN: ICD-10-CM

## 2021-06-12 DIAGNOSIS — Z98.890 OTHER SPECIFIED POSTPROCEDURAL STATES: Chronic | ICD-10-CM

## 2021-06-12 DIAGNOSIS — R10.11 RIGHT UPPER QUADRANT PAIN: ICD-10-CM

## 2021-06-12 DIAGNOSIS — Z86.2 PERSONAL HISTORY OF DISEASES OF THE BLOOD AND BLOOD-FORMING ORGANS AND CERTAIN DISORDERS INVOLVING THE IMMUNE MECHANISM: ICD-10-CM

## 2021-06-12 DIAGNOSIS — Z90.49 ACQUIRED ABSENCE OF OTHER SPECIFIED PARTS OF DIGESTIVE TRACT: ICD-10-CM

## 2021-06-12 DIAGNOSIS — R42 DIZZINESS AND GIDDINESS: ICD-10-CM

## 2021-06-12 DIAGNOSIS — Z90.49 ACQUIRED ABSENCE OF OTHER SPECIFIED PARTS OF DIGESTIVE TRACT: Chronic | ICD-10-CM

## 2021-06-12 LAB
ALBUMIN SERPL ELPH-MCNC: 4.7 G/DL — SIGNIFICANT CHANGE UP (ref 3.5–5.2)
ALP SERPL-CCNC: 122 U/L — HIGH (ref 30–115)
ALT FLD-CCNC: 26 U/L — SIGNIFICANT CHANGE UP (ref 0–41)
ANION GAP SERPL CALC-SCNC: 11 MMOL/L — SIGNIFICANT CHANGE UP (ref 7–14)
APPEARANCE UR: CLEAR — SIGNIFICANT CHANGE UP
AST SERPL-CCNC: 26 U/L — SIGNIFICANT CHANGE UP (ref 0–41)
BASOPHILS # BLD AUTO: 0.03 K/UL — SIGNIFICANT CHANGE UP (ref 0–0.2)
BASOPHILS NFR BLD AUTO: 0.2 % — SIGNIFICANT CHANGE UP (ref 0–1)
BILIRUB DIRECT SERPL-MCNC: <0.2 MG/DL — SIGNIFICANT CHANGE UP (ref 0–0.2)
BILIRUB INDIRECT FLD-MCNC: >0 MG/DL — LOW (ref 0.2–1.2)
BILIRUB SERPL-MCNC: 0.2 MG/DL — SIGNIFICANT CHANGE UP (ref 0.2–1.2)
BILIRUB UR-MCNC: NEGATIVE — SIGNIFICANT CHANGE UP
BUN SERPL-MCNC: 8 MG/DL — LOW (ref 10–20)
CALCIUM SERPL-MCNC: 9.5 MG/DL — SIGNIFICANT CHANGE UP (ref 8.5–10.1)
CHLORIDE SERPL-SCNC: 102 MMOL/L — SIGNIFICANT CHANGE UP (ref 98–110)
CO2 SERPL-SCNC: 24 MMOL/L — SIGNIFICANT CHANGE UP (ref 17–32)
COLOR SPEC: SIGNIFICANT CHANGE UP
CREAT SERPL-MCNC: 0.6 MG/DL — LOW (ref 0.7–1.5)
DIFF PNL FLD: SIGNIFICANT CHANGE UP
EOSINOPHIL # BLD AUTO: 0 K/UL — SIGNIFICANT CHANGE UP (ref 0–0.7)
EOSINOPHIL NFR BLD AUTO: 0 % — SIGNIFICANT CHANGE UP (ref 0–8)
GLUCOSE SERPL-MCNC: 118 MG/DL — HIGH (ref 70–99)
GLUCOSE UR QL: NEGATIVE — SIGNIFICANT CHANGE UP
HCT VFR BLD CALC: 40.6 % — SIGNIFICANT CHANGE UP (ref 37–47)
HGB BLD-MCNC: 12.9 G/DL — SIGNIFICANT CHANGE UP (ref 12–16)
IMM GRANULOCYTES NFR BLD AUTO: 0.5 % — HIGH (ref 0.1–0.3)
KETONES UR-MCNC: NEGATIVE — SIGNIFICANT CHANGE UP
LACTATE SERPL-SCNC: 1.1 MMOL/L — SIGNIFICANT CHANGE UP (ref 0.7–2)
LEUKOCYTE ESTERASE UR-ACNC: NEGATIVE — SIGNIFICANT CHANGE UP
LIDOCAIN IGE QN: 21 U/L — SIGNIFICANT CHANGE UP (ref 7–60)
LYMPHOCYTES # BLD AUTO: 1.23 K/UL — SIGNIFICANT CHANGE UP (ref 1.2–3.4)
LYMPHOCYTES # BLD AUTO: 8.5 % — LOW (ref 20.5–51.1)
MCHC RBC-ENTMCNC: 24.2 PG — LOW (ref 27–31)
MCHC RBC-ENTMCNC: 31.8 G/DL — LOW (ref 32–37)
MCV RBC AUTO: 76 FL — LOW (ref 81–99)
MONOCYTES # BLD AUTO: 0.43 K/UL — SIGNIFICANT CHANGE UP (ref 0.1–0.6)
MONOCYTES NFR BLD AUTO: 3 % — SIGNIFICANT CHANGE UP (ref 1.7–9.3)
NEUTROPHILS # BLD AUTO: 12.64 K/UL — HIGH (ref 1.4–6.5)
NEUTROPHILS NFR BLD AUTO: 87.8 % — HIGH (ref 42.2–75.2)
NITRITE UR-MCNC: NEGATIVE — SIGNIFICANT CHANGE UP
NRBC # BLD: 0 /100 WBCS — SIGNIFICANT CHANGE UP (ref 0–0)
PH UR: 6.5 — SIGNIFICANT CHANGE UP (ref 5–8)
PLATELET # BLD AUTO: 88 K/UL — LOW (ref 130–400)
POTASSIUM SERPL-MCNC: 3.7 MMOL/L — SIGNIFICANT CHANGE UP (ref 3.5–5)
POTASSIUM SERPL-SCNC: 3.7 MMOL/L — SIGNIFICANT CHANGE UP (ref 3.5–5)
PROT SERPL-MCNC: 7.6 G/DL — SIGNIFICANT CHANGE UP (ref 6–8)
PROT UR-MCNC: SIGNIFICANT CHANGE UP
RBC # BLD: 5.34 M/UL — SIGNIFICANT CHANGE UP (ref 4.2–5.4)
RBC # FLD: 16.6 % — HIGH (ref 11.5–14.5)
SODIUM SERPL-SCNC: 137 MMOL/L — SIGNIFICANT CHANGE UP (ref 135–146)
SP GR SPEC: 1.01 — SIGNIFICANT CHANGE UP (ref 1.01–1.03)
UROBILINOGEN FLD QL: SIGNIFICANT CHANGE UP
WBC # BLD: 14.4 K/UL — HIGH (ref 4.8–10.8)
WBC # FLD AUTO: 14.4 K/UL — HIGH (ref 4.8–10.8)

## 2021-06-12 PROCEDURE — 74177 CT ABD & PELVIS W/CONTRAST: CPT | Mod: 26,MA

## 2021-06-12 PROCEDURE — 71045 X-RAY EXAM CHEST 1 VIEW: CPT | Mod: 26

## 2021-06-12 PROCEDURE — 99284 EMERGENCY DEPT VISIT MOD MDM: CPT

## 2021-06-12 RX ORDER — SODIUM CHLORIDE 9 MG/ML
1000 INJECTION, SOLUTION INTRAVENOUS ONCE
Refills: 0 | Status: COMPLETED | OUTPATIENT
Start: 2021-06-12 | End: 2021-06-12

## 2021-06-12 RX ORDER — MORPHINE SULFATE 50 MG/1
4 CAPSULE, EXTENDED RELEASE ORAL ONCE
Refills: 0 | Status: DISCONTINUED | OUTPATIENT
Start: 2021-06-12 | End: 2021-06-12

## 2021-06-12 RX ORDER — FAMOTIDINE 10 MG/ML
20 INJECTION INTRAVENOUS ONCE
Refills: 0 | Status: COMPLETED | OUTPATIENT
Start: 2021-06-12 | End: 2021-06-12

## 2021-06-12 RX ADMIN — SODIUM CHLORIDE 1000 MILLILITER(S): 9 INJECTION, SOLUTION INTRAVENOUS at 17:07

## 2021-06-12 RX ADMIN — SODIUM CHLORIDE 1000 MILLILITER(S): 9 INJECTION, SOLUTION INTRAVENOUS at 12:45

## 2021-06-12 RX ADMIN — MORPHINE SULFATE 4 MILLIGRAM(S): 50 CAPSULE, EXTENDED RELEASE ORAL at 12:45

## 2021-06-12 RX ADMIN — FAMOTIDINE 20 MILLIGRAM(S): 10 INJECTION INTRAVENOUS at 17:06

## 2021-06-12 RX ADMIN — MORPHINE SULFATE 4 MILLIGRAM(S): 50 CAPSULE, EXTENDED RELEASE ORAL at 13:06

## 2021-06-12 RX ADMIN — MORPHINE SULFATE 4 MILLIGRAM(S): 50 CAPSULE, EXTENDED RELEASE ORAL at 17:07

## 2021-06-12 RX ADMIN — MORPHINE SULFATE 4 MILLIGRAM(S): 50 CAPSULE, EXTENDED RELEASE ORAL at 13:33

## 2021-06-12 NOTE — ED PROVIDER NOTE - CARE PLAN
Principal Discharge DX:	Abdominal pain   Principal Discharge DX:	Abdominal pain  Secondary Diagnosis:	Diarrhea

## 2021-06-12 NOTE — ED PROVIDER NOTE - CLINICAL SUMMARY MEDICAL DECISION MAKING FREE TEXT BOX
Patient presented with abd pain and diarrhea x 1 day. (+) tender on exam but otherwise afebrile, HD stable, well appearing. Obtained labs which were grossly unremarkable including no significant leukocytosis, anemia, signs of dehydration/ASHER, transaminitis or significant electrolyte abnormalities. UA negative for infection. CT abd/pelvis negative for emergent processes. Patient felt much better after tx in ED, and serial abdominal exams benign. Able to tolerate PO. Given the above, will discharge home with outpatient follow up. Patient agreeable with plan. Agrees to return to ED for any new or worsening symptoms.

## 2021-06-12 NOTE — ED PROVIDER NOTE - NS ED ROS FT
Constitutional:  See HPI.   Eyes:  No visual changes, eye pain or discharge.  ENMT:  No hearing changes, pain, discharge or infections. No neck pain or stiffness.  Cardiac:  No chest pain, SOB or edema. No chest pain with exertion.  Respiratory:  No cough or respiratory distress. No hemoptysis.  GI:  No nausea, vomiting, + diarrhea, Left sided abdominal pain.  :  No dysuria, frequency, hematuria  MS:  No joint pain or back pain.  Neuro:  No LOC. No headache or weakness.    Skin:  No skin rash.  Except as in HPI, all other review of systems is negative

## 2021-06-12 NOTE — ED PROVIDER NOTE - PATIENT PORTAL LINK FT
You can access the FollowMyHealth Patient Portal offered by [facility] by registering at the following website: [email or web address]. By joining [employer]’s FollowMyHealth portal, you will also be able to view your health information using other applications (apps) compatible with our system.

## 2021-06-12 NOTE — ED ADULT NURSE NOTE - OBJECTIVE STATEMENT
pt presents with epigastric abdominal pain radiates to the back with diarrhea started yesterday. pt denies nausea, and vomiting . pt reports subjective fever last night with chills. pt with hx. Cholecystectomy due to gallstones

## 2021-06-12 NOTE — ED PROVIDER NOTE - OBJECTIVE STATEMENT
26 y.o. F with no PMH + PSH of cholecystectomy with abdominal pain for 1 day, diarrhea x30 no fever chills no urinary symptoms no nausea/vomitting + fever + dizziness. 26 y.o. F with no PMH + PSH of cholecystectomy with abdominal pain for 1 day, diarrhea x30 no fever chills no urinary symptoms no nausea/vomitting + fever + dizziness. No discahrge or bleeding, LMH is last week.

## 2021-06-12 NOTE — ED PROVIDER NOTE - PHYSICAL EXAMINATION
CONSTITUTIONAL: Well-developed; well-nourished; in no acute distress.   SKIN: warm, dry  HEAD: Normocephalic; atraumatic.  EYES: PERRL, EOMI, no conjunctival erythema  ENT: No nasal discharge; airway clear.  NECK: Supple; non tender.  CARD: S1, S2 normal; no murmurs, gallops, or rubs. Regular rate and rhythm.   RESP: No wheezes, rales or rhonchi.  ABD: + RUQ abdominal tenderness  EXT: Normal ROM.  No clubbing, cyanosis or edema.   LYMPH: No acute cervical adenopathy.  NEURO: Alert, oriented, grossly unremarkable  PSYCH: Cooperative, appropriate.

## 2021-06-12 NOTE — ED PROVIDER NOTE - ATTENDING CONTRIBUTION TO CARE
26 year old female, no pmhx, presenting with 1 day of abdominal pain and multiple episodes of watery nonbloody diarrhea. Abdominal pain is diffuse, non-radiating, crampy, no palliative or provocative factors, mild severity. Also endorses (+) subjective fever and lightheadedness from the diarrhea. Denies having this before. Otherwise denies headache, vision changes, weakness/numbness, confusion, URI symptoms, neck pain, chest pain, back pain, dyspnea, cough, palpitations, nausea, vomiting, constipation, blood in stool/dark stools, urinary symptoms, vaginal bleeding/discharge, leg swelling, rash, recent travel or sick contacts.    Vital Signs: I have reviewed the initial vital signs.  Constitutional: NAD, well-nourished, appears stated age, no acute distress.  HEENT: Airway patent, moist MM, no erythema/swelling/deformity of oral structures. EOMI, PERRLA.  CV: regular rate, regular rhythm, well-perfused extremities, 2+ b/l DP and radial pulses equal.  Lungs: BCTA, no increased WOB.  ABD: (+) diffuse tenderness, no guarding or rebound, no pulsatile mass, no hernias.   MSK: Neck supple, nontender, nl ROM, no stepoff. Chest nontender. Back nontender in TLS spine or to b/l bony structures or flanks. Ext nontender, nl rom, no deformity.   INTEG: Skin warm, dry, no rash.  NEURO: A&Ox3, normal strength, nl sensation throughout, normal speech.   PSYCH: Calm, cooperative, normal affect and interaction.    Will obtain labs, CT abd/pelvis, UA, give IVF, symptomatic control PRN, re-eval.

## 2021-06-12 NOTE — ED PROVIDER NOTE - CARE PROVIDER_API CALL
Moises Suárez)  Gastroenterology; Internal Medicine  [address] [place] [address]  Phone: [phone]  Fax: [phone]  Follow Up Time:

## 2021-06-12 NOTE — ED ADULT NURSE NOTE - CHPI ED NUR SYMPTOMS NEG
no abdominal distension/no blood in stool/no burning urination/no dysuria/no hematuria/no nausea/no vomiting

## 2021-07-16 NOTE — ED ADULT NURSE NOTE - CHIEF COMPLAINT QUOTE
fell down 15 stairs 6AM this morning, positive LOC
The patient has been re-examined and I agree with the above assessment or I updated with my findings.
The patient has been re-examined and I agree with the above assessment or I updated with my findings.

## 2021-08-16 ENCOUNTER — EMERGENCY (EMERGENCY)
Facility: HOSPITAL | Age: 26
LOS: 0 days | Discharge: HOME | End: 2021-08-17
Attending: EMERGENCY MEDICINE | Admitting: EMERGENCY MEDICINE
Payer: COMMERCIAL

## 2021-08-16 VITALS
WEIGHT: 227.08 LBS | HEIGHT: 64 IN | HEART RATE: 66 BPM | SYSTOLIC BLOOD PRESSURE: 154 MMHG | DIASTOLIC BLOOD PRESSURE: 94 MMHG | RESPIRATION RATE: 18 BRPM | OXYGEN SATURATION: 99 % | TEMPERATURE: 99 F

## 2021-08-16 DIAGNOSIS — Z90.49 ACQUIRED ABSENCE OF OTHER SPECIFIED PARTS OF DIGESTIVE TRACT: Chronic | ICD-10-CM

## 2021-08-16 DIAGNOSIS — D50.9 IRON DEFICIENCY ANEMIA, UNSPECIFIED: ICD-10-CM

## 2021-08-16 DIAGNOSIS — R79.89 OTHER SPECIFIED ABNORMAL FINDINGS OF BLOOD CHEMISTRY: ICD-10-CM

## 2021-08-16 DIAGNOSIS — D69.6 THROMBOCYTOPENIA, UNSPECIFIED: ICD-10-CM

## 2021-08-16 DIAGNOSIS — Z98.890 OTHER SPECIFIED POSTPROCEDURAL STATES: Chronic | ICD-10-CM

## 2021-08-16 LAB
ALBUMIN SERPL ELPH-MCNC: 4.3 G/DL — SIGNIFICANT CHANGE UP (ref 3.5–5.2)
ALP SERPL-CCNC: 75 U/L — SIGNIFICANT CHANGE UP (ref 30–115)
ALT FLD-CCNC: 16 U/L — SIGNIFICANT CHANGE UP (ref 0–41)
ANION GAP SERPL CALC-SCNC: 11 MMOL/L — SIGNIFICANT CHANGE UP (ref 7–14)
APTT BLD: 36.1 SEC — SIGNIFICANT CHANGE UP (ref 27–39.2)
AST SERPL-CCNC: 17 U/L — SIGNIFICANT CHANGE UP (ref 0–41)
BILIRUB SERPL-MCNC: <0.2 MG/DL — SIGNIFICANT CHANGE UP (ref 0.2–1.2)
BUN SERPL-MCNC: 14 MG/DL — SIGNIFICANT CHANGE UP (ref 10–20)
CALCIUM SERPL-MCNC: 8.8 MG/DL — SIGNIFICANT CHANGE UP (ref 8.5–10.1)
CHLORIDE SERPL-SCNC: 104 MMOL/L — SIGNIFICANT CHANGE UP (ref 98–110)
CO2 SERPL-SCNC: 24 MMOL/L — SIGNIFICANT CHANGE UP (ref 17–32)
CREAT SERPL-MCNC: 0.6 MG/DL — LOW (ref 0.7–1.5)
GLUCOSE SERPL-MCNC: 89 MG/DL — SIGNIFICANT CHANGE UP (ref 70–99)
HCG SERPL QL: NEGATIVE — SIGNIFICANT CHANGE UP
INR BLD: 1.06 RATIO — SIGNIFICANT CHANGE UP (ref 0.65–1.3)
POTASSIUM SERPL-MCNC: 4 MMOL/L — SIGNIFICANT CHANGE UP (ref 3.5–5)
POTASSIUM SERPL-SCNC: 4 MMOL/L — SIGNIFICANT CHANGE UP (ref 3.5–5)
PROT SERPL-MCNC: 6.7 G/DL — SIGNIFICANT CHANGE UP (ref 6–8)
PROTHROM AB SERPL-ACNC: 12.2 SEC — SIGNIFICANT CHANGE UP (ref 9.95–12.87)
SODIUM SERPL-SCNC: 139 MMOL/L — SIGNIFICANT CHANGE UP (ref 135–146)

## 2021-08-16 PROCEDURE — 99284 EMERGENCY DEPT VISIT MOD MDM: CPT

## 2021-08-16 NOTE — ED PROVIDER NOTE - ATTENDING CONTRIBUTION TO CARE
hx of anemia, and low platelets. followed by dr pena at [facility]. told to come in for low platelets. no bleeding, but easy bruising. no sob, no rectal bleeding.  vss, nontoxic, well appearing, pink conj, anicteric, MMM, neck supple, CTAB, RRR, equal radial pulses bilat, abd soft/nt/nd, no cva tend. no calves tend, no edema, no fnd. no rashes.   - labs

## 2021-08-16 NOTE — ED PROVIDER NOTE - NSICDXPASTSURGICALHX_GEN_ALL_CORE_FT
PAST SURGICAL HISTORY:  H/O endoscopy stent placement in duct of gallbladder    History of appendectomy

## 2021-08-16 NOTE — ED ADULT NURSE NOTE - CAS DISCH BELONGINGS RETURNED
[place] Screen presents today for   Chief Complaint   Patient presents with    Neck Pain     follow up    Anemia    Medication Refill       [place] Screen preferred language for health care discussion is english/other. Is someone accompanying this pt? no    Is the patient using any DME equipment during [address]? no    Depression Screening:  3 most recent PHQ Screens 4/11/2019   Little interest or pleasure in doing things Not at all   Feeling down, depressed, irritable, or hopeless Not at all   Total Score PHQ 2 0       Learning Assessment:  Learning Assessment 8/19/2019   PRIMARY LEARNER Patient   HIGHEST LEVEL OF EDUCATION - PRIMARY LEARNER  GRADUATED HIGH SCHOOL OR GED   BARRIERS PRIMARY LEARNER NONE   CO-LEARNER CAREGIVER No   PRIMARY LANGUAGE ENGLISH   LEARNER PREFERENCE PRIMARY LISTENING   ANSWERED BY self   RELATIONSHIP SELF       Abuse Screening:  Abuse Screening Questionnaire 4/11/2019   Do you ever feel afraid of your partner? N   Are you in a relationship with someone who physically or mentally threatens you? N   Is it safe for you to go home? Y       Generalized Anxiety  No flowsheet data found. Health Maintenance Due   Topic Date Due    Pneumococcal 0-64 years (1 of 1 - PPSV23) 03/01/1981    DTaP/Tdap/Td series (1 - Tdap) 03/01/1996    Influenza Age 5 to Adult  08/01/2019   . Health Maintenance reviewed and discussed and ordered per Provider. [place] Screen is updated on all     Coordination of Care:  1. Have you been to the ER, urgent care clinic since your last visit? Hospitalized since your last visit? no    2. Have you seen or consulted any other health care providers outside of the [address] [place] [address] since your last visit? Include any pap smears or colon screening. no      Advance Directive:  1. Do you have an advance directive in place? Patient Reply:no    2. If not, would you like material regarding how to put one in place?  Patient Reply: no    Vikash Part 1977 female who presents for routine immunizations. Patient denies any symptoms , reactions or allergies that would exclude them from being immunized today. Risks and adverse reactions were discussed and the VIS was given to them. All questions were addressed. Order placed for TDap, Flu, and PPSV23,  per Verbal Order from Dr. Nano Garcia with read back. Patient was observed for 15 min post injection. There were no reactions observed.     Magali Boyer Not applicable

## 2021-08-16 NOTE — ED ADULT NURSE NOTE - OBJECTIVE STATEMENT
Pt unable to get hold of his hematologist , sent in by PCP c/o platelet of 3.9 , pt feeling tired , no respiratory distress  , denies any pain , AO x 4 , no grimaced face noted , not pale looking , no SOB

## 2021-08-16 NOTE — ED PROVIDER NOTE - OBJECTIVE STATEMENT
25 y/o F with PMH iron deficiency anemia presents with low platelets on routine outpt lab work done by PCP on 8/4/2021. couldn't get in with her heme, so she presented here.  no epistaxis/rectal bleeding/gingival bleeding/heavy vaginal bleeding/other bleeding.   does relate easy bruising x mos.   no atrumatic brusing.

## 2021-08-16 NOTE — ED PROVIDER NOTE - NS ED ROS FT
Review of Systems    Constitutional: (-) fever   Eyes/ENT: (-) vision changes, (-) epistaxis   Cardiovascular: (-) chest pain, (-) syncope (-) palpitations  Respiratory: (-) cough, (-) shortness of breath  Gastrointestinal: (-) vomiting, (-) diarrhea (-)black/bloody stools (-) abdominal pain  Musculoskeletal: (-) neck pain, (-) back pain, (-) leg pain/swelling  Integumentary: (-) rash, (-) edema  Neurological: (-) headache  Hematologic: (+) easy bruising

## 2021-08-16 NOTE — ED PROVIDER NOTE - PROGRESS NOTE DETAILS
Authored by Dr. Solitario: s/o to dr carrizales. check labs and dispo SEAN: Reviewed all results and necessity for follow up. Counseled on red flags and to return for them.  Patient appears well on discharge. Chart complete

## 2021-08-16 NOTE — ED PROVIDER NOTE - PHYSICAL EXAMINATION
PHYSICAL EXAM:    GENERAL: Alert, appears stated age, well appearing, non-toxic  SKIN: Warm, pink and dry. MMM.   HEAD: NC  EYE: Normal lids/conjunctiva  ENT: Normal hearing, patent oropharynx  NECK: +supple. No meningismus, or JVD  Pulm: Bilateral BS, normal resp effort, no wheezes, stridor, or retractions  CV: RRR, no M/R/G, 2+and = radial pulses  Abd: soft, non-tender, non-distended, no hepatosplenomegaly  Mskel: no erythema, cyanosis, edema. no calf tenderness  Neuro: AAOx3. normal gait

## 2021-08-16 NOTE — ED ADULT NURSE NOTE - NSIMPLEMENTINTERV_GEN_ALL_ED
Implemented All Universal Safety Interventions:  [place] to call system. Call bell, personal items and telephone within reach. Instruct patient to call for assistance. Room bathroom lighting operational. Non-slip footwear when patient is off stretcher. Physically safe environment: no spills, clutter or unnecessary equipment. Stretcher in lowest position, wheels locked, appropriate side rails in place.

## 2021-08-16 NOTE — ED PROVIDER NOTE - PATIENT PORTAL LINK FT
You can access the FollowMyHealth Patient Portal offered by [facility] by registering at the following website: [email or web address]. By joining [facility]’s FollowMyHealth portal, you will also be able to view your health information using other applications (apps) compatible with our system.

## 2021-08-16 NOTE — ED PROVIDER NOTE - CARE PROVIDER_API CALL
Gio Jamil)  Internal Medicine; Medical Oncology  [address] [place] [address]  Phone: [phone]  Fax: [phone]  Follow Up Time: 1-3 Days

## 2021-08-17 VITALS
RESPIRATION RATE: 18 BRPM | OXYGEN SATURATION: 100 % | DIASTOLIC BLOOD PRESSURE: 77 MMHG | SYSTOLIC BLOOD PRESSURE: 130 MMHG | HEART RATE: 65 BPM | TEMPERATURE: 98 F

## 2021-08-17 LAB
BASOPHILS # BLD AUTO: 0.04 K/UL — SIGNIFICANT CHANGE UP (ref 0–0.2)
BASOPHILS NFR BLD AUTO: 0.4 % — SIGNIFICANT CHANGE UP (ref 0–1)
EOSINOPHIL # BLD AUTO: 0.05 K/UL — SIGNIFICANT CHANGE UP (ref 0–0.7)
EOSINOPHIL NFR BLD AUTO: 0.5 % — SIGNIFICANT CHANGE UP (ref 0–8)
HCT VFR BLD CALC: 36.1 % — LOW (ref 37–47)
HGB BLD-MCNC: 11.1 G/DL — LOW (ref 12–16)
IMM GRANULOCYTES NFR BLD AUTO: 0.4 % — HIGH (ref 0.1–0.3)
LYMPHOCYTES # BLD AUTO: 3.51 K/UL — HIGH (ref 1.2–3.4)
LYMPHOCYTES # BLD AUTO: 33.7 % — SIGNIFICANT CHANGE UP (ref 20.5–51.1)
MCHC RBC-ENTMCNC: 24.2 PG — LOW (ref 27–31)
MCHC RBC-ENTMCNC: 30.7 G/DL — LOW (ref 32–37)
MCV RBC AUTO: 78.6 FL — LOW (ref 81–99)
MONOCYTES # BLD AUTO: 0.84 K/UL — HIGH (ref 0.1–0.6)
MONOCYTES NFR BLD AUTO: 8.1 % — SIGNIFICANT CHANGE UP (ref 1.7–9.3)
NEUTROPHILS # BLD AUTO: 5.94 K/UL — SIGNIFICANT CHANGE UP (ref 1.4–6.5)
NEUTROPHILS NFR BLD AUTO: 56.9 % — SIGNIFICANT CHANGE UP (ref 42.2–75.2)
NRBC # BLD: 0 /100 WBCS — SIGNIFICANT CHANGE UP (ref 0–0)
PLATELET # BLD AUTO: 235 K/UL — SIGNIFICANT CHANGE UP (ref 130–400)
RBC # BLD: 4.59 M/UL — SIGNIFICANT CHANGE UP (ref 4.2–5.4)
RBC # FLD: 16.3 % — HIGH (ref 11.5–14.5)
WBC # BLD: 10.42 K/UL — SIGNIFICANT CHANGE UP (ref 4.8–10.8)
WBC # FLD AUTO: 10.42 K/UL — SIGNIFICANT CHANGE UP (ref 4.8–10.8)

## 2021-08-22 NOTE — ED ADULT NURSE NOTE - EENT WDL
Eyes with no visual disturbances.  Ears clean and dry and no hearing difficulties. Nose with pink mucosa and no drainage.  Mouth mucous membranes moist and pink.  No tenderness or swelling to throat or neck.
(2) more than 100 beats/min

## 2021-09-14 ENCOUNTER — APPOINTMENT (OUTPATIENT)
Dept: HEMATOLOGY ONCOLOGY | Facility: CLINIC | Age: 26
End: 2021-09-14
Payer: COMMERCIAL

## 2021-09-14 ENCOUNTER — OUTPATIENT (OUTPATIENT)
Dept: OUTPATIENT SERVICES | Facility: HOSPITAL | Age: 26
LOS: 1 days | Discharge: HOME | End: 2021-09-14

## 2021-09-14 ENCOUNTER — LABORATORY RESULT (OUTPATIENT)
Age: 26
End: 2021-09-14

## 2021-09-14 DIAGNOSIS — Z90.49 ACQUIRED ABSENCE OF OTHER SPECIFIED PARTS OF DIGESTIVE TRACT: Chronic | ICD-10-CM

## 2021-09-14 DIAGNOSIS — Z98.890 OTHER SPECIFIED POSTPROCEDURAL STATES: Chronic | ICD-10-CM

## 2021-09-14 PROCEDURE — 99212 OFFICE O/P EST SF 10 MIN: CPT

## 2021-09-22 LAB
HCT VFR BLD CALC: 36.1 %
HGB BLD-MCNC: 11.1 G/DL
MCHC RBC-ENTMCNC: 24.3 PG
MCHC RBC-ENTMCNC: 30.7 G/DL
MCV RBC AUTO: 79.2 FL
PLATELET # BLD AUTO: 163 K/UL
PMV BLD: 11.2 FL
RBC # BLD: 4.56 M/UL
RBC # FLD: 16.4 %
WBC # FLD AUTO: 8.16 K/UL

## 2021-10-15 DIAGNOSIS — D69.6 THROMBOCYTOPENIA, UNSPECIFIED: ICD-10-CM

## 2021-11-09 ENCOUNTER — APPOINTMENT (OUTPATIENT)
Dept: HEMATOLOGY ONCOLOGY | Facility: CLINIC | Age: 26
End: 2021-11-09

## 2021-12-03 NOTE — ED ADULT NURSE NOTE - LOCATION
[FreeTextEntry1] : 53 y/o woman with PMH of obesity, repair of peroneal tendon of left\par ankle on 8/6/21 complicated by wound dehiscence with infection that grew\par Staphylococcus pseud-intermedius, admitted for I&D sp OR and Vacuum dressing placement\par on 10/13/21. She was discharged with zyvox at that time. \par The wound healed very well. now comes for HBOT. The left ankle and lower leg is swollen but no warmth or sign of cellulitis. \par She had another culture that showed staph epidermidis. \par No fever. \par 
[FreeTextEntry1] : 55 y/o woman with PMH of obesity, repair of peroneal tendon of left\par ankle on 8/6/21 complicated by wound dehiscence with infection that grew\par Staphylococcus pseud-intermedius, admitted for I&D sp OR and Vacuum dressing placement\par on 10/13/21. She was discharged with zyvox at that time. \par The wound healed very well. now comes for HBOT. The left ankle and lower leg is swollen but no warmth or sign of cellulitis. \par She had another culture that showed staph epidermidis. \par No fever. \par 
toe

## 2021-12-09 ENCOUNTER — TRANSCRIPTION ENCOUNTER (OUTPATIENT)
Age: 26
End: 2021-12-09

## 2022-01-05 ENCOUNTER — TRANSCRIPTION ENCOUNTER (OUTPATIENT)
Age: 27
End: 2022-01-05
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
## 2022-04-04 NOTE — ED ADULT NURSE NOTE - NSFALLRSKUNASSIST_ED_ALL_ED
no Quinolones Pregnancy And Lactation Text: This medication is Pregnancy Category C and it isn't know if it is safe during pregnancy. It is also excreted in breast milk.

## 2022-08-10 ENCOUNTER — APPOINTMENT (OUTPATIENT)
Dept: HEMATOLOGY ONCOLOGY | Facility: CLINIC | Age: 27
End: 2022-08-10

## 2022-08-10 ENCOUNTER — LABORATORY RESULT (OUTPATIENT)
Age: 27
End: 2022-08-10

## 2022-08-10 ENCOUNTER — OUTPATIENT (OUTPATIENT)
Dept: OUTPATIENT SERVICES | Facility: HOSPITAL | Age: 27
LOS: 1 days | Discharge: HOME | End: 2022-08-10

## 2022-08-10 DIAGNOSIS — Z98.890 OTHER SPECIFIED POSTPROCEDURAL STATES: Chronic | ICD-10-CM

## 2022-08-10 DIAGNOSIS — Z90.49 ACQUIRED ABSENCE OF OTHER SPECIFIED PARTS OF DIGESTIVE TRACT: Chronic | ICD-10-CM

## 2022-08-10 DIAGNOSIS — D69.6 THROMBOCYTOPENIA, UNSPECIFIED: ICD-10-CM

## 2022-08-10 PROCEDURE — ZZZZZ: CPT

## 2022-08-11 LAB
HCT VFR BLD CALC: 38.6 %
HGB BLD-MCNC: 12.4 G/DL
MCHC RBC-ENTMCNC: 25.3 PG
MCHC RBC-ENTMCNC: 32.1 G/DL
MCV RBC AUTO: 78.8 FL
PLATELET # BLD AUTO: 89 K/UL
PMV BLD: 11 FL
RBC # BLD: 4.9 M/UL
RBC # FLD: 15.6 %
WBC # FLD AUTO: 11.58 K/UL

## 2022-08-16 ENCOUNTER — APPOINTMENT (OUTPATIENT)
Dept: HEMATOLOGY ONCOLOGY | Facility: CLINIC | Age: 27
End: 2022-08-16

## 2022-08-24 ENCOUNTER — TRANSCRIPTION ENCOUNTER (OUTPATIENT)
Age: 27
End: 2022-08-24

## 2022-09-09 DIAGNOSIS — D69.6 THROMBOCYTOPENIA, UNSPECIFIED: ICD-10-CM

## 2022-10-27 NOTE — ED ADULT NURSE NOTE - NSFALLRSKASSESSDT_ED_ALL_ED
Appt rescheduled. ----- Message from Emily Perez sent at 10/27/2022 12:11 PM EDT -----  Subject: Appointment Request    Reason for Call: Established Patient Appointment needed: Routine Existing   Condition Follow Up    QUESTIONS    Reason for appointment request? Available appointments did not meet   patient need     Additional Information for Provider? Pt states she was supposed to come in   for a blood pressure check today she started coughing and sneezing today   and doesn't want to bring it into the office. ---------------------------------------------------------------------------  --------------  Aaliyah Phoenix XMVJ  [phone];  Do not leave any message, patient will call back for answer  ---------------------------------------------------------------------------  --------------  SCRIPT ANSWERS  KEMAR Screen: Nita Castro
16-Aug-2021 21:43

## 2023-02-15 ENCOUNTER — EMERGENCY (EMERGENCY)
Facility: HOSPITAL | Age: 28
LOS: 0 days | Discharge: HOME | End: 2023-02-16
Attending: EMERGENCY MEDICINE
Payer: COMMERCIAL

## 2023-02-15 VITALS
HEIGHT: 65 IN | WEIGHT: 220.02 LBS | OXYGEN SATURATION: 98 % | SYSTOLIC BLOOD PRESSURE: 138 MMHG | TEMPERATURE: 98 F | HEART RATE: 121 BPM | RESPIRATION RATE: 20 BRPM | DIASTOLIC BLOOD PRESSURE: 99 MMHG

## 2023-02-15 DIAGNOSIS — R51.9 HEADACHE, UNSPECIFIED: ICD-10-CM

## 2023-02-15 DIAGNOSIS — Z90.49 ACQUIRED ABSENCE OF OTHER SPECIFIED PARTS OF DIGESTIVE TRACT: Chronic | ICD-10-CM

## 2023-02-15 DIAGNOSIS — R07.89 OTHER CHEST PAIN: ICD-10-CM

## 2023-02-15 DIAGNOSIS — R00.0 TACHYCARDIA, UNSPECIFIED: ICD-10-CM

## 2023-02-15 DIAGNOSIS — R05.1 ACUTE COUGH: ICD-10-CM

## 2023-02-15 DIAGNOSIS — Z86.2 PERSONAL HISTORY OF DISEASES OF THE BLOOD AND BLOOD-FORMING ORGANS AND CERTAIN DISORDERS INVOLVING THE IMMUNE MECHANISM: ICD-10-CM

## 2023-02-15 DIAGNOSIS — Z98.890 OTHER SPECIFIED POSTPROCEDURAL STATES: Chronic | ICD-10-CM

## 2023-02-15 DIAGNOSIS — Z90.49 ACQUIRED ABSENCE OF OTHER SPECIFIED PARTS OF DIGESTIVE TRACT: ICD-10-CM

## 2023-02-15 LAB
ALBUMIN SERPL ELPH-MCNC: 4.7 G/DL — SIGNIFICANT CHANGE UP (ref 3.5–5.2)
ALP SERPL-CCNC: 86 U/L — SIGNIFICANT CHANGE UP (ref 30–115)
ALT FLD-CCNC: 56 U/L — HIGH (ref 0–41)
ANION GAP SERPL CALC-SCNC: 10 MMOL/L — SIGNIFICANT CHANGE UP (ref 7–14)
AST SERPL-CCNC: 35 U/L — SIGNIFICANT CHANGE UP (ref 0–41)
BASOPHILS # BLD AUTO: 0.03 K/UL — SIGNIFICANT CHANGE UP (ref 0–0.2)
BASOPHILS NFR BLD AUTO: 0.4 % — SIGNIFICANT CHANGE UP (ref 0–1)
BILIRUB SERPL-MCNC: <0.2 MG/DL — SIGNIFICANT CHANGE UP (ref 0.2–1.2)
BUN SERPL-MCNC: 10 MG/DL — SIGNIFICANT CHANGE UP (ref 10–20)
CALCIUM SERPL-MCNC: 9.1 MG/DL — SIGNIFICANT CHANGE UP (ref 8.4–10.4)
CHLORIDE SERPL-SCNC: 104 MMOL/L — SIGNIFICANT CHANGE UP (ref 98–110)
CO2 SERPL-SCNC: 26 MMOL/L — SIGNIFICANT CHANGE UP (ref 17–32)
CREAT SERPL-MCNC: 0.7 MG/DL — SIGNIFICANT CHANGE UP (ref 0.7–1.5)
D DIMER BLD IA.RAPID-MCNC: <150 NG/ML DDU — SIGNIFICANT CHANGE UP
EGFR: 119 ML/MIN/1.73M2 — SIGNIFICANT CHANGE UP
EOSINOPHIL # BLD AUTO: 0.08 K/UL — SIGNIFICANT CHANGE UP (ref 0–0.7)
EOSINOPHIL NFR BLD AUTO: 0.9 % — SIGNIFICANT CHANGE UP (ref 0–8)
GLUCOSE SERPL-MCNC: 95 MG/DL — SIGNIFICANT CHANGE UP (ref 70–99)
HCG SERPL QL: NEGATIVE — SIGNIFICANT CHANGE UP
HCT VFR BLD CALC: 41.3 % — SIGNIFICANT CHANGE UP (ref 37–47)
HGB BLD-MCNC: 12.7 G/DL — SIGNIFICANT CHANGE UP (ref 12–16)
IMM GRANULOCYTES NFR BLD AUTO: 0.6 % — HIGH (ref 0.1–0.3)
LYMPHOCYTES # BLD AUTO: 2.65 K/UL — SIGNIFICANT CHANGE UP (ref 1.2–3.4)
LYMPHOCYTES # BLD AUTO: 31.3 % — SIGNIFICANT CHANGE UP (ref 20.5–51.1)
MCHC RBC-ENTMCNC: 25.4 PG — LOW (ref 27–31)
MCHC RBC-ENTMCNC: 30.8 G/DL — LOW (ref 32–37)
MCV RBC AUTO: 82.6 FL — SIGNIFICANT CHANGE UP (ref 81–99)
MONOCYTES # BLD AUTO: 0.88 K/UL — HIGH (ref 0.1–0.6)
MONOCYTES NFR BLD AUTO: 10.4 % — HIGH (ref 1.7–9.3)
NEUTROPHILS # BLD AUTO: 4.77 K/UL — SIGNIFICANT CHANGE UP (ref 1.4–6.5)
NEUTROPHILS NFR BLD AUTO: 56.4 % — SIGNIFICANT CHANGE UP (ref 42.2–75.2)
NRBC # BLD: 0 /100 WBCS — SIGNIFICANT CHANGE UP (ref 0–0)
NT-PROBNP SERPL-SCNC: 11 PG/ML — SIGNIFICANT CHANGE UP (ref 0–300)
PLATELET # BLD AUTO: 217 K/UL — SIGNIFICANT CHANGE UP (ref 130–400)
POTASSIUM SERPL-MCNC: 4 MMOL/L — SIGNIFICANT CHANGE UP (ref 3.5–5)
POTASSIUM SERPL-SCNC: 4 MMOL/L — SIGNIFICANT CHANGE UP (ref 3.5–5)
PROT SERPL-MCNC: 7.3 G/DL — SIGNIFICANT CHANGE UP (ref 6–8)
RBC # BLD: 5 M/UL — SIGNIFICANT CHANGE UP (ref 4.2–5.4)
RBC # FLD: 15.4 % — HIGH (ref 11.5–14.5)
SODIUM SERPL-SCNC: 140 MMOL/L — SIGNIFICANT CHANGE UP (ref 135–146)
TROPONIN T SERPL-MCNC: <0.01 NG/ML — SIGNIFICANT CHANGE UP
WBC # BLD: 8.46 K/UL — SIGNIFICANT CHANGE UP (ref 4.8–10.8)
WBC # FLD AUTO: 8.46 K/UL — SIGNIFICANT CHANGE UP (ref 4.8–10.8)

## 2023-02-15 PROCEDURE — 85025 COMPLETE CBC W/AUTO DIFF WBC: CPT

## 2023-02-15 PROCEDURE — 80053 COMPREHEN METABOLIC PANEL: CPT

## 2023-02-15 PROCEDURE — 36415 COLL VENOUS BLD VENIPUNCTURE: CPT

## 2023-02-15 PROCEDURE — 85379 FIBRIN DEGRADATION QUANT: CPT

## 2023-02-15 PROCEDURE — 93005 ELECTROCARDIOGRAM TRACING: CPT

## 2023-02-15 PROCEDURE — 93010 ELECTROCARDIOGRAM REPORT: CPT

## 2023-02-15 PROCEDURE — 71046 X-RAY EXAM CHEST 2 VIEWS: CPT

## 2023-02-15 PROCEDURE — 84703 CHORIONIC GONADOTROPIN ASSAY: CPT

## 2023-02-15 PROCEDURE — 83880 ASSAY OF NATRIURETIC PEPTIDE: CPT

## 2023-02-15 PROCEDURE — 99285 EMERGENCY DEPT VISIT HI MDM: CPT | Mod: 25

## 2023-02-15 PROCEDURE — 71046 X-RAY EXAM CHEST 2 VIEWS: CPT | Mod: 26

## 2023-02-15 PROCEDURE — 70450 CT HEAD/BRAIN W/O DYE: CPT | Mod: MA

## 2023-02-15 PROCEDURE — 84484 ASSAY OF TROPONIN QUANT: CPT

## 2023-02-15 PROCEDURE — 99285 EMERGENCY DEPT VISIT HI MDM: CPT

## 2023-02-15 RX ORDER — ACETAMINOPHEN 500 MG
975 TABLET ORAL ONCE
Refills: 0 | Status: COMPLETED | OUTPATIENT
Start: 2023-02-15 | End: 2023-02-15

## 2023-02-15 RX ADMIN — Medication 975 MILLIGRAM(S): at 22:36

## 2023-02-15 NOTE — ED ADULT TRIAGE NOTE - INTERNATIONAL TRAVEL
AUTHORIZATION FOR SURGICAL OPERATION OR OTHER PROCEDURE    1. I hereby [facility] Dr. Azar Wilson, [facility] staff assigned to my case to perform the following operation and/or procedure at the [facility]:    _______________________________________________________________________________________________    Cortisone Injection Left foot  _______________________________________________________________________________________________    2. My physician has explained the nature and purpose of the operation or other procedure, possible alternative methods of treatment, the risks involved, and the possibility of complication to me. I acknowledge that no guarantee has been made as to the result that may be obtained. 3.  I recognize that, during the course of this operation, or other procedure, unforseen conditions may necessitate additional or different procedure than those listed above. I, therefore, further authorize and request that the above named physician, his/her physician assistants or designees perform such procedures as are, in his/her professional opinion, necessary and desirable. 4.  Any tissue or organs removed in the operation or other procedure may be disposed of by and at the discretion of the [facility] and [address]. 5.  I understand that in the event of a medical emergency, I will be transported by local paramedics to [facility] or other [facility] emergency department. 6.  I certify that I have read and fully understand the above consent to operation and/or other procedure. 7.  I acknowledge that my physician has explained sedation/analgesia administration to me including the risks and benefits. I consent to the administration of sedation/analgesia as may be necessary or desirable in the judgement of my physician.     Witness signature: ___________________________________________________ Date:  ______/______/_____                    Time: ________ A. M.  P.M. Patient Name:  ______________________________________________________  (please print)      Patient signature:  ___________________________________________________             Relationship to Patient:           []  Parent    Responsible person                          []  Spouse  In case of minor or                    [] Other  _____________   Incompetent name:  __________________________________________________                               (please print)      _____________      Responsible person  In case of minor or  Incompetent signature:  _______________________________________________    Statement of Physician  My signature below affirms that prior to the time of the procedure, I have explained to the patient and/or his/her guardian, the risks and benefits involved in the proposed treatment and any reasonable alternative to the proposed treatment. I have also explained the risks and benefits involved in the refusal of the proposed treatment and have answered the patient's questions.                         Date:  ______/______/_______  Provider                      Signature:  __________________________________________________________       Time:  ___________ A.M    P.M. No

## 2023-02-15 NOTE — ED PROVIDER NOTE - IV ALTEPLASE EXCL REL HIDDEN
[facility]  [email or web address]  [phone]    Zack Andre MD, [facility]       Annual Gynecologic Exam  WWE 44-25  Chief Complaint   Patient presents with    Well Woman       Sal Schmitt is a 50 y.o.  WHITE OR   female who presents for an annual exam.  Patient's last menstrual period was 2017 (exact date). .    She does not report additional concerns today. Occ hot flashes, some cycles 5-6 wks apart. Menstrual status:  Her periods are heavy at times. She does not report dysmenorrhea/painful menses. She does not report irregular bleeding. Sexual history and Contraception:  History   Sexual Activity    Sexual activity: Yes    Partners: Male    Birth control/ protection: None     Comment:  \"Farran\"         She does not reports new sexual partner(s) in the last year. The patient does not request STD testing. Preventive Medicine History:  Her last annual GYN exam was about one year ago. Her most recent Pap smear result: normal was obtained in 2015. Her most recent HR HPV screen was Negative obtained 2 year(s) ago. She does not have a history of CARLOS 2, 3 or cervical cancer. Breast health:  Last mammogram: approximate date 1/20/15 and was normal.   A mammogram was scheduled for today. Breast cancer family updated: see . Bone health: Chrissie Jose She does not have a history of osteopenia/osteoporosis. Osteoporosis family history updated: see .      Past Medical History:   Diagnosis Date    DJD (degenerative joint disease), lumbar 2010    H/O diagnostic mammography 13    BIRADS I - right side    History of mammogram 10/9/13    needs additional views    Hx of mammogram 01/15/15    normal    Pap smear for cervical cancer screening 6/2/10    Negative, HPV negative    Pap smear for cervical cancer screening 01/20/15    normal hpv negative     OB History    Para Term  AB SAB TAB Ectopic Multiple Living   2 2 2 0 0 0 0 0 0 2      # Outcome Date GA Lbr Lavon/2nd Weight Sex Delivery Anes PTL Lv   2 Term 08/09/99    F VAGINAL DELI   Y   1 Term 03/28/97    M VAGINAL DELI   Y      Obstetric Comments   No pregnancy complications       Past Surgical History:   Procedure Laterality Date    HX WISDOM TEETH EXTRACTION       Family History   Problem Relation Age of Onset    Heart Disease Father      multiple stents, age 66's     Social History     Social History    Marital status:      Spouse name: N/A    Number of children: 2    Years of education: N/A     Occupational History     [employer]     Social History Main Topics    Smoking status: Never Smoker    Smokeless tobacco: Never Used    Alcohol use Yes      Comment: occasionally     Drug use: No    Sexual activity: Yes     Partners: Male     Birth control/ protection: None      Comment:  \"Farran\"     Other Topics Concern    Special Diet Yes     1200 calorie    Exercise Yes     at least 4 days/week     Social History Narrative       Allergies   Allergen Reactions    Amoxicillin Hives and Rash     No shortness of breath       Current Outpatient Prescriptions   Medication Sig    levothyroxine (SYNTHROID) 75 mcg tablet Take  by mouth Daily (before breakfast).  cyanocobalamin 1,000 mcg tablet Take 1,000 mcg by mouth daily.  multivitamin capsule Take 1 Cap by mouth daily. No current facility-administered medications for this visit. Patient Active Problem List   Diagnosis Code    DJD (degenerative joint disease), lumbar M47.816    Abnormal mammogram R92.8    Obesity (BMI 30.0-34. 9) E66.9       Review of Systems - History obtained from the patient  Constitutional: negative for weight loss, fever, night sweats  HEENT: negative for hearing loss, earache, congestion, snoring, sorethroat  CV: negative for chest pain, palpitations, edema  Resp: negative for cough, shortness of breath, wheezing  GI: negative for change in show bowel habits, abdominal pain, black or bloody stools  : negative for frequency, dysuria, hematuria, vaginal discharge  MSK: negative for back pain, joint pain, muscle pain  Breast: negative for breast lumps, nipple discharge, galactorrhea  Skin :negative for itching, rash, hives  Neuro: negative for dizziness, headache, confusion, weakness  Psych: negative for anxiety, depression, change in mood  Heme/lymph: negative for bleeding, bruising, pallor    Physical Exam  Visit Vitals    /72    Ht 5' 2\" (1.575 m)    Wt 182 lb 6.4 oz (82.7 kg)    LMP 02/07/2017 (Exact Date)    BMI 33.36 kg/m2       Constitutional  · Appearance: well-nourished, well developed, alert, in no acute distress    HENT  · Head and Face: appears normal    Neck  · Inspection/Palpation: normal appearance, no masses or tenderness  · Lymph Nodes: no lymphadenopathy present  · Thyroid: gland size normal, nontender, no nodules or masses present on palpation    Chest  · Respiratory Effort: breathing labored  · Auscultation: normal breath sounds    Cardiovascular  · Heart:  · Auscultation: regular rate and rhythm without murmur    Breasts  · Inspection of Breasts: breasts symmetrical, no skin changes, no discharge present, nipple appearance normal, no skin retraction present  · Palpation of Breasts and Axillae: no masses present on palpation, no breast tenderness  · Axillary Lymph Nodes: no lymphadenopathy present    Gastrointestinal  · Abdominal Examination: abdomen non-tender to palpation, normal bowel sounds, no masses present  · Liver and spleen: no hepatomegaly present, spleen not palpable  · Hernias: no hernias identified    Genitourinary  · External Genitalia: normal appearance for age, no discharge present, no tenderness present, no inflammatory lesions present, no masses present, no atrophy present  · Vagina: normal vaginal vault without central or paravaginal defects, no discharge present, no inflammatory lesions present, no masses present  · Bladder: non-tender to palpation  · Urethra: appears normal  · Cervix: normal   · Uterus: normal size, shape and consistency  · Adnexa: no adnexal tenderness present, no adnexal masses present  · Perineum: perineum within normal limits, no evidence of trauma, no rashes or skin lesions present  · Anus: anus within normal limits, no hemorrhoids present  · Inguinal Lymph Nodes: no lymphadenopathy present    Skin  · General Inspection: no rash, no lesions identified    Neurologic/Psychiatric  · Mental Status:  · Orientation: grossly oriented to person, place and time  · Mood and Affect: mood normal, affect appropriate    Assessment:  50 y.o.  for well woman exam  Encounter Diagnosis   Name Primary?  Encounter for gynecological examination (general) (routine) without abnormal findings Yes       Plan:  The patient was counseled about diet, exercise, healthy lifestyle  We discussed current self breast exam and mammogram recommendations  We discussed current pap smear and HR HPV testing guidelines  We recommend follow up in one year for routine annual gynecologic exam or sooner if needed  We recommend follow up with a primary care physician for any chronic medical problems or non-gynecologic concerns    We discussed calcium/vitamin D/weight bearing exercise and osteoporosis prevention  Handouts were given to the patient   We discussed typical perimenopausal bleeding patterns. I recommend that she notify MD for chaotic or symptomatic bleeding, or bleeding in between menses or intermenstrual bleeding for additional evaluation. Trial of NSAIDs for heavier cycles  Disc other options, pt prefers to observe  Keep endo fu prn  Folllow up:  [x] return for annual well woman exam in one year or sooner if she is having problems  [] follow up and ultrasound  [x] mammogram  [] 6 months  [] 3 months  [] 1 month    No orders of the defined types were placed in this encounter.       Results for orders placed or performed in visit on 02/22/17   [facility] MAMMO BI SCREENING INCL CAD    Narrative    STUDY:  Bilateral Digital Screening Mammogram    INDICATION:  Screening    COMPARISON:  Prior studies dating back to 2008    BREAST COMPOSITION: There are scattered areas of fibroglandular density. FINDINGS: Bilateral digital screening mammography was performed, and is  interpreted in conjunction with a computer assisted detection (CAD) system. No  suspicious masses or calcifications are identified. There is no skin thickening  or nipple retraction. There has been no significant change. Impression    IMPRESSION:    BIRADS 1: Negative. No mammographic evidence of malignancy. Next screening mammogram is recommended in one year. The patient will be  notified of these results.

## 2023-02-15 NOTE — ED PROVIDER NOTE - NS ED ATTENDING STATEMENT MOD
This was a shared visit with the RUSSEL. I reviewed and verified the documentation and independently performed the documented:

## 2023-02-15 NOTE — ED PROVIDER NOTE - PHYSICAL EXAMINATION
GENERAL: Well-nourished, Well-developed. NAD.  HEAD: No visible or palpable bumps or hematomas. No ecchymosis behind ears B/L.  Eyes: PERRLA, EOMI. No asymmetry. No nystagmus. No conjunctival injection. Non-icteric sclera.  ENMT: MMM.   Neck: Supple. FROM  CVS: No reproducible chest wall tenderness. Normal S1,S2. No murmurs appreciated on auscultation   RESP: No use of accessory muscles. Chest rise symmetrical with good expansion. Lungs clear to auscultation B/L. No wheezing, rales, or rhonchi auscultated.  GI: Normal auscultation of bowel sounds in all 4 quadrants. Soft, Nontender, Nondistended. No guarding or rebound tenderness. No CVAT B/L.  Skin: Warm, Dry. No rashes or lesions. Good cap refill < 2 sec B/L.  EXT: Radial and pedal pulses present B/L. No calf tenderness or swelling B/L. No palpable cords. No pedal edema B/L.  Neuro: AA&O x 3. Sensation grossly intact. Strength 5/5 B/L. Gait within normal limits.

## 2023-02-15 NOTE — ED ADULT NURSE NOTE - NSFALLRSKUNASSIST_ED_ALL_ED
Chief complaint of intermittent palpitations since 11pm last night. No fever, no coughing, no chest pain or shortness of breath. Pt currently feels better. no

## 2023-02-15 NOTE — ED ADULT NURSE NOTE - DOES PATIENT HAVE ADVANCE DIRECTIVE
Sara Bennett contacted at [facility] re: continuation of Ampicillin and Fortaz. Orders received to continue both. No

## 2023-02-15 NOTE — ED PROVIDER NOTE - NSFOLLOWUPINSTRUCTIONS_ED_ALL_ED_FT
**Follow up with your primary care doctor within 1-3 days**    Chest Pain    Chest pain can be caused by many different conditions which may or may not be dangerous. Causes include heartburn, lung infections, heart attack, blood clot in lungs, skin infections, strain or damage to muscle, cartilage, or bones, etc. In addition to a history and physical examination, an electrocardiogram (ECG) or other lab tests may have been performed to determine the cause of your chest pain. Follow up with your primary care provider or with a cardiologist as instructed.     SEEK IMMEDIATE MEDICAL CARE IF YOU HAVE ANY OF THE FOLLOWING SYMPTOMS: worsening chest pain, coughing up blood, unexplained back/neck/jaw pain, severe abdominal pain, dizziness or lightheadedness, fainting, shortness of breath, sweaty or clammy skin, vomiting, or racing heart beat. These symptoms may represent a serious problem that is an emergency. Do not wait to see if the symptoms will go away. Get medical help right away. Call 911 and do not drive yourself to the hospital.    Headache    A headache is pain or discomfort felt around the head or neck area. The specific cause of a headache may not be found as there are many types including tension headaches, migraine headaches, and cluster headaches. Watch your condition for any changes. Things you can do to manage your pain include taking over the counter and prescription medications as instructed by your health care provider, lying down in a dark quiet room, limiting stress, getting regular sleep, and refraining from alcohol and tobacco products.    SEEK IMMEDIATE MEDICAL CARE IF YOU HAVE ANY OF THE FOLLOWING SYMPTOMS: fever, vomiting, stiff neck, loss of vision, problems with speech, muscle weakness, loss of balance, trouble walking, passing out, or confusion.

## 2023-02-15 NOTE — ED PROVIDER NOTE - DIFFERENTIAL DIAGNOSIS
cardiac ischemia, cardiac arrhythmia, acute coronary syndromes, symptomatic anemia, electrolyte abnormalities including hyponatremia and hypokalemia, and pneumothorax. Pulmonary embolism and aotric dissection are less likely.  ICH Differential Diagnosis

## 2023-02-15 NOTE — ED PROVIDER NOTE - OBJECTIVE STATEMENT
28-year-old female past medical history iron deficiency anemia presenting to the ED for evaluation of cough, headache, chest pain, shortness of breath x2 days.  Patient reports yesterday developed cough and mild, dull, bilateral frontal headache.  Patient reports today went to urgent care for evaluation, at that time endorsed chest pain and shortness of breath and was sent to the ED for further evaluation.  Patient reports chest pain is better when laying down flat, worsened with deep inspiration.  Denies fever, chills, lower extremity swelling, calf pain, abdominal pain, nausea, vomiting

## 2023-02-15 NOTE — ED ADULT NURSE NOTE - OBJECTIVE STATEMENT
27 y/o female alert oriented x 3 presented to ed for chest pain associated with sob x yesterday . Pt complains of cough and dizziness . Pt able to speak in complete sentences in no apparent distress

## 2023-02-15 NOTE — ED PROVIDER NOTE - PATIENT PORTAL LINK FT
You can access the FollowMyHealth Patient Portal offered by [facility] by registering at the following website: [email or web address]. By joining [employer]’s FollowMyHealth portal, you will also be able to view your health information using other applications (apps) compatible with our system.

## 2023-02-15 NOTE — ED PROVIDER NOTE - ATTENDING APP SHARED VISIT CONTRIBUTION OF CARE
Patient is c/o chest pain, associated with palpitations. Chest pain is worse with deep breathing. Denies risk factors for PE/DVT/aortic dissection. Patient states that, from all the stress she has from her chest pain related worry, started having headache while in ED, gradual onset, moderate pain, generalized headache. Denies neck pain.. Denies trauma. Denies risk factors for SAH. This is not the worst HA of patient life. Denies domestic violence.   Vitals reviewed.   Patient is awake, alert, answering questions appropriately, appears comfortable and not in any distress.  Lungs: CTA, no wheezing, no crackles.  Heart: s1, s2, rrr, no M/G.  Abd: +BS, NT, ND, soft, no rebound, no guarding. No CVA tenderness, no rash.  CNS: awake, alert, o x 3, no focal neurologic deficits.  A/P: Chest pain,   headache,   labs, EKG, CXR,   CT, reevaluation.

## 2023-02-15 NOTE — ED PROVIDER NOTE - CLINICAL SUMMARY MEDICAL DECISION MAKING FREE TEXT BOX
EKG reviewed by me and shows sinus tachycardia at rate of 107; no ST elevations or depression. No significant T wave changes noted.   Chest X-rays reviewed by me, and shows no actue findings. No Pneumothorax, no free air, no effusions, and these findings discussed with patient.  Laboratory results reviewed and discussed with patient. CBC shows normal WBC count, Hb/Hct and plateelet count are WNL. BMP shows electrolytes WNL and no ASHER.   Pregnant test is negative. Troponin is negative.  patient remained stable in ED, improved well, results of the diagnostic studies reviewed and discussed with patient, Patient remained awake, alert, ambulatory and comfortable, tolerated PO. Discussed with patient in detail about the need for close outpatient follow up and the need to return to ED for any persistent, or worsening symptoms, for any new symptoms/concerns. patient verbalized understanding and agreed. patient is given detail aftercare instructions and is instructed well to f/u as outpatient for further care.

## 2023-02-16 VITALS
OXYGEN SATURATION: 100 % | SYSTOLIC BLOOD PRESSURE: 111 MMHG | DIASTOLIC BLOOD PRESSURE: 55 MMHG | RESPIRATION RATE: 19 BRPM | TEMPERATURE: 98 F | HEART RATE: 95 BPM

## 2023-02-16 PROCEDURE — 70450 CT HEAD/BRAIN W/O DYE: CPT | Mod: 26,MA

## 2023-02-16 RX ORDER — SODIUM CHLORIDE 9 MG/ML
1000 INJECTION INTRAMUSCULAR; INTRAVENOUS; SUBCUTANEOUS ONCE
Refills: 0 | Status: COMPLETED | OUTPATIENT
Start: 2023-02-16 | End: 2023-02-16

## 2023-02-16 RX ADMIN — SODIUM CHLORIDE 1000 MILLILITER(S): 9 INJECTION INTRAMUSCULAR; INTRAVENOUS; SUBCUTANEOUS at 01:06

## 2023-07-24 NOTE — ED ADULT NURSE NOTE - IN THE PAST 12 MONTHS HAVE YOU USED DRUGS OTHER THAN THOSE REQUIRED FOR MEDICAL REASON?
Atypical Antidepressants Protocol [id number]/2023 11:25 AM    PHQ-2 or PHQ9 on record within past 12 months     
No

## 2023-10-16 NOTE — ED ADULT NURSE NOTE - NSSEPSISSUSPECTED_ED_A_ED
[facility].[place], [address]  Tel.  [phone]  Fax. [address] [facility], [address] [place] [address]  Tel.  [phone]  Fax. [phone] [facility] [address] [facility]  Tel.  [phone]  Fax. [address] is seen today to receive a WatchPAT home sleep apnea testing (HSAT) device. The WatchPAT HSAT Agreement was reviewed and endorsed by patient. General information regarding operation of the HSAT device was provided. Patient was advised to watch the [facility] instructional video. Patient was advised to contact patient support for any problems using the device. Patient was advised to complete the Morning Questionnaire after awakening/ending the test.    There were no vitals taken for this visit. Patient will return the HSAT device by noon unless otherwise approved. Patient will be contacted once the results have been reviewed by the physician, typically within 10-15 business days.     [employer] Serial Number [id number] No

## 2023-11-12 NOTE — ED PROVIDER NOTE - WORK/EXCUSE FORM DATE
eMERGENCY dEPARTMENT eNCOUnter      CHIEF COMPLAINT    Chief Complaint   Patient presents with   • Eye Problem       HPI    Valerie Ordaz is a 89 year old female who presents for evaluation of double vision for the past 6 weeks or so, on and off, both on distant vision and close up, going away when she closes one of her eyes.  Denies trauma, denies any other findings, no weakness numbness tingling of extremities no headache, she was seen by an optometrist few days ago who noticed some kind of hemorrhage behind her left eye  which has been bothering her the most, I do not have access to those records, and family is not able to provide any details of that exam but patient was referred to an ophthalmologist early next week.  Patient has history of hypertension and peripheral neuropathy but she is not a diabetic.  Today patient was rubbing her left eye and developed bruising below her left eye, she is not on any blood thinners.    ALLERGIES    ALLERGIES:  No Known Allergies    CURRENT MEDICATIONS    No current facility-administered medications for this encounter.     Current Outpatient Medications   Medication Sig Dispense Refill   • gabapentin (NEURONTIN) 100 MG capsule TAKE 2 CAPSULES TWICE DAILY, MAY TAKE ADDITIONAL 1-2 CAPSULES AS NEEDED IN AFTERNOON     • AMIODarone (PACERONE) 200 MG tablet Take 100 mg by mouth daily.     • pantoprazole (PROTONIX) 40 MG tablet Take 40 mg by mouth daily.     • busPIRone (BUSPAR) 5 MG tablet Take 5 mg by mouth in the morning and 5 mg in the evening.     • levothyroxine 75 MCG tablet Take 75 mcg by mouth daily.     • amLODIPine (NORVASC) 5 MG tablet Take 5 mg by mouth daily.     • Ascorbic Acid (vitamin C) 500 MG tablet Take 500 mg by mouth daily.     • cholecalciferol (VITAMIN D) 25 mcg (1,000 units) tablet Take 1,000 Units by mouth daily.     • losartan (COZAAR) 100 MG tablet Take 100 mg by mouth daily.     • aspirin (ECOTRIN) 81 MG EC tablet Take 81 mg by mouth daily.     •  simethicone (MYLICON) 125 MG chewable tablet Chew 125 mg by mouth every 6 hours as needed for Flatulence.         PAST MEDICAL HISTORY    Past Medical History:   Diagnosis Date   • Cancer of right colon (CMD)    • Hypertension    • Thyroid disease        SURGICAL HISTORY    Past Surgical History:   Procedure Laterality Date   • Appendectomy     • Hemicolectomy  12/2022    ileocolic, right colic artery removed, lymphadenectomy   • Thyroidectomy     • Total hip replacement Bilateral    • Total shoulder arthroplasty Bilateral    • Vaginal hysterectomy         SOCIAL HISTORY    Social History     Tobacco Use   • Smoking status: Never   • Smokeless tobacco: Never   Substance Use Topics   • Alcohol use: Yes   • Drug use: Never       FAMILY HISTORY    Family History   Problem Relation Age of Onset   • Cancer, Esophageal Mother    • Cancer Sister        REVIEW OF SYSTEMS    Constitutional:  Denies fever or chills.   Eyes:  See HPI  HENT:  Denies nasal congestion or sore throat.   Respiratory:  Denies cough or shortness of breath.   Cardiovascular:  Denies chest pain or edema.   GI:  Denies abdominal pain, nausea, vomiting, bloody stools or diarrhea.   :  Denies dysuria.   Musculoskeletal:  Denies back pain or joint pain.   Integument:  Denies rash.   Neurologic:  Denies headache, focal weakness or sensory changes.   Psychiatric:  Denies depression or anxiety.     PHYSICAL EXAM    ED Triage Vitals [11/11/23 1747]   BP (!) 179/85   Heart Rate 85   Resp 18   Temp 98.2 °F (36.8 °C)   SpO2 98 %     Constitutional:  Well developed, no acute distress,   Eyes:  PERRL, EOMI, no visual field deficits by comparison, she does have double vision, mostly horizontal, both distant and close up, pupils are reactive to light and are equal in size.  Fundi difficult to visualize  Head: Atraumatic, normocephalic  ENT:  oropharynx moist, there is ecchymosis below left eye  Neck:  normal range of motion,   Respiratory:  No respiratory distress,  normal breath sounds,   Cardiovascular:  Normal rate, normal rhythm,  GI:  Soft, nondistended,    Musculoskeletal:  Neck: normal ROM   Extremities:  No deformities in extremities x 4  Integument:  Pale and dry  Neurologic:  Alert & oriented x 3;  Strength 5/5 in extremities x 4.   Psychiatric:  Speech and behavior appropriate.       RADIOLOGY    MRI BRAIN WO CONTRAST   Final Result   IMPRESSION:   No acute appearing intracranial abnormality.      Electronically Signed by: Rafa Reid MD   Signed on: 11/11/2023 9:57 PM   Created on Workstation ID: [id number]   Signed on Workstation ID: [id number]      CT HEAD WO CONTRAST   Final Result   IMPRESSION:        No acute intracranial abnormality on this non-contrast CT.   If clinically indicated, MRI with DWI would offer greater sensitivity for   the detection of any acute ischemia.      Electronically Signed by: Luis Bae MD   Signed on: 11/11/2023 7:14 PM   Created on Workstation ID: [id number]   Signed on Workstation ID: [id number]        Images personally reviewed and interpreted by me, no acute intracranial process, awaiting radiology report    LABS    Results for orders placed or performed during the hospital encounter of 11/11/23   Comprehensive Metabolic Panel   Result Value    Fasting Status     Sodium 131 (L)    Potassium 4.7     Comment: Slight hemolysis, result may be falsely increased.      Chloride 99    Carbon Dioxide 28    Anion Gap 9    Glucose 89    BUN 44 (H)    Creatinine 0.97 (H)    Glomerular Filtration Rate 54 (L)     Comment: eGFR 30-59 mL/min/1.73m2 = Moderate decrease in kidney function. Stage 3 CKD (chronic kidney disease) or moderate kidney disease. Estimated GFR calculated using the CKD-EPI-R (2021) equation that does not include race in the creatinine calculation.    BUN/Cr 45 (H)    Calcium 8.9    Bilirubin, Total 0.4    GOT/AST 24    GPT/ALT 14    Alkaline Phosphatase 82    Albumin 3.2 (L)    Protein, Total 6.9    Globulin 3.7    A/G Ratio  0.9 (L)   Partial Thromboplastin Time (PTT)   Result Value    PTT 26   Prothrombin Time (INR/PT)   Result Value    Protime- PT 10.7    INR 1.0     Comment: INR Therapeutic Range: 2.0 to 3.0 (2.5 to 3.5 recommended for recurrent thrombotic episodes and mechanical prosthetic heart valves.)   Sedimentation Rate   Result Value    RBC Sedimentation Rate 16   CBC with Automated Differential (performable only)   Result Value    WBC 5.5    RBC 3.88 (L)    HGB 13.0    HCT 37.4    MCV 96.4    MCH 33.5    MCHC 34.8    RDW-CV 12.4    RDW-SD 43.9        NRBC 0    Neutrophil, Percent 68    Lymphocytes, Percent 17    Mono, Percent 12    Eosinophils, Percent 3    Basophils, Percent 0    Immature Granulocytes 0    Absolute Neutrophils 3.7    Absolute Lymphocytes 0.9 (L)    Absolute Monocytes 0.6    Absolute Eosinophils  0.2    Absolute Basophils 0.0    Absolute Immature Granulocytes 0.0   Thyroid Stimulating Hormone Reflex   Result Value    TSH 1.804     Comment: Findings most consistent with euthyroid state, no additional testing suggested. TSH may be normal in patients with thyroid dysfunction and pituitary disease. Clinical correlation recommended.    (Reflex TSH algorithm is not recommended in hospitalized patients. A variety of drugs, as well as serious acute and chronic illnesses may alter thyroid function tests. Commonly implicated drugs include glucocorticoids, dopamine, carbamazepine, iodine, amiodarone, lithium and heparin.)         MEDS:  No current facility-administered medications for this encounter.     Current Outpatient Medications   Medication Sig   • gabapentin (NEURONTIN) 100 MG capsule TAKE 2 CAPSULES TWICE DAILY, MAY TAKE ADDITIONAL 1-2 CAPSULES AS NEEDED IN AFTERNOON   • AMIODarone (PACERONE) 200 MG tablet Take 100 mg by mouth daily.   • pantoprazole (PROTONIX) 40 MG tablet Take 40 mg by mouth daily.   • busPIRone (BUSPAR) 5 MG tablet Take 5 mg by mouth in the morning and 5 mg in the evening.   •  levothyroxine 75 MCG tablet Take 75 mcg by mouth daily.   • amLODIPine (NORVASC) 5 MG tablet Take 5 mg by mouth daily.   • Ascorbic Acid (vitamin C) 500 MG tablet Take 500 mg by mouth daily.   • cholecalciferol (VITAMIN D) 25 mcg (1,000 units) tablet Take 1,000 Units by mouth daily.   • losartan (COZAAR) 100 MG tablet Take 100 mg by mouth daily.   • aspirin (ECOTRIN) 81 MG EC tablet Take 81 mg by mouth daily.   • simethicone (MYLICON) 125 MG chewable tablet Chew 125 mg by mouth every 6 hours as needed for Flatulence.       ED COURSE & MEDICAL DECISION MAKING      I have reviewed the patient's past medical/surgical/social and family history.     Patient has had double vision for the past 6 weeks, which goes away with closing one of her eyes, horizontal, had initial eye exam by optometrist and referred to ophthalmologist next week.    No other neurological deficits, CT and MRI of the brain negative for acute findings.    Labs reviewed at patient's baseline, with normal sedimentation rate, normal TSH.    Discussed with Dr. Roblero, neurology, patient will follow-up closely with Ophthalmology         Summary of your Discharge Medications      You have not been prescribed any medications.         FINAL IMPRESSION      Left eye visual disturbance       Rio Bellamy MD  11/11/23 9102     13-Feb-2020

## 2023-12-31 ENCOUNTER — EMERGENCY (EMERGENCY)
Facility: HOSPITAL | Age: 28
LOS: 0 days | Discharge: HOME | End: 2023-12-31
Attending: STUDENT IN AN ORGANIZED HEALTH CARE EDUCATION/TRAINING PROGRAM
Payer: COMMERCIAL

## 2023-12-31 VITALS
SYSTOLIC BLOOD PRESSURE: 152 MMHG | WEIGHT: 250 LBS | HEART RATE: 66 BPM | TEMPERATURE: 98 F | DIASTOLIC BLOOD PRESSURE: 102 MMHG | OXYGEN SATURATION: 97 % | RESPIRATION RATE: 17 BRPM

## 2023-12-31 DIAGNOSIS — Z90.49 ACQUIRED ABSENCE OF OTHER SPECIFIED PARTS OF DIGESTIVE TRACT: Chronic | ICD-10-CM

## 2023-12-31 DIAGNOSIS — H92.01 OTALGIA, RIGHT EAR: ICD-10-CM

## 2023-12-31 DIAGNOSIS — Z98.890 OTHER SPECIFIED POSTPROCEDURAL STATES: Chronic | ICD-10-CM

## 2023-12-31 DIAGNOSIS — H66.91 OTITIS MEDIA, UNSPECIFIED, RIGHT EAR: ICD-10-CM

## 2023-12-31 LAB
ALBUMIN SERPL ELPH-MCNC: 4.4 G/DL — SIGNIFICANT CHANGE UP (ref 3.5–5.2)
ALBUMIN SERPL ELPH-MCNC: 4.4 G/DL — SIGNIFICANT CHANGE UP (ref 3.5–5.2)
ALP SERPL-CCNC: 85 U/L — SIGNIFICANT CHANGE UP (ref 30–115)
ALP SERPL-CCNC: 85 U/L — SIGNIFICANT CHANGE UP (ref 30–115)
ALT FLD-CCNC: 121 U/L — HIGH (ref 0–41)
ALT FLD-CCNC: 121 U/L — HIGH (ref 0–41)
ANION GAP SERPL CALC-SCNC: 11 MMOL/L — SIGNIFICANT CHANGE UP (ref 7–14)
ANION GAP SERPL CALC-SCNC: 11 MMOL/L — SIGNIFICANT CHANGE UP (ref 7–14)
AST SERPL-CCNC: 66 U/L — HIGH (ref 0–41)
AST SERPL-CCNC: 66 U/L — HIGH (ref 0–41)
BASOPHILS # BLD AUTO: 0.04 K/UL — SIGNIFICANT CHANGE UP (ref 0–0.2)
BASOPHILS # BLD AUTO: 0.04 K/UL — SIGNIFICANT CHANGE UP (ref 0–0.2)
BASOPHILS NFR BLD AUTO: 0.3 % — SIGNIFICANT CHANGE UP (ref 0–1)
BASOPHILS NFR BLD AUTO: 0.3 % — SIGNIFICANT CHANGE UP (ref 0–1)
BILIRUB SERPL-MCNC: 0.3 MG/DL — SIGNIFICANT CHANGE UP (ref 0.2–1.2)
BILIRUB SERPL-MCNC: 0.3 MG/DL — SIGNIFICANT CHANGE UP (ref 0.2–1.2)
BUN SERPL-MCNC: 11 MG/DL — SIGNIFICANT CHANGE UP (ref 10–20)
BUN SERPL-MCNC: 11 MG/DL — SIGNIFICANT CHANGE UP (ref 10–20)
CALCIUM SERPL-MCNC: 8.9 MG/DL — SIGNIFICANT CHANGE UP (ref 8.4–10.4)
CALCIUM SERPL-MCNC: 8.9 MG/DL — SIGNIFICANT CHANGE UP (ref 8.4–10.4)
CHLORIDE SERPL-SCNC: 105 MMOL/L — SIGNIFICANT CHANGE UP (ref 98–110)
CHLORIDE SERPL-SCNC: 105 MMOL/L — SIGNIFICANT CHANGE UP (ref 98–110)
CO2 SERPL-SCNC: 26 MMOL/L — SIGNIFICANT CHANGE UP (ref 17–32)
CO2 SERPL-SCNC: 26 MMOL/L — SIGNIFICANT CHANGE UP (ref 17–32)
CREAT SERPL-MCNC: 0.6 MG/DL — LOW (ref 0.7–1.5)
CREAT SERPL-MCNC: 0.6 MG/DL — LOW (ref 0.7–1.5)
EGFR: 124 ML/MIN/1.73M2 — SIGNIFICANT CHANGE UP
EGFR: 124 ML/MIN/1.73M2 — SIGNIFICANT CHANGE UP
EOSINOPHIL # BLD AUTO: 0.1 K/UL — SIGNIFICANT CHANGE UP (ref 0–0.7)
EOSINOPHIL # BLD AUTO: 0.1 K/UL — SIGNIFICANT CHANGE UP (ref 0–0.7)
EOSINOPHIL NFR BLD AUTO: 0.8 % — SIGNIFICANT CHANGE UP (ref 0–8)
EOSINOPHIL NFR BLD AUTO: 0.8 % — SIGNIFICANT CHANGE UP (ref 0–8)
GLUCOSE SERPL-MCNC: 108 MG/DL — HIGH (ref 70–99)
GLUCOSE SERPL-MCNC: 108 MG/DL — HIGH (ref 70–99)
HCG SERPL QL: NEGATIVE — SIGNIFICANT CHANGE UP
HCG SERPL QL: NEGATIVE — SIGNIFICANT CHANGE UP
HCT VFR BLD CALC: 41.8 % — SIGNIFICANT CHANGE UP (ref 37–47)
HCT VFR BLD CALC: 41.8 % — SIGNIFICANT CHANGE UP (ref 37–47)
HGB BLD-MCNC: 14.2 G/DL — SIGNIFICANT CHANGE UP (ref 12–16)
HGB BLD-MCNC: 14.2 G/DL — SIGNIFICANT CHANGE UP (ref 12–16)
IMM GRANULOCYTES NFR BLD AUTO: 0.5 % — HIGH (ref 0.1–0.3)
IMM GRANULOCYTES NFR BLD AUTO: 0.5 % — HIGH (ref 0.1–0.3)
LYMPHOCYTES # BLD AUTO: 32.3 % — SIGNIFICANT CHANGE UP (ref 20.5–51.1)
LYMPHOCYTES # BLD AUTO: 32.3 % — SIGNIFICANT CHANGE UP (ref 20.5–51.1)
LYMPHOCYTES # BLD AUTO: 4.12 K/UL — HIGH (ref 1.2–3.4)
LYMPHOCYTES # BLD AUTO: 4.12 K/UL — HIGH (ref 1.2–3.4)
MCHC RBC-ENTMCNC: 29.1 PG — SIGNIFICANT CHANGE UP (ref 27–31)
MCHC RBC-ENTMCNC: 29.1 PG — SIGNIFICANT CHANGE UP (ref 27–31)
MCHC RBC-ENTMCNC: 34 G/DL — SIGNIFICANT CHANGE UP (ref 32–37)
MCHC RBC-ENTMCNC: 34 G/DL — SIGNIFICANT CHANGE UP (ref 32–37)
MCV RBC AUTO: 85.7 FL — SIGNIFICANT CHANGE UP (ref 81–99)
MCV RBC AUTO: 85.7 FL — SIGNIFICANT CHANGE UP (ref 81–99)
MONOCYTES # BLD AUTO: 0.95 K/UL — HIGH (ref 0.1–0.6)
MONOCYTES # BLD AUTO: 0.95 K/UL — HIGH (ref 0.1–0.6)
MONOCYTES NFR BLD AUTO: 7.4 % — SIGNIFICANT CHANGE UP (ref 1.7–9.3)
MONOCYTES NFR BLD AUTO: 7.4 % — SIGNIFICANT CHANGE UP (ref 1.7–9.3)
NEUTROPHILS # BLD AUTO: 7.48 K/UL — HIGH (ref 1.4–6.5)
NEUTROPHILS # BLD AUTO: 7.48 K/UL — HIGH (ref 1.4–6.5)
NEUTROPHILS NFR BLD AUTO: 58.7 % — SIGNIFICANT CHANGE UP (ref 42.2–75.2)
NEUTROPHILS NFR BLD AUTO: 58.7 % — SIGNIFICANT CHANGE UP (ref 42.2–75.2)
NRBC # BLD: 0 /100 WBCS — SIGNIFICANT CHANGE UP (ref 0–0)
NRBC # BLD: 0 /100 WBCS — SIGNIFICANT CHANGE UP (ref 0–0)
PLATELET # BLD AUTO: 225 K/UL — SIGNIFICANT CHANGE UP (ref 130–400)
PLATELET # BLD AUTO: 225 K/UL — SIGNIFICANT CHANGE UP (ref 130–400)
PMV BLD: 11.6 FL — HIGH (ref 7.4–10.4)
PMV BLD: 11.6 FL — HIGH (ref 7.4–10.4)
POTASSIUM SERPL-MCNC: 4.5 MMOL/L — SIGNIFICANT CHANGE UP (ref 3.5–5)
POTASSIUM SERPL-MCNC: 4.5 MMOL/L — SIGNIFICANT CHANGE UP (ref 3.5–5)
POTASSIUM SERPL-SCNC: 4.5 MMOL/L — SIGNIFICANT CHANGE UP (ref 3.5–5)
POTASSIUM SERPL-SCNC: 4.5 MMOL/L — SIGNIFICANT CHANGE UP (ref 3.5–5)
PROT SERPL-MCNC: 7.2 G/DL — SIGNIFICANT CHANGE UP (ref 6–8)
PROT SERPL-MCNC: 7.2 G/DL — SIGNIFICANT CHANGE UP (ref 6–8)
RBC # BLD: 4.88 M/UL — SIGNIFICANT CHANGE UP (ref 4.2–5.4)
RBC # BLD: 4.88 M/UL — SIGNIFICANT CHANGE UP (ref 4.2–5.4)
RBC # FLD: 14.5 % — SIGNIFICANT CHANGE UP (ref 11.5–14.5)
RBC # FLD: 14.5 % — SIGNIFICANT CHANGE UP (ref 11.5–14.5)
SODIUM SERPL-SCNC: 142 MMOL/L — SIGNIFICANT CHANGE UP (ref 135–146)
SODIUM SERPL-SCNC: 142 MMOL/L — SIGNIFICANT CHANGE UP (ref 135–146)
WBC # BLD: 12.76 K/UL — HIGH (ref 4.8–10.8)
WBC # BLD: 12.76 K/UL — HIGH (ref 4.8–10.8)
WBC # FLD AUTO: 12.76 K/UL — HIGH (ref 4.8–10.8)
WBC # FLD AUTO: 12.76 K/UL — HIGH (ref 4.8–10.8)

## 2023-12-31 PROCEDURE — 99285 EMERGENCY DEPT VISIT HI MDM: CPT

## 2023-12-31 PROCEDURE — 80053 COMPREHEN METABOLIC PANEL: CPT

## 2023-12-31 PROCEDURE — 84703 CHORIONIC GONADOTROPIN ASSAY: CPT

## 2023-12-31 PROCEDURE — 70481 CT ORBIT/EAR/FOSSA W/DYE: CPT | Mod: 26,MA

## 2023-12-31 PROCEDURE — 70481 CT ORBIT/EAR/FOSSA W/DYE: CPT | Mod: MA

## 2023-12-31 PROCEDURE — 99284 EMERGENCY DEPT VISIT MOD MDM: CPT | Mod: 25

## 2023-12-31 PROCEDURE — 36415 COLL VENOUS BLD VENIPUNCTURE: CPT

## 2023-12-31 PROCEDURE — 85025 COMPLETE CBC W/AUTO DIFF WBC: CPT

## 2023-12-31 RX ORDER — ACETAMINOPHEN 500 MG
650 TABLET ORAL ONCE
Refills: 0 | Status: COMPLETED | OUTPATIENT
Start: 2023-12-31 | End: 2023-12-31

## 2023-12-31 RX ADMIN — Medication 650 MILLIGRAM(S): at 10:25

## 2023-12-31 NOTE — ED PROVIDER NOTE - CLINICAL SUMMARY MEDICAL DECISION MAKING FREE TEXT BOX
I, Jace White, have personally seen and examined this patient. I have fully participated in the care of this patient. I have made amendments to the documentation where appropriate and otherwise agree with the history, physical exam, and plan as documented by the PA    Patient here for right-sided ear pain mastoid tenderness on exam CT shows no mastoiditis will discharge with trial of oral antibiotics    Appropriate medications for patient's presenting complaints were ordered and effects were reassessed..    Escalation to admission and/or observation was considered.  Patient feels much better and is comfortable with discharge.  Appropriate follow-up was arranged.

## 2023-12-31 NOTE — ED PROVIDER NOTE - PATIENT PORTAL LINK FT
You can access the FollowMyHealth Patient Portal offered by [facility] by registering at the following website: [email or web address]. By joining [employer]’s FollowMyHealth portal, you will also be able to view your health information using other applications (apps) compatible with our system. You can access the FollowMyHealth Patient Portal offered by [facility] by registering at the following website: [email or web address]. By joining [employer]’s FollowMyHealth portal, you will also be able to view your health information using other applications (apps) compatible with our system.

## 2023-12-31 NOTE — ED PROVIDER NOTE - PHYSICAL EXAMINATION
CONST: Well appearing in NAD  EYES: PERRL, EOMI, Sclera and conjunctiva clear.   ENT: No nasal discharge. right tm erythematous, Oropharynx normal appearing, no erythema or exudates. No abscess or swelling. Uvula midline. Right mastoid tenderness.  NECK: Non-tender, no meningeal signs. normal ROM. supple   CARD: Normal S1 S2; Normal rate and rhythm  RESP: Equal BS B/L, No wheezes, rhonchi or rales. No distress  SKIN: Warm, dry, no acute rashes. Good turgor

## 2023-12-31 NOTE — ED PROVIDER NOTE - OBJECTIVE STATEMENT
28 year old female with no pmhx presents to ed with right ear pain x 2 weeks. Pt was diagnosed with ear infection, started on axbx however only took 4 days worth. no cough, congestion, sore throat, fever or chills.

## 2024-03-19 ENCOUNTER — LABORATORY RESULT (OUTPATIENT)
Age: 29
End: 2024-03-19

## 2024-03-20 ENCOUNTER — OUTPATIENT (OUTPATIENT)
Dept: OUTPATIENT SERVICES | Facility: HOSPITAL | Age: 29
LOS: 1 days | End: 2024-03-20
Payer: COMMERCIAL

## 2024-03-20 ENCOUNTER — APPOINTMENT (OUTPATIENT)
Dept: OBGYN | Facility: CLINIC | Age: 29
End: 2024-03-20
Payer: COMMERCIAL

## 2024-03-20 VITALS
DIASTOLIC BLOOD PRESSURE: 85 MMHG | SYSTOLIC BLOOD PRESSURE: 127 MMHG | HEIGHT: 65 IN | WEIGHT: 260 LBS | BODY MASS INDEX: 43.32 KG/M2

## 2024-03-20 DIAGNOSIS — Z34.90 ENCOUNTER FOR SUPERVISION OF NORMAL PREGNANCY, UNSPECIFIED, UNSPECIFIED TRIMESTER: ICD-10-CM

## 2024-03-20 DIAGNOSIS — Z98.890 OTHER SPECIFIED POSTPROCEDURAL STATES: Chronic | ICD-10-CM

## 2024-03-20 DIAGNOSIS — Z90.49 ACQUIRED ABSENCE OF OTHER SPECIFIED PARTS OF DIGESTIVE TRACT: Chronic | ICD-10-CM

## 2024-03-20 PROCEDURE — 99204 OFFICE O/P NEW MOD 45 MIN: CPT | Mod: 25

## 2024-03-20 PROCEDURE — 81002 URINALYSIS NONAUTO W/O SCOPE: CPT

## 2024-03-20 PROCEDURE — 76815 OB US LIMITED FETUS(S): CPT

## 2024-03-20 PROCEDURE — 87481 CANDIDA DNA AMP PROBE: CPT

## 2024-03-20 PROCEDURE — 87625 HPV TYPES 16 & 18 ONLY: CPT

## 2024-03-20 PROCEDURE — 81513 NFCT DS BV RNA VAG FLU ALG: CPT

## 2024-03-20 PROCEDURE — 88142 CYTOPATH C/V THIN LAYER: CPT

## 2024-03-20 PROCEDURE — 87591 N.GONORRHOEAE DNA AMP PROB: CPT

## 2024-03-20 PROCEDURE — 87491 CHLMYD TRACH DNA AMP PROBE: CPT

## 2024-03-20 PROCEDURE — 76817 TRANSVAGINAL US OBSTETRIC: CPT | Mod: 26

## 2024-03-20 PROCEDURE — 87661 TRICHOMONAS VAGINALIS AMPLIF: CPT

## 2024-03-20 PROCEDURE — 99214 OFFICE O/P EST MOD 30 MIN: CPT | Mod: 25

## 2024-03-20 PROCEDURE — 87624 HPV HI-RISK TYP POOLED RSLT: CPT

## 2024-03-20 RX ORDER — ONDANSETRON 4 MG/1
4 TABLET ORAL EVERY 8 HOURS
Qty: 30 | Refills: 10 | Status: ACTIVE | COMMUNITY
Start: 2024-03-20 | End: 1900-01-01

## 2024-03-20 RX ORDER — ASPIRIN 81 MG/1
81 TABLET, CHEWABLE ORAL DAILY
Qty: 30 | Refills: 10 | Status: ACTIVE | COMMUNITY
Start: 2024-03-20 | End: 1900-01-01

## 2024-03-21 ENCOUNTER — OUTPATIENT (OUTPATIENT)
Dept: OUTPATIENT SERVICES | Facility: HOSPITAL | Age: 29
LOS: 1 days | End: 2024-03-21

## 2024-03-21 ENCOUNTER — NON-APPOINTMENT (OUTPATIENT)
Age: 29
End: 2024-03-21

## 2024-03-21 DIAGNOSIS — Z34.90 ENCOUNTER FOR SUPERVISION OF NORMAL PREGNANCY, UNSPECIFIED, UNSPECIFIED TRIMESTER: ICD-10-CM

## 2024-03-21 DIAGNOSIS — Z98.890 OTHER SPECIFIED POSTPROCEDURAL STATES: Chronic | ICD-10-CM

## 2024-03-21 DIAGNOSIS — Z90.49 ACQUIRED ABSENCE OF OTHER SPECIFIED PARTS OF DIGESTIVE TRACT: Chronic | ICD-10-CM

## 2024-03-22 ENCOUNTER — OUTPATIENT (OUTPATIENT)
Dept: OUTPATIENT SERVICES | Facility: HOSPITAL | Age: 29
LOS: 1 days | End: 2024-03-22

## 2024-03-22 ENCOUNTER — APPOINTMENT (OUTPATIENT)
Dept: OBGYN | Facility: CLINIC | Age: 29
End: 2024-03-22

## 2024-03-22 ENCOUNTER — NON-APPOINTMENT (OUTPATIENT)
Age: 29
End: 2024-03-22

## 2024-03-22 DIAGNOSIS — Z34.90 ENCOUNTER FOR SUPERVISION OF NORMAL PREGNANCY, UNSPECIFIED, UNSPECIFIED TRIMESTER: ICD-10-CM

## 2024-03-22 DIAGNOSIS — Z71.3 DIETARY COUNSELING AND SURVEILLANCE: ICD-10-CM

## 2024-03-22 LAB
BV BACTERIA RRNA VAG QL NAA+PROBE: NOT DETECTED
C GLABRATA RNA VAG QL NAA+PROBE: NOT DETECTED
C TRACH RRNA SPEC QL NAA+PROBE: NOT DETECTED
CANDIDA RRNA VAG QL PROBE: NOT DETECTED
N GONORRHOEA RRNA SPEC QL NAA+PROBE: NOT DETECTED
T VAGINALIS RRNA SPEC QL NAA+PROBE: NOT DETECTED

## 2024-04-01 LAB
CYTOLOGY CVX/VAG DOC THIN PREP: ABNORMAL
HPV HIGH+LOW RISK DNA PNL CVX: DETECTED

## 2024-04-03 ENCOUNTER — NON-APPOINTMENT (OUTPATIENT)
Age: 29
End: 2024-04-03

## 2024-04-05 ENCOUNTER — OUTPATIENT (OUTPATIENT)
Dept: OUTPATIENT SERVICES | Facility: HOSPITAL | Age: 29
LOS: 1 days | End: 2024-04-05
Payer: COMMERCIAL

## 2024-04-05 DIAGNOSIS — Z98.890 OTHER SPECIFIED POSTPROCEDURAL STATES: Chronic | ICD-10-CM

## 2024-04-05 DIAGNOSIS — Z90.49 ACQUIRED ABSENCE OF OTHER SPECIFIED PARTS OF DIGESTIVE TRACT: Chronic | ICD-10-CM

## 2024-04-05 PROCEDURE — 83036 HEMOGLOBIN GLYCOSYLATED A1C: CPT

## 2024-04-05 PROCEDURE — 86900 BLOOD TYPING SEROLOGIC ABO: CPT

## 2024-04-05 PROCEDURE — 87340 HEPATITIS B SURFACE AG IA: CPT

## 2024-04-05 PROCEDURE — 81420 FETAL CHRMOML ANEUPLOIDY: CPT

## 2024-04-05 PROCEDURE — 81220 CFTR GENE COM VARIANTS: CPT

## 2024-04-05 PROCEDURE — 87389 HIV-1 AG W/HIV-1&-2 AB AG IA: CPT

## 2024-04-05 PROCEDURE — 83655 ASSAY OF LEAD: CPT

## 2024-04-05 PROCEDURE — 81329 SMN1 GENE DOS/DELETION ALYS: CPT

## 2024-04-05 PROCEDURE — 86850 RBC ANTIBODY SCREEN: CPT

## 2024-04-05 PROCEDURE — 86787 VARICELLA-ZOSTER ANTIBODY: CPT

## 2024-04-05 PROCEDURE — 86762 RUBELLA ANTIBODY: CPT

## 2024-04-05 PROCEDURE — 87086 URINE CULTURE/COLONY COUNT: CPT

## 2024-04-05 PROCEDURE — 85027 COMPLETE CBC AUTOMATED: CPT

## 2024-04-05 PROCEDURE — 86780 TREPONEMA PALLIDUM: CPT

## 2024-04-05 PROCEDURE — 83020 HEMOGLOBIN ELECTROPHORESIS: CPT

## 2024-04-05 PROCEDURE — 83020 HEMOGLOBIN ELECTROPHORESIS: CPT | Mod: 26

## 2024-04-05 PROCEDURE — 87522 HEPATITIS C REVRS TRNSCRPJ: CPT

## 2024-04-05 PROCEDURE — 86765 RUBEOLA ANTIBODY: CPT

## 2024-04-09 LAB
ABO + RH PNL BLD: NORMAL
APPEARANCE: CLEAR
BACTERIA UR CULT: NORMAL
BILIRUBIN URINE: NEGATIVE
BLD GP AB SCN SERPL QL: NORMAL
BLOOD URINE: NEGATIVE
COLOR: YELLOW
ESTIMATED AVERAGE GLUCOSE: 120 MG/DL
GLUCOSE QUALITATIVE U: NEGATIVE MG/DL
HBA1C MFR BLD HPLC: 5.8 %
HBV SURFACE AG SER QL: NONREACTIVE
HCT VFR BLD CALC: 42.5 %
HCV RNA SERPL NAA+PROBE-LOG IU: NOT DETECTED LOGIU/ML
HEPC RNA INTERP: NOT DETECTED IU/ML
HGB A MFR BLD: 97.6 %
HGB A2 MFR BLD: 2.4 %
HGB BLD-MCNC: 13.6 G/DL
HGB FRACT BLD-IMP: NORMAL
HIV1+2 AB SPEC QL IA.RAPID: NONREACTIVE
KETONES URINE: NEGATIVE MG/DL
LEAD BLD-MCNC: <1 UG/DL
LEUKOCYTE ESTERASE URINE: NEGATIVE
MCHC RBC-ENTMCNC: 28.4 PG
MCHC RBC-ENTMCNC: 32 G/DL
MCV RBC AUTO: 88.7 FL
MEV IGG FLD QL IA: 221 AU/ML
MEV IGG+IGM SER-IMP: POSITIVE
NITRITE URINE: NEGATIVE
PH URINE: 6
PLATELET # BLD AUTO: 248 K/UL
PMV BLD AUTO: 0 /100 WBCS
PMV BLD: 11.2 FL
PROTEIN URINE: NEGATIVE MG/DL
RBC # BLD: 4.79 M/UL
RBC # FLD: 13.5 %
RUBV IGG FLD-ACNC: 1.6 INDEX
RUBV IGG SER-IMP: POSITIVE
SPECIFIC GRAVITY URINE: 1.03
T PALLIDUM AB SER QL IA: NEGATIVE
UROBILINOGEN URINE: 0.2 MG/DL
VZV AB TITR SER: POSITIVE
VZV IGG SER IF-ACNC: 229.9 INDEX
WBC # FLD AUTO: 10.5 K/UL

## 2024-04-12 LAB
AR GENE MUT ANL BLD/T: NORMAL
CHROMOSOME13 INTERPRETATION: NORMAL
CHROMOSOME13 TEST RESULT: NORMAL
CHROMOSOME18 INTERPRETATION: NORMAL
CHROMOSOME18 TEST RESULT: NORMAL
CHROMOSOME21 INTERPRETATION: NORMAL
CHROMOSOME21 TEST RESULT: NORMAL
FETAL FRACTION: NORMAL
PERFORMANCE AND LIMITATIONS: NORMAL
SEX CHROMOSOME INTERPRETATION: NORMAL
SEX CHROMOSOME TEST RESULT: NORMAL
VERIFI PRENATAL TEST: NOT DETECTED

## 2024-04-15 ENCOUNTER — NON-APPOINTMENT (OUTPATIENT)
Age: 29
End: 2024-04-15

## 2024-04-16 DIAGNOSIS — Z34.90 ENCOUNTER FOR SUPERVISION OF NORMAL PREGNANCY, UNSPECIFIED, UNSPECIFIED TRIMESTER: ICD-10-CM

## 2024-04-17 ENCOUNTER — APPOINTMENT (OUTPATIENT)
Dept: ANTEPARTUM | Facility: CLINIC | Age: 29
End: 2024-04-17
Payer: MEDICAID

## 2024-04-17 ENCOUNTER — APPOINTMENT (OUTPATIENT)
Dept: OBGYN | Facility: CLINIC | Age: 29
End: 2024-04-17
Payer: COMMERCIAL

## 2024-04-17 ENCOUNTER — OUTPATIENT (OUTPATIENT)
Dept: OUTPATIENT SERVICES | Facility: HOSPITAL | Age: 29
LOS: 1 days | End: 2024-04-17
Payer: MEDICAID

## 2024-04-17 ENCOUNTER — APPOINTMENT (OUTPATIENT)
Dept: OBGYN | Facility: CLINIC | Age: 29
End: 2024-04-17
Payer: MEDICAID

## 2024-04-17 ENCOUNTER — OUTPATIENT (OUTPATIENT)
Dept: OUTPATIENT SERVICES | Facility: HOSPITAL | Age: 29
LOS: 1 days | End: 2024-04-17
Payer: COMMERCIAL

## 2024-04-17 ENCOUNTER — ASOB RESULT (OUTPATIENT)
Age: 29
End: 2024-04-17

## 2024-04-17 VITALS
HEIGHT: 65 IN | WEIGHT: 260 LBS | DIASTOLIC BLOOD PRESSURE: 62 MMHG | SYSTOLIC BLOOD PRESSURE: 120 MMHG | BODY MASS INDEX: 43.32 KG/M2

## 2024-04-17 DIAGNOSIS — Z98.890 OTHER SPECIFIED POSTPROCEDURAL STATES: Chronic | ICD-10-CM

## 2024-04-17 DIAGNOSIS — Z34.90 ENCOUNTER FOR SUPERVISION OF NORMAL PREGNANCY, UNSPECIFIED, UNSPECIFIED TRIMESTER: ICD-10-CM

## 2024-04-17 DIAGNOSIS — Z90.49 ACQUIRED ABSENCE OF OTHER SPECIFIED PARTS OF DIGESTIVE TRACT: Chronic | ICD-10-CM

## 2024-04-17 DIAGNOSIS — O99.211 OBESITY COMPLICATING PREGNANCY, FIRST TRIMESTER: ICD-10-CM

## 2024-04-17 DIAGNOSIS — Z71.3 DIETARY COUNSELING AND SURVEILLANCE: ICD-10-CM

## 2024-04-17 DIAGNOSIS — Z36.82 ENCOUNTER FOR ANTENATAL SCREENING FOR NUCHAL TRANSLUCENCY: ICD-10-CM

## 2024-04-17 DIAGNOSIS — Z36.89 ENCOUNTER FOR OTHER SPECIFIED ANTENATAL SCREENING: ICD-10-CM

## 2024-04-17 DIAGNOSIS — Z3A.12 12 WEEKS GESTATION OF PREGNANCY: ICD-10-CM

## 2024-04-17 LAB — CFTR MUT TESTED BLD/T: NEGATIVE

## 2024-04-17 PROCEDURE — 99213 OFFICE O/P EST LOW 20 MIN: CPT

## 2024-04-17 PROCEDURE — 76801 OB US < 14 WKS SINGLE FETUS: CPT | Mod: 26

## 2024-04-17 PROCEDURE — 76813 OB US NUCHAL MEAS 1 GEST: CPT

## 2024-04-17 PROCEDURE — 81002 URINALYSIS NONAUTO W/O SCOPE: CPT

## 2024-04-17 PROCEDURE — 76813 OB US NUCHAL MEAS 1 GEST: CPT | Mod: 26

## 2024-04-17 PROCEDURE — 76801 OB US < 14 WKS SINGLE FETUS: CPT

## 2024-04-18 DIAGNOSIS — Z34.90 ENCOUNTER FOR SUPERVISION OF NORMAL PREGNANCY, UNSPECIFIED, UNSPECIFIED TRIMESTER: ICD-10-CM

## 2024-04-18 RX ORDER — ASPIRIN 81 MG/1
81 TABLET, CHEWABLE ORAL DAILY
Qty: 30 | Refills: 10 | Status: ACTIVE | COMMUNITY
Start: 2024-04-18 | End: 1900-01-01

## 2024-05-14 ENCOUNTER — NON-APPOINTMENT (OUTPATIENT)
Age: 29
End: 2024-05-14

## 2024-05-15 ENCOUNTER — OUTPATIENT (OUTPATIENT)
Dept: OUTPATIENT SERVICES | Facility: HOSPITAL | Age: 29
LOS: 1 days | End: 2024-05-15
Payer: COMMERCIAL

## 2024-05-15 ENCOUNTER — APPOINTMENT (OUTPATIENT)
Dept: OBGYN | Facility: CLINIC | Age: 29
End: 2024-05-15
Payer: COMMERCIAL

## 2024-05-15 VITALS
HEIGHT: 65 IN | DIASTOLIC BLOOD PRESSURE: 80 MMHG | BODY MASS INDEX: 42.49 KG/M2 | SYSTOLIC BLOOD PRESSURE: 136 MMHG | WEIGHT: 255 LBS

## 2024-05-15 DIAGNOSIS — Z90.49 ACQUIRED ABSENCE OF OTHER SPECIFIED PARTS OF DIGESTIVE TRACT: Chronic | ICD-10-CM

## 2024-05-15 DIAGNOSIS — Z34.90 ENCOUNTER FOR SUPERVISION OF NORMAL PREGNANCY, UNSPECIFIED, UNSPECIFIED TRIMESTER: ICD-10-CM

## 2024-05-15 DIAGNOSIS — Z98.890 OTHER SPECIFIED POSTPROCEDURAL STATES: Chronic | ICD-10-CM

## 2024-05-15 PROCEDURE — 81002 URINALYSIS NONAUTO W/O SCOPE: CPT

## 2024-05-15 PROCEDURE — 99213 OFFICE O/P EST LOW 20 MIN: CPT

## 2024-05-16 ENCOUNTER — OUTPATIENT (OUTPATIENT)
Dept: OUTPATIENT SERVICES | Facility: HOSPITAL | Age: 29
LOS: 1 days | End: 2024-05-16
Payer: COMMERCIAL

## 2024-05-16 DIAGNOSIS — Z90.49 ACQUIRED ABSENCE OF OTHER SPECIFIED PARTS OF DIGESTIVE TRACT: Chronic | ICD-10-CM

## 2024-05-16 DIAGNOSIS — Z34.90 ENCOUNTER FOR SUPERVISION OF NORMAL PREGNANCY, UNSPECIFIED, UNSPECIFIED TRIMESTER: ICD-10-CM

## 2024-05-16 DIAGNOSIS — Z98.890 OTHER SPECIFIED POSTPROCEDURAL STATES: Chronic | ICD-10-CM

## 2024-05-16 PROCEDURE — 82105 ALPHA-FETOPROTEIN SERUM: CPT

## 2024-05-17 ENCOUNTER — NON-APPOINTMENT (OUTPATIENT)
Age: 29
End: 2024-05-17

## 2024-05-17 DIAGNOSIS — Z34.90 ENCOUNTER FOR SUPERVISION OF NORMAL PREGNANCY, UNSPECIFIED, UNSPECIFIED TRIMESTER: ICD-10-CM

## 2024-05-17 LAB
BILIRUB UR QL STRIP: NORMAL
CLARITY UR: CLEAR
COLLECTION METHOD: NORMAL
GLUCOSE UR-MCNC: NORMAL
HCG UR QL: 0.2 EU/DL
HGB UR QL STRIP.AUTO: NORMAL
KETONES UR-MCNC: NORMAL
LEUKOCYTE ESTERASE UR QL STRIP: NORMAL
NITRITE UR QL STRIP: NORMAL
PH UR STRIP: 6.5
PROT UR STRIP-MCNC: NORMAL
SP GR UR STRIP: 1005

## 2024-05-22 ENCOUNTER — NON-APPOINTMENT (OUTPATIENT)
Age: 29
End: 2024-05-22

## 2024-05-24 ENCOUNTER — TRANSCRIPTION ENCOUNTER (OUTPATIENT)
Age: 29
End: 2024-05-24

## 2024-05-29 ENCOUNTER — APPOINTMENT (OUTPATIENT)
Dept: OBGYN | Facility: CLINIC | Age: 29
End: 2024-05-29

## 2024-05-29 LAB
AFP MOM: 1.15
AFP VALUE: 30 NG/ML
ALPHA FETOPROTEIN SERUM COMMENT: NORMAL
ALPHA FETOPROTEIN SERUM INTERPRETATION: NORMAL
ALPHA FETOPROTEIN SERUM RESULTS: NORMAL
ALPHA FETOPROTEIN SERUM TEST RESULTS: NORMAL
GESTATIONAL AGE BASED ON: NORMAL
GESTATIONAL AGE ON COLLECTION DATE: 16.4 WEEKS
INSULIN DEP DIABETES: NO
MATERNAL AGE AT EDD AFP: 31.7 YR
MULTIPLE GESTATION: NO
OSBR RISK 1 IN: 7603
RACE: NORMAL
WEIGHT AFP: 255 LBS

## 2024-06-12 ENCOUNTER — OUTPATIENT (OUTPATIENT)
Dept: OUTPATIENT SERVICES | Facility: HOSPITAL | Age: 29
LOS: 1 days | End: 2024-06-12
Payer: MEDICAID

## 2024-06-12 ENCOUNTER — APPOINTMENT (OUTPATIENT)
Dept: OBGYN | Facility: CLINIC | Age: 29
End: 2024-06-12
Payer: MEDICAID

## 2024-06-12 ENCOUNTER — OUTPATIENT (OUTPATIENT)
Dept: OUTPATIENT SERVICES | Facility: HOSPITAL | Age: 29
LOS: 1 days | End: 2024-06-12
Payer: COMMERCIAL

## 2024-06-12 ENCOUNTER — APPOINTMENT (OUTPATIENT)
Dept: ANTEPARTUM | Facility: CLINIC | Age: 29
End: 2024-06-12
Payer: MEDICAID

## 2024-06-12 ENCOUNTER — NON-APPOINTMENT (OUTPATIENT)
Age: 29
End: 2024-06-12

## 2024-06-12 ENCOUNTER — ASOB RESULT (OUTPATIENT)
Age: 29
End: 2024-06-12

## 2024-06-12 VITALS — WEIGHT: 258 LBS | BODY MASS INDEX: 42.99 KG/M2 | HEIGHT: 65 IN

## 2024-06-12 VITALS — SYSTOLIC BLOOD PRESSURE: 122 MMHG | DIASTOLIC BLOOD PRESSURE: 70 MMHG

## 2024-06-12 DIAGNOSIS — Z34.90 ENCOUNTER FOR SUPERVISION OF NORMAL PREGNANCY, UNSPECIFIED, UNSPECIFIED TRIMESTER: ICD-10-CM

## 2024-06-12 DIAGNOSIS — Z71.3 DIETARY COUNSELING AND SURVEILLANCE: ICD-10-CM

## 2024-06-12 DIAGNOSIS — Z90.49 ACQUIRED ABSENCE OF OTHER SPECIFIED PARTS OF DIGESTIVE TRACT: Chronic | ICD-10-CM

## 2024-06-12 DIAGNOSIS — Z98.890 OTHER SPECIFIED POSTPROCEDURAL STATES: Chronic | ICD-10-CM

## 2024-06-12 PROCEDURE — 76817 TRANSVAGINAL US OBSTETRIC: CPT | Mod: 26

## 2024-06-12 PROCEDURE — 76811 OB US DETAILED SNGL FETUS: CPT | Mod: 26

## 2024-06-12 PROCEDURE — 99213 OFFICE O/P EST LOW 20 MIN: CPT

## 2024-06-12 PROCEDURE — 76811 OB US DETAILED SNGL FETUS: CPT

## 2024-06-12 PROCEDURE — 81002 URINALYSIS NONAUTO W/O SCOPE: CPT

## 2024-06-12 PROCEDURE — 76817 TRANSVAGINAL US OBSTETRIC: CPT

## 2024-06-13 DIAGNOSIS — Z34.90 ENCOUNTER FOR SUPERVISION OF NORMAL PREGNANCY, UNSPECIFIED, UNSPECIFIED TRIMESTER: ICD-10-CM

## 2024-06-13 LAB
BILIRUB UR QL STRIP: NORMAL
CLARITY UR: CLEAR
COLLECTION METHOD: NORMAL
GLUCOSE UR-MCNC: NORMAL
HCG UR QL: NORMAL EU/DL
HGB UR QL STRIP.AUTO: NORMAL
KETONES UR-MCNC: NORMAL
LEUKOCYTE ESTERASE UR QL STRIP: NORMAL
NITRITE UR QL STRIP: NORMAL
PH UR STRIP: 6.5
PROT UR STRIP-MCNC: NORMAL
SP GR UR STRIP: 1.02

## 2024-06-17 DIAGNOSIS — Z36.3 ENCOUNTER FOR ANTENATAL SCREENING FOR MALFORMATIONS: ICD-10-CM

## 2024-06-17 DIAGNOSIS — Z3A.20 20 WEEKS GESTATION OF PREGNANCY: ICD-10-CM

## 2024-06-17 DIAGNOSIS — O99.212 OBESITY COMPLICATING PREGNANCY, SECOND TRIMESTER: ICD-10-CM

## 2024-06-25 ENCOUNTER — NON-APPOINTMENT (OUTPATIENT)
Age: 29
End: 2024-06-25

## 2024-07-03 ENCOUNTER — APPOINTMENT (OUTPATIENT)
Dept: ANTEPARTUM | Facility: CLINIC | Age: 29
End: 2024-07-03
Payer: MEDICAID

## 2024-07-03 ENCOUNTER — ASOB RESULT (OUTPATIENT)
Age: 29
End: 2024-07-03

## 2024-07-03 ENCOUNTER — OUTPATIENT (OUTPATIENT)
Dept: OUTPATIENT SERVICES | Facility: HOSPITAL | Age: 29
LOS: 1 days | End: 2024-07-03
Payer: MEDICAID

## 2024-07-03 DIAGNOSIS — Z98.890 OTHER SPECIFIED POSTPROCEDURAL STATES: Chronic | ICD-10-CM

## 2024-07-03 DIAGNOSIS — Z34.90 ENCOUNTER FOR SUPERVISION OF NORMAL PREGNANCY, UNSPECIFIED, UNSPECIFIED TRIMESTER: ICD-10-CM

## 2024-07-03 DIAGNOSIS — O99.210 OBESITY COMPLICATING PREGNANCY, UNSPECIFIED TRIMESTER: ICD-10-CM

## 2024-07-03 DIAGNOSIS — Z90.49 ACQUIRED ABSENCE OF OTHER SPECIFIED PARTS OF DIGESTIVE TRACT: Chronic | ICD-10-CM

## 2024-07-03 PROCEDURE — 76816 OB US FOLLOW-UP PER FETUS: CPT

## 2024-07-03 PROCEDURE — 76816 OB US FOLLOW-UP PER FETUS: CPT | Mod: 26

## 2024-07-09 DIAGNOSIS — Z3A.23 23 WEEKS GESTATION OF PREGNANCY: ICD-10-CM

## 2024-07-09 DIAGNOSIS — Z36.3 ENCOUNTER FOR ANTENATAL SCREENING FOR MALFORMATIONS: ICD-10-CM

## 2024-07-09 DIAGNOSIS — O99.212 OBESITY COMPLICATING PREGNANCY, SECOND TRIMESTER: ICD-10-CM

## 2024-07-10 ENCOUNTER — APPOINTMENT (OUTPATIENT)
Dept: OBGYN | Facility: CLINIC | Age: 29
End: 2024-07-10
Payer: COMMERCIAL

## 2024-07-10 ENCOUNTER — NON-APPOINTMENT (OUTPATIENT)
Age: 29
End: 2024-07-10

## 2024-07-10 ENCOUNTER — OUTPATIENT (OUTPATIENT)
Dept: OUTPATIENT SERVICES | Facility: HOSPITAL | Age: 29
LOS: 1 days | End: 2024-07-10
Payer: MEDICAID

## 2024-07-10 ENCOUNTER — APPOINTMENT (OUTPATIENT)
Dept: PEDIATRIC CARDIOLOGY | Facility: CLINIC | Age: 29
End: 2024-07-10
Payer: COMMERCIAL

## 2024-07-10 VITALS — BODY MASS INDEX: 43.43 KG/M2 | DIASTOLIC BLOOD PRESSURE: 60 MMHG | WEIGHT: 261 LBS | SYSTOLIC BLOOD PRESSURE: 100 MMHG

## 2024-07-10 DIAGNOSIS — Z34.90 ENCOUNTER FOR SUPERVISION OF NORMAL PREGNANCY, UNSPECIFIED, UNSPECIFIED TRIMESTER: ICD-10-CM

## 2024-07-10 PROCEDURE — 99205 OFFICE O/P NEW HI 60 MIN: CPT

## 2024-07-10 PROCEDURE — 76827 ECHO EXAM OF FETAL HEART: CPT

## 2024-07-10 PROCEDURE — 81002 URINALYSIS NONAUTO W/O SCOPE: CPT

## 2024-07-10 PROCEDURE — 99213 OFFICE O/P EST LOW 20 MIN: CPT

## 2024-07-10 PROCEDURE — 93325 DOPPLER ECHO COLOR FLOW MAPG: CPT

## 2024-07-10 PROCEDURE — 76825 ECHO EXAM OF FETAL HEART: CPT

## 2024-07-11 DIAGNOSIS — Z34.90 ENCOUNTER FOR SUPERVISION OF NORMAL PREGNANCY, UNSPECIFIED, UNSPECIFIED TRIMESTER: ICD-10-CM

## 2024-07-11 LAB
BILIRUB UR QL STRIP: NORMAL
CLARITY UR: CLEAR
COLLECTION METHOD: NORMAL
GLUCOSE UR-MCNC: NORMAL
HCG UR QL: 0.2 EU/DL
KETONES UR-MCNC: NORMAL
LEUKOCYTE ESTERASE UR QL STRIP: NORMAL
NITRITE UR QL STRIP: NORMAL
PH UR STRIP: 6
PROT UR STRIP-MCNC: NORMAL
SP GR UR STRIP: 1.02

## 2024-07-17 ENCOUNTER — APPOINTMENT (OUTPATIENT)
Dept: OTOLARYNGOLOGY | Facility: CLINIC | Age: 29
End: 2024-07-17
Payer: MEDICAID

## 2024-07-17 VITALS — BODY MASS INDEX: 43.49 KG/M2 | WEIGHT: 261 LBS | HEIGHT: 65 IN

## 2024-07-17 DIAGNOSIS — S03.00XA DISLOCATION OF JAW, UNSPECIFIED SIDE, INITIAL ENCOUNTER: ICD-10-CM

## 2024-07-17 PROCEDURE — 99204 OFFICE O/P NEW MOD 45 MIN: CPT

## 2024-07-19 NOTE — ED ADULT NURSE NOTE - CAS TRG GEN SKIN COLOR
Called and lvm reminding pt of her appt for 7/22 @ 8 AM    Electronically signed by Renate Nicole on 7/19/2024 at 9:20 AM     Normal for race

## 2024-08-07 ENCOUNTER — NON-APPOINTMENT (OUTPATIENT)
Age: 29
End: 2024-08-07

## 2024-08-07 ENCOUNTER — APPOINTMENT (OUTPATIENT)
Dept: OBGYN | Facility: CLINIC | Age: 29
End: 2024-08-07

## 2024-08-07 ENCOUNTER — OUTPATIENT (OUTPATIENT)
Dept: OUTPATIENT SERVICES | Facility: HOSPITAL | Age: 29
LOS: 1 days | End: 2024-08-07
Payer: COMMERCIAL

## 2024-08-07 DIAGNOSIS — Z34.90 ENCOUNTER FOR SUPERVISION OF NORMAL PREGNANCY, UNSPECIFIED, UNSPECIFIED TRIMESTER: ICD-10-CM

## 2024-08-07 DIAGNOSIS — Z98.890 OTHER SPECIFIED POSTPROCEDURAL STATES: Chronic | ICD-10-CM

## 2024-08-07 DIAGNOSIS — Z90.49 ACQUIRED ABSENCE OF OTHER SPECIFIED PARTS OF DIGESTIVE TRACT: Chronic | ICD-10-CM

## 2024-08-07 PROBLEM — Z23 ENCOUNTER FOR IMMUNIZATION: Status: ACTIVE | Noted: 2024-08-07 | Resolved: 2024-08-21

## 2024-08-07 PROCEDURE — 99213 OFFICE O/P EST LOW 20 MIN: CPT

## 2024-08-08 DIAGNOSIS — Z34.90 ENCOUNTER FOR SUPERVISION OF NORMAL PREGNANCY, UNSPECIFIED, UNSPECIFIED TRIMESTER: ICD-10-CM

## 2024-08-21 ENCOUNTER — APPOINTMENT (OUTPATIENT)
Dept: OBGYN | Facility: CLINIC | Age: 29
End: 2024-08-21
Payer: COMMERCIAL

## 2024-08-21 ENCOUNTER — NON-APPOINTMENT (OUTPATIENT)
Age: 29
End: 2024-08-21

## 2024-08-21 VITALS — SYSTOLIC BLOOD PRESSURE: 110 MMHG | DIASTOLIC BLOOD PRESSURE: 72 MMHG | BODY MASS INDEX: 44.76 KG/M2 | WEIGHT: 269 LBS

## 2024-08-21 PROCEDURE — 99213 OFFICE O/P EST LOW 20 MIN: CPT

## 2024-08-22 LAB
BILIRUB UR QL STRIP: NORMAL
CLARITY UR: CLEAR
COLLECTION METHOD: NORMAL
GLUCOSE UR-MCNC: NORMAL
HCG UR QL: 0.2 EU/DL
HGB UR QL STRIP.AUTO: NORMAL
KETONES UR-MCNC: NORMAL
LEUKOCYTE ESTERASE UR QL STRIP: NORMAL
NITRITE UR QL STRIP: NORMAL
PH UR STRIP: 7
PROT UR STRIP-MCNC: NORMAL
SP GR UR STRIP: 1010

## 2024-09-03 ENCOUNTER — OUTPATIENT (OUTPATIENT)
Dept: OUTPATIENT SERVICES | Facility: HOSPITAL | Age: 29
LOS: 1 days | End: 2024-09-03
Payer: MEDICAID

## 2024-09-03 ENCOUNTER — ASOB RESULT (OUTPATIENT)
Age: 29
End: 2024-09-03

## 2024-09-03 ENCOUNTER — APPOINTMENT (OUTPATIENT)
Dept: ANTEPARTUM | Facility: CLINIC | Age: 29
End: 2024-09-03
Payer: MEDICAID

## 2024-09-03 DIAGNOSIS — Z34.90 ENCOUNTER FOR SUPERVISION OF NORMAL PREGNANCY, UNSPECIFIED, UNSPECIFIED TRIMESTER: ICD-10-CM

## 2024-09-03 DIAGNOSIS — Z98.890 OTHER SPECIFIED POSTPROCEDURAL STATES: Chronic | ICD-10-CM

## 2024-09-03 PROCEDURE — 76819 FETAL BIOPHYS PROFIL W/O NST: CPT | Mod: 26,59

## 2024-09-03 PROCEDURE — 76819 FETAL BIOPHYS PROFIL W/O NST: CPT

## 2024-09-03 PROCEDURE — 76816 OB US FOLLOW-UP PER FETUS: CPT | Mod: 26

## 2024-09-03 PROCEDURE — 76816 OB US FOLLOW-UP PER FETUS: CPT

## 2024-09-04 ENCOUNTER — NON-APPOINTMENT (OUTPATIENT)
Age: 29
End: 2024-09-04

## 2024-09-04 ENCOUNTER — APPOINTMENT (OUTPATIENT)
Dept: OBGYN | Facility: CLINIC | Age: 29
End: 2024-09-04
Payer: MEDICAID

## 2024-09-04 ENCOUNTER — OUTPATIENT (OUTPATIENT)
Dept: OUTPATIENT SERVICES | Facility: HOSPITAL | Age: 29
LOS: 1 days | End: 2024-09-04
Payer: MEDICAID

## 2024-09-04 VITALS — SYSTOLIC BLOOD PRESSURE: 120 MMHG | DIASTOLIC BLOOD PRESSURE: 72 MMHG | BODY MASS INDEX: 45.1 KG/M2 | WEIGHT: 271 LBS

## 2024-09-04 DIAGNOSIS — Z23 ENCOUNTER FOR IMMUNIZATION: ICD-10-CM

## 2024-09-04 DIAGNOSIS — Z34.90 ENCOUNTER FOR SUPERVISION OF NORMAL PREGNANCY, UNSPECIFIED, UNSPECIFIED TRIMESTER: ICD-10-CM

## 2024-09-04 DIAGNOSIS — Z98.890 OTHER SPECIFIED POSTPROCEDURAL STATES: Chronic | ICD-10-CM

## 2024-09-04 PROCEDURE — 90678 RSV VACC PREF BIVALENT IM: CPT

## 2024-09-04 PROCEDURE — 99213 OFFICE O/P EST LOW 20 MIN: CPT

## 2024-09-04 PROCEDURE — 81002 URINALYSIS NONAUTO W/O SCOPE: CPT

## 2024-09-04 PROCEDURE — 90471 IMMUNIZATION ADMIN: CPT

## 2024-09-05 DIAGNOSIS — Z03.74 ENCOUNTER FOR SUSPECTED PROBLEM WITH FETAL GROWTH RULED OUT: ICD-10-CM

## 2024-09-05 DIAGNOSIS — O99.213 OBESITY COMPLICATING PREGNANCY, THIRD TRIMESTER: ICD-10-CM

## 2024-09-05 DIAGNOSIS — Z3A.32 32 WEEKS GESTATION OF PREGNANCY: ICD-10-CM

## 2024-09-05 LAB
BILIRUB UR QL STRIP: NORMAL
CLARITY UR: CLEAR
COLLECTION METHOD: NORMAL
GLUCOSE UR-MCNC: NORMAL
HCG UR QL: 0.2 EU/DL
HGB UR QL STRIP.AUTO: NORMAL
KETONES UR-MCNC: NORMAL
LEUKOCYTE ESTERASE UR QL STRIP: NORMAL
NITRITE UR QL STRIP: NORMAL
PH UR STRIP: 6
PROT UR STRIP-MCNC: NORMAL
SP GR UR STRIP: 1.02

## 2024-09-06 DIAGNOSIS — Z23 ENCOUNTER FOR IMMUNIZATION: ICD-10-CM

## 2024-09-06 DIAGNOSIS — Z34.90 ENCOUNTER FOR SUPERVISION OF NORMAL PREGNANCY, UNSPECIFIED, UNSPECIFIED TRIMESTER: ICD-10-CM

## 2024-09-06 DIAGNOSIS — Z00.00 ENCOUNTER FOR GENERAL ADULT MEDICAL EXAMINATION WITHOUT ABNORMAL FINDINGS: ICD-10-CM

## 2024-09-16 ENCOUNTER — ASOB RESULT (OUTPATIENT)
Age: 29
End: 2024-09-16

## 2024-09-16 ENCOUNTER — OUTPATIENT (OUTPATIENT)
Dept: OUTPATIENT SERVICES | Facility: HOSPITAL | Age: 29
LOS: 1 days | End: 2024-09-16
Payer: MEDICAID

## 2024-09-16 ENCOUNTER — APPOINTMENT (OUTPATIENT)
Dept: ANTEPARTUM | Facility: CLINIC | Age: 29
End: 2024-09-16
Payer: MEDICAID

## 2024-09-16 DIAGNOSIS — Z98.890 OTHER SPECIFIED POSTPROCEDURAL STATES: Chronic | ICD-10-CM

## 2024-09-16 DIAGNOSIS — Z34.90 ENCOUNTER FOR SUPERVISION OF NORMAL PREGNANCY, UNSPECIFIED, UNSPECIFIED TRIMESTER: ICD-10-CM

## 2024-09-16 PROCEDURE — 76819 FETAL BIOPHYS PROFIL W/O NST: CPT

## 2024-09-16 PROCEDURE — 76819 FETAL BIOPHYS PROFIL W/O NST: CPT | Mod: 26

## 2024-09-18 ENCOUNTER — NON-APPOINTMENT (OUTPATIENT)
Age: 29
End: 2024-09-18

## 2024-09-18 ENCOUNTER — APPOINTMENT (OUTPATIENT)
Dept: OBGYN | Facility: CLINIC | Age: 29
End: 2024-09-18
Payer: MEDICAID

## 2024-09-18 ENCOUNTER — OUTPATIENT (OUTPATIENT)
Dept: OUTPATIENT SERVICES | Facility: HOSPITAL | Age: 29
LOS: 1 days | End: 2024-09-18
Payer: MEDICAID

## 2024-09-18 VITALS — DIASTOLIC BLOOD PRESSURE: 73 MMHG | SYSTOLIC BLOOD PRESSURE: 114 MMHG

## 2024-09-18 VITALS — WEIGHT: 275 LBS | BODY MASS INDEX: 45.76 KG/M2

## 2024-09-18 DIAGNOSIS — Z34.90 ENCOUNTER FOR SUPERVISION OF NORMAL PREGNANCY, UNSPECIFIED, UNSPECIFIED TRIMESTER: ICD-10-CM

## 2024-09-18 PROCEDURE — 99213 OFFICE O/P EST LOW 20 MIN: CPT

## 2024-09-18 PROCEDURE — 81002 URINALYSIS NONAUTO W/O SCOPE: CPT

## 2024-09-19 ENCOUNTER — NON-APPOINTMENT (OUTPATIENT)
Age: 29
End: 2024-09-19

## 2024-09-19 DIAGNOSIS — Z34.90 ENCOUNTER FOR SUPERVISION OF NORMAL PREGNANCY, UNSPECIFIED, UNSPECIFIED TRIMESTER: ICD-10-CM

## 2024-09-19 DIAGNOSIS — Z3A.34 34 WEEKS GESTATION OF PREGNANCY: ICD-10-CM

## 2024-09-19 DIAGNOSIS — O99.213 OBESITY COMPLICATING PREGNANCY, THIRD TRIMESTER: ICD-10-CM

## 2024-09-19 LAB
BILIRUB UR QL STRIP: NORMAL
CLARITY UR: CLEAR
COLLECTION METHOD: NORMAL
GLUCOSE UR-MCNC: NORMAL
HCG UR QL: 0.2 EU/DL
HGB UR QL STRIP.AUTO: NORMAL
KETONES UR-MCNC: NORMAL
LEUKOCYTE ESTERASE UR QL STRIP: NORMAL
NITRITE UR QL STRIP: NORMAL
PH UR STRIP: 8
PROT UR STRIP-MCNC: NORMAL
SP GR UR STRIP: 1020

## 2024-09-23 ENCOUNTER — APPOINTMENT (OUTPATIENT)
Dept: ANTEPARTUM | Facility: CLINIC | Age: 29
End: 2024-09-23
Payer: MEDICAID

## 2024-09-23 ENCOUNTER — OUTPATIENT (OUTPATIENT)
Dept: OUTPATIENT SERVICES | Facility: HOSPITAL | Age: 29
LOS: 1 days | End: 2024-09-23
Payer: MEDICAID

## 2024-09-23 ENCOUNTER — ASOB RESULT (OUTPATIENT)
Age: 29
End: 2024-09-23

## 2024-09-23 DIAGNOSIS — Z34.90 ENCOUNTER FOR SUPERVISION OF NORMAL PREGNANCY, UNSPECIFIED, UNSPECIFIED TRIMESTER: ICD-10-CM

## 2024-09-23 DIAGNOSIS — Z98.890 OTHER SPECIFIED POSTPROCEDURAL STATES: Chronic | ICD-10-CM

## 2024-09-23 PROCEDURE — 76819 FETAL BIOPHYS PROFIL W/O NST: CPT

## 2024-09-23 PROCEDURE — 76819 FETAL BIOPHYS PROFIL W/O NST: CPT | Mod: 26

## 2024-09-24 ENCOUNTER — OUTPATIENT (OUTPATIENT)
Dept: INPATIENT UNIT | Facility: HOSPITAL | Age: 29
LOS: 1 days | Discharge: HOME | End: 2024-09-24
Payer: MEDICAID

## 2024-09-24 VITALS
TEMPERATURE: 98 F | SYSTOLIC BLOOD PRESSURE: 139 MMHG | RESPIRATION RATE: 18 BRPM | DIASTOLIC BLOOD PRESSURE: 95 MMHG | HEART RATE: 98 BPM

## 2024-09-24 VITALS — DIASTOLIC BLOOD PRESSURE: 72 MMHG | HEART RATE: 90 BPM | SYSTOLIC BLOOD PRESSURE: 118 MMHG

## 2024-09-24 DIAGNOSIS — O26.899 OTHER SPECIFIED PREGNANCY RELATED CONDITIONS, UNSPECIFIED TRIMESTER: ICD-10-CM

## 2024-09-24 DIAGNOSIS — Z98.890 OTHER SPECIFIED POSTPROCEDURAL STATES: Chronic | ICD-10-CM

## 2024-09-24 PROBLEM — D50.8 OTHER IRON DEFICIENCY ANEMIAS: Chronic | Status: INACTIVE | Noted: 2018-07-05 | Resolved: 2024-09-24

## 2024-09-24 LAB
ALBUMIN SERPL ELPH-MCNC: 3.5 G/DL — SIGNIFICANT CHANGE UP (ref 3.5–5.2)
ALP SERPL-CCNC: 187 U/L — HIGH (ref 30–115)
ALT FLD-CCNC: 45 U/L — HIGH (ref 0–41)
ANION GAP SERPL CALC-SCNC: 10 MMOL/L — SIGNIFICANT CHANGE UP (ref 7–14)
APPEARANCE UR: CLEAR — SIGNIFICANT CHANGE UP
AST SERPL-CCNC: 41 U/L — SIGNIFICANT CHANGE UP (ref 0–41)
BASOPHILS # BLD AUTO: 0.02 K/UL — SIGNIFICANT CHANGE UP (ref 0–0.2)
BASOPHILS NFR BLD AUTO: 0.2 % — SIGNIFICANT CHANGE UP (ref 0–1)
BILIRUB SERPL-MCNC: <0.2 MG/DL — SIGNIFICANT CHANGE UP (ref 0.2–1.2)
BILIRUB UR-MCNC: NEGATIVE — SIGNIFICANT CHANGE UP
BUN SERPL-MCNC: 10 MG/DL — SIGNIFICANT CHANGE UP (ref 10–20)
CALCIUM SERPL-MCNC: 9.5 MG/DL — SIGNIFICANT CHANGE UP (ref 8.4–10.5)
CHLORIDE SERPL-SCNC: 106 MMOL/L — SIGNIFICANT CHANGE UP (ref 98–110)
CO2 SERPL-SCNC: 21 MMOL/L — SIGNIFICANT CHANGE UP (ref 17–32)
COLOR SPEC: YELLOW — SIGNIFICANT CHANGE UP
CREAT ?TM UR-MCNC: 12 MG/DL — SIGNIFICANT CHANGE UP
CREAT SERPL-MCNC: <0.5 MG/DL — LOW (ref 0.7–1.5)
DIFF PNL FLD: NEGATIVE — SIGNIFICANT CHANGE UP
EGFR: 136 ML/MIN/1.73M2 — SIGNIFICANT CHANGE UP
EOSINOPHIL # BLD AUTO: 0.08 K/UL — SIGNIFICANT CHANGE UP (ref 0–0.7)
EOSINOPHIL NFR BLD AUTO: 0.9 % — SIGNIFICANT CHANGE UP (ref 0–8)
GLUCOSE SERPL-MCNC: 95 MG/DL — SIGNIFICANT CHANGE UP (ref 70–99)
GLUCOSE UR QL: NEGATIVE MG/DL — SIGNIFICANT CHANGE UP
HCT VFR BLD CALC: 36.7 % — LOW (ref 37–47)
HGB BLD-MCNC: 12.2 G/DL — SIGNIFICANT CHANGE UP (ref 12–16)
IMM GRANULOCYTES NFR BLD AUTO: 0.9 % — HIGH (ref 0.1–0.3)
KETONES UR-MCNC: NEGATIVE MG/DL — SIGNIFICANT CHANGE UP
LDH SERPL L TO P-CCNC: 159 — SIGNIFICANT CHANGE UP (ref 50–242)
LEUKOCYTE ESTERASE UR-ACNC: NEGATIVE — SIGNIFICANT CHANGE UP
LYMPHOCYTES # BLD AUTO: 2.45 K/UL — SIGNIFICANT CHANGE UP (ref 1.2–3.4)
LYMPHOCYTES # BLD AUTO: 26.3 % — SIGNIFICANT CHANGE UP (ref 20.5–51.1)
MCHC RBC-ENTMCNC: 29.5 PG — SIGNIFICANT CHANGE UP (ref 27–31)
MCHC RBC-ENTMCNC: 33.2 G/DL — SIGNIFICANT CHANGE UP (ref 32–37)
MCV RBC AUTO: 88.9 FL — SIGNIFICANT CHANGE UP (ref 81–99)
MONOCYTES # BLD AUTO: 0.71 K/UL — HIGH (ref 0.1–0.6)
MONOCYTES NFR BLD AUTO: 7.6 % — SIGNIFICANT CHANGE UP (ref 1.7–9.3)
NEUTROPHILS # BLD AUTO: 5.96 K/UL — SIGNIFICANT CHANGE UP (ref 1.4–6.5)
NEUTROPHILS NFR BLD AUTO: 64.1 % — SIGNIFICANT CHANGE UP (ref 42.2–75.2)
NITRITE UR-MCNC: NEGATIVE — SIGNIFICANT CHANGE UP
NRBC # BLD: 0 /100 WBCS — SIGNIFICANT CHANGE UP (ref 0–0)
PH UR: 6.5 — SIGNIFICANT CHANGE UP (ref 5–8)
PLATELET # BLD AUTO: 226 K/UL — SIGNIFICANT CHANGE UP (ref 130–400)
PMV BLD: 11.3 FL — HIGH (ref 7.4–10.4)
POTASSIUM SERPL-MCNC: 4.4 MMOL/L — SIGNIFICANT CHANGE UP (ref 3.5–5)
POTASSIUM SERPL-SCNC: 4.4 MMOL/L — SIGNIFICANT CHANGE UP (ref 3.5–5)
PROT ?TM UR-MCNC: <5 MG/DLG/24H — SIGNIFICANT CHANGE UP
PROT SERPL-MCNC: 6 G/DL — SIGNIFICANT CHANGE UP (ref 6–8)
PROT UR-MCNC: NEGATIVE MG/DL — SIGNIFICANT CHANGE UP
PROT/CREAT UR-RTO: <0.4 RATIO — HIGH (ref 0–0.2)
RBC # BLD: 4.13 M/UL — LOW (ref 4.2–5.4)
RBC # FLD: 14 % — SIGNIFICANT CHANGE UP (ref 11.5–14.5)
SODIUM SERPL-SCNC: 137 MMOL/L — SIGNIFICANT CHANGE UP (ref 135–146)
SP GR SPEC: <1.005 — LOW (ref 1–1.03)
URATE SERPL-MCNC: 3.7 MG/DL — SIGNIFICANT CHANGE UP (ref 2.5–7)
UROBILINOGEN FLD QL: 0.2 MG/DL — SIGNIFICANT CHANGE UP (ref 0.2–1)
WBC # BLD: 9.3 K/UL — SIGNIFICANT CHANGE UP (ref 4.8–10.8)
WBC # FLD AUTO: 9.3 K/UL — SIGNIFICANT CHANGE UP (ref 4.8–10.8)

## 2024-09-24 PROCEDURE — 84156 ASSAY OF PROTEIN URINE: CPT

## 2024-09-24 PROCEDURE — 59025 FETAL NON-STRESS TEST: CPT

## 2024-09-24 PROCEDURE — 59025 FETAL NON-STRESS TEST: CPT | Mod: 26

## 2024-09-24 PROCEDURE — 36415 COLL VENOUS BLD VENIPUNCTURE: CPT

## 2024-09-24 PROCEDURE — 80053 COMPREHEN METABOLIC PANEL: CPT

## 2024-09-24 PROCEDURE — 76815 OB US LIMITED FETUS(S): CPT | Mod: 26

## 2024-09-24 PROCEDURE — 83615 LACTATE (LD) (LDH) ENZYME: CPT

## 2024-09-24 PROCEDURE — 84550 ASSAY OF BLOOD/URIC ACID: CPT

## 2024-09-24 PROCEDURE — 99223 1ST HOSP IP/OBS HIGH 75: CPT | Mod: 25

## 2024-09-24 PROCEDURE — 82570 ASSAY OF URINE CREATININE: CPT

## 2024-09-24 PROCEDURE — 99418 PROLNG IP/OBS E/M EA 15 MIN: CPT | Mod: 25

## 2024-09-24 PROCEDURE — 85025 COMPLETE CBC W/AUTO DIFF WBC: CPT

## 2024-09-24 PROCEDURE — 81003 URINALYSIS AUTO W/O SCOPE: CPT

## 2024-09-24 PROCEDURE — 99214 OFFICE O/P EST MOD 30 MIN: CPT

## 2024-09-24 NOTE — OB PROVIDER TRIAGE NOTE - ADDITIONAL INSTRUCTIONS
Return to L&D if you experience any of the following: painful regular contractions, leakage of vaginal fluid, vaginal bleeding, or decreased fetal movement (less than 10 movements in 2 hours).    If you have headache, blurry vision, chest pain, difficulty breathing, or severe abdominal pain, call the doctor or return to the hospital.

## 2024-09-24 NOTE — OB PROVIDER TRIAGE NOTE - NSICDXPASTSURGICALHX_GEN_ALL_CORE_FT
PAST SURGICAL HISTORY:  H/O endoscopy stent placement in duct of gallbladder    S/P cholecystectomy

## 2024-09-24 NOTE — OB PROVIDER TRIAGE NOTE - NSOBPROVIDERNOTE_OBGYN_ALL_OB_FT
A/P: 28yo  at 35w2d c/o decreased fetal movement x1 day, reporting feeling baby move since being here    - [facility] with clicker  - PO hydration encouraged  - continuous efm/toco  - monitor vitals, first BP in triage was 139/95  - re-evaluate in 1 hour    Discussed with Dr. Frazier and Dr. Orr A/P: 30yo  at 35w2d c/o decreased fetal movement x1 day, reporting feeling baby move since being here, multiple elevated BPs    - [facility] with clicker  - PO hydration encouraged  - continuous efm/toco  - monitor vitals  - f/u PELs  - re-evaluate in 1 hour    Discussed with Dr. Frazier and Dr. Orr A/P: 30yo  at 35w2d c/o decreased fetal movement x1 day, reporting feeling baby move since being here, multiple elevated BPs    - [facility] with clicker  - PO hydration encouraged  - continuous efm/toco  - monitor vitals  - f/u PELs  - re-evaluate in 1 hour    Discussed with Dr. Frazier and Dr. Orr    ADDENDUM: PELs wnl, BPs normalized.  Patient with reassuring maternal and fetal status.    - D/c home with labor and PEC precautions discussed.

## 2024-09-24 NOTE — OB PROVIDER TRIAGE NOTE - HISTORY OF PRESENT ILLNESS
30yo  at 35w2d, HATTIE 10/27/24 by LMP c/w first trimester sono, presenting to L&D for evaluation of decreased fetal movement x1 day.  Patient states she went for her weekly BPP yesterday AM and sonographer had to actively "shake" baby to get movement.  BPP was reported to be 8/8.  Following sonogram, patient noticed she was not feeling baby move as much as usual.  Denies ctx, lof, vb.  Pregnancy c/b obesity, BMI 43 requiring weekly BPPs.  Last intercourse weeks ago, last PO intake , last BM today.  GBS unknown.

## 2024-09-24 NOTE — OB PROVIDER TRIAGE NOTE - NS_FETALMONCTXPAT_OBGYN_ALL_OB
"  CC: f/u dperression    HX: The patient reports that her mood is \"good.\"  The patient continues to argue and fight with her mother even though she is living with another family member.  The patient had missed a day here and the patient's mother was apparently very worried that she was going to get charged for truancy.  There is also been a great deal of stress at home due conflict between her aunt and her aunt's significant other.  Apparently, she's had to help move her aunt out.  According to the therapist, little progress has been made to do frequently missing the program here.  The patient also complains of the medications not helping.    Depression rating 5-6/10  Anxiety rating 4-5/10  Appetite-good   Energy level-low  Sleep- poor lately    I have reviewed pt's allergies and current medications.     Review of Systems   Constitutional: Negative.    Cardiovascular: Negative.    Gastrointestinal: Negative.    Neurological: Negative.      /68  Pulse (!) 58  Temp 97.7 °F (36.5 °C)  Resp 16  LMP 03/01/2017 (Within Days)    EXAM: Casually dressed, fair eye contact. Gait and station stable. No psychomotor agitation/retardation. No motor tics Speech is normal rate, amount. Associations intact. Mood \"good\" affect slightly anxious.. TC-goal directed.  Denies SI/HI/VH/AH. TP-linear.  Attention and concentration are fair. Memory is intact for recent and remote events. Insight and judgment are poor.      Encounter Diagnoses   Name Primary?   • Severe episode of recurrent major depressive disorder, without psychotic features Yes   • PEGGY (generalized anxiety disorder)    • Cannabis dependence, unspecified        Current Outpatient Prescriptions   Medication Sig Dispense Refill   • FLUoxetine (PROzac) 10 MG capsule Take 1 capsule by mouth Daily. 30 capsule 0     No current facility-administered medications for this visit.      Plan:  1.  Increase Prozac to 20 mg daily  2.  Obtain a CPT as I still have concerns about " ADHD symptoms, particularly attention and impulsivity  3.  Continue partial hospitalization        The risks, benefits, and treatment alternatives were discussed with the patient.     TIME IN:1:36P  TIME OUT1:40P     Uterine Irritability

## 2024-09-24 NOTE — OB PROVIDER TRIAGE NOTE - NS_OBGYNHISTORY_OBGYN_ALL_OB_FT
Ob hx:   eTOP x2, medical management    Gyn hx: denies h/o abnormal paps, STIs, ovarian cysts, uterine fibroids, endometriosis

## 2024-09-24 NOTE — OB RN TRIAGE NOTE - FALL HARM RISK - UNIVERSAL INTERVENTIONS
Bed in lowest position, wheels locked, appropriate side rails in place/Call bell, personal items and telephone in reach/Instruct patient to call for assistance before getting out of bed or chair/Non-slip footwear when patient is out of bed/[place] to call system/Physically safe environment - no spills, clutter or unnecessary equipment/Purposeful Proactive Rounding/Room/bathroom lighting operational, light cord in reach

## 2024-09-24 NOTE — OB PROVIDER TRIAGE NOTE - NSHPLABSRESULTS_GEN_ALL_CORE
4/5  A+  antibody screen neg  rubella immune  varicella immune  RPR NR  Hep B neg  HIV neg  HCV neg    7/27      sonos:  35w1d- vertex, posterior placenta, BPP 8/8  32w2d- vertex, posterior placenta, MVP 4.15cm, BPP 8/8, EFW 2219g  23w3d- transverse back down, posterior placenta, MVP 4.96cm, limited views of heart, fetal echo recommened--> normal fetal echo  20w3d- breech, posterior placenta, MVP 4.48cm, EFW 371g, normal anatomy, limited views of heart  12w3d- NT 1.7mm wnl

## 2024-09-24 NOTE — OB PROVIDER TRIAGE NOTE - NSHPPHYSICALEXAM_GEN_ALL_CORE
Physical exam:    Vital Signs Last 24 Hrs  T(F): 97.9 (24 Sep 2024 01:04), Max: 97.9 (24 Sep 2024 01:04)  HR: 83 (24 Sep 2024 01:25) (83 - 98)  BP: 136/75 (24 Sep 2024 01:25) (136/75 - 139/95)  RR: 18 (24 Sep 2024 01:04) (18 - 18)    Gen: AAOx3, NAD  Abdomen: Soft, nontender, no distension, gravid  Ext: No calf tenderness, no swelling    EFM: 140/mod/+accels  toco: irritable  SVE: deferred  Speculum: deferred  BSS: vertex, posterior placenta, MVP 3.0cm, BPP 8/8  EFW by leopold: 2800

## 2024-09-25 ENCOUNTER — APPOINTMENT (OUTPATIENT)
Dept: OBGYN | Facility: CLINIC | Age: 29
End: 2024-09-25
Payer: MEDICAID

## 2024-09-25 ENCOUNTER — OUTPATIENT (OUTPATIENT)
Dept: OUTPATIENT SERVICES | Facility: HOSPITAL | Age: 29
LOS: 1 days | End: 2024-09-25
Payer: MEDICAID

## 2024-09-25 VITALS — DIASTOLIC BLOOD PRESSURE: 78 MMHG | SYSTOLIC BLOOD PRESSURE: 120 MMHG | WEIGHT: 277 LBS | BODY MASS INDEX: 46.1 KG/M2

## 2024-09-25 DIAGNOSIS — O99.213 OBESITY COMPLICATING PREGNANCY, THIRD TRIMESTER: ICD-10-CM

## 2024-09-25 DIAGNOSIS — Z98.890 OTHER SPECIFIED POSTPROCEDURAL STATES: Chronic | ICD-10-CM

## 2024-09-25 DIAGNOSIS — Z34.90 ENCOUNTER FOR SUPERVISION OF NORMAL PREGNANCY, UNSPECIFIED, UNSPECIFIED TRIMESTER: ICD-10-CM

## 2024-09-25 DIAGNOSIS — Z3A.35 35 WEEKS GESTATION OF PREGNANCY: ICD-10-CM

## 2024-09-25 DIAGNOSIS — Z90.49 ACQUIRED ABSENCE OF OTHER SPECIFIED PARTS OF DIGESTIVE TRACT: Chronic | ICD-10-CM

## 2024-09-25 PROCEDURE — 81002 URINALYSIS NONAUTO W/O SCOPE: CPT

## 2024-09-25 PROCEDURE — 99213 OFFICE O/P EST LOW 20 MIN: CPT

## 2024-09-25 RX ORDER — HYDROCORTISONE 10 MG/G
1 CREAM TOPICAL
Qty: 1 | Refills: 2 | Status: ACTIVE | COMMUNITY
Start: 2024-09-25 | End: 1900-01-01

## 2024-09-26 DIAGNOSIS — O36.8130 DECREASED FETAL MOVEMENTS, THIRD TRIMESTER, NOT APPLICABLE OR UNSPECIFIED: ICD-10-CM

## 2024-09-26 DIAGNOSIS — E66.9 OBESITY, UNSPECIFIED: ICD-10-CM

## 2024-09-26 DIAGNOSIS — R03.0 ELEVATED BLOOD-PRESSURE READING, WITHOUT DIAGNOSIS OF HYPERTENSION: ICD-10-CM

## 2024-09-26 DIAGNOSIS — O99.213 OBESITY COMPLICATING PREGNANCY, THIRD TRIMESTER: ICD-10-CM

## 2024-09-26 DIAGNOSIS — Z3A.35 35 WEEKS GESTATION OF PREGNANCY: ICD-10-CM

## 2024-09-26 DIAGNOSIS — O26.893 OTHER SPECIFIED PREGNANCY RELATED CONDITIONS, THIRD TRIMESTER: ICD-10-CM

## 2024-09-27 DIAGNOSIS — Z34.90 ENCOUNTER FOR SUPERVISION OF NORMAL PREGNANCY, UNSPECIFIED, UNSPECIFIED TRIMESTER: ICD-10-CM

## 2024-09-30 ENCOUNTER — OUTPATIENT (OUTPATIENT)
Dept: OUTPATIENT SERVICES | Facility: HOSPITAL | Age: 29
LOS: 1 days | End: 2024-09-30
Payer: MEDICAID

## 2024-09-30 ENCOUNTER — ASOB RESULT (OUTPATIENT)
Age: 29
End: 2024-09-30

## 2024-09-30 ENCOUNTER — APPOINTMENT (OUTPATIENT)
Dept: ANTEPARTUM | Facility: CLINIC | Age: 29
End: 2024-09-30
Payer: MEDICAID

## 2024-09-30 DIAGNOSIS — Z03.74 ENCOUNTER FOR SUSPECTED PROBLEM WITH FETAL GROWTH RULED OUT: ICD-10-CM

## 2024-09-30 DIAGNOSIS — Z3A.36 36 WEEKS GESTATION OF PREGNANCY: ICD-10-CM

## 2024-09-30 DIAGNOSIS — Z98.890 OTHER SPECIFIED POSTPROCEDURAL STATES: Chronic | ICD-10-CM

## 2024-09-30 DIAGNOSIS — Z34.90 ENCOUNTER FOR SUPERVISION OF NORMAL PREGNANCY, UNSPECIFIED, UNSPECIFIED TRIMESTER: ICD-10-CM

## 2024-09-30 DIAGNOSIS — O99.213 OBESITY COMPLICATING PREGNANCY, THIRD TRIMESTER: ICD-10-CM

## 2024-09-30 PROCEDURE — 76816 OB US FOLLOW-UP PER FETUS: CPT | Mod: 26

## 2024-09-30 PROCEDURE — 76818 FETAL BIOPHYS PROFILE W/NST: CPT | Mod: 26,59

## 2024-09-30 PROCEDURE — 76816 OB US FOLLOW-UP PER FETUS: CPT

## 2024-09-30 PROCEDURE — 76818 FETAL BIOPHYS PROFILE W/NST: CPT

## 2024-10-01 ENCOUNTER — OUTPATIENT (OUTPATIENT)
Dept: OUTPATIENT SERVICES | Facility: HOSPITAL | Age: 29
LOS: 1 days | End: 2024-10-01
Payer: MEDICAID

## 2024-10-01 ENCOUNTER — APPOINTMENT (OUTPATIENT)
Dept: OBGYN | Facility: CLINIC | Age: 29
End: 2024-10-01
Payer: MEDICAID

## 2024-10-01 ENCOUNTER — NON-APPOINTMENT (OUTPATIENT)
Age: 29
End: 2024-10-01

## 2024-10-01 DIAGNOSIS — Z34.90 ENCOUNTER FOR SUPERVISION OF NORMAL PREGNANCY, UNSPECIFIED, UNSPECIFIED TRIMESTER: ICD-10-CM

## 2024-10-01 DIAGNOSIS — Z98.890 OTHER SPECIFIED POSTPROCEDURAL STATES: Chronic | ICD-10-CM

## 2024-10-01 PROCEDURE — 81513 NFCT DS BV RNA VAG FLU ALG: CPT

## 2024-10-01 PROCEDURE — 87653 STREP B DNA AMP PROBE: CPT

## 2024-10-01 PROCEDURE — 87481 CANDIDA DNA AMP PROBE: CPT

## 2024-10-01 PROCEDURE — 99213 OFFICE O/P EST LOW 20 MIN: CPT

## 2024-10-01 PROCEDURE — 87661 TRICHOMONAS VAGINALIS AMPLIF: CPT

## 2024-10-01 PROCEDURE — 86803 HEPATITIS C AB TEST: CPT

## 2024-10-01 PROCEDURE — 81002 URINALYSIS NONAUTO W/O SCOPE: CPT

## 2024-10-01 PROCEDURE — 87491 CHLMYD TRACH DNA AMP PROBE: CPT

## 2024-10-01 PROCEDURE — 36415 COLL VENOUS BLD VENIPUNCTURE: CPT

## 2024-10-01 PROCEDURE — 87591 N.GONORRHOEAE DNA AMP PROB: CPT

## 2024-10-01 PROCEDURE — 85027 COMPLETE CBC AUTOMATED: CPT

## 2024-10-02 DIAGNOSIS — Z34.90 ENCOUNTER FOR SUPERVISION OF NORMAL PREGNANCY, UNSPECIFIED, UNSPECIFIED TRIMESTER: ICD-10-CM

## 2024-10-04 LAB
BV BACTERIA RRNA VAG QL NAA+PROBE: NOT DETECTED
C GLABRATA RNA VAG QL NAA+PROBE: NOT DETECTED
C TRACH RRNA SPEC QL NAA+PROBE: NOT DETECTED
CANDIDA RRNA VAG QL PROBE: NOT DETECTED
GP B STREP DNA SPEC QL NAA+PROBE: NOT DETECTED
N GONORRHOEA RRNA SPEC QL NAA+PROBE: NOT DETECTED
SOURCE GBS: NORMAL
T VAGINALIS RRNA SPEC QL NAA+PROBE: NOT DETECTED

## 2024-10-07 ENCOUNTER — APPOINTMENT (OUTPATIENT)
Dept: ANTEPARTUM | Facility: CLINIC | Age: 29
End: 2024-10-07
Payer: MEDICAID

## 2024-10-07 ENCOUNTER — ASOB RESULT (OUTPATIENT)
Age: 29
End: 2024-10-07

## 2024-10-07 ENCOUNTER — OUTPATIENT (OUTPATIENT)
Dept: OUTPATIENT SERVICES | Facility: HOSPITAL | Age: 29
LOS: 1 days | End: 2024-10-07
Payer: MEDICAID

## 2024-10-07 DIAGNOSIS — Z34.90 ENCOUNTER FOR SUPERVISION OF NORMAL PREGNANCY, UNSPECIFIED, UNSPECIFIED TRIMESTER: ICD-10-CM

## 2024-10-07 DIAGNOSIS — Z98.890 OTHER SPECIFIED POSTPROCEDURAL STATES: Chronic | ICD-10-CM

## 2024-10-07 PROCEDURE — 76819 FETAL BIOPHYS PROFIL W/O NST: CPT | Mod: 26

## 2024-10-07 PROCEDURE — 76819 FETAL BIOPHYS PROFIL W/O NST: CPT

## 2024-10-09 ENCOUNTER — NON-APPOINTMENT (OUTPATIENT)
Age: 29
End: 2024-10-09

## 2024-10-09 ENCOUNTER — OUTPATIENT (OUTPATIENT)
Dept: OUTPATIENT SERVICES | Facility: HOSPITAL | Age: 29
LOS: 1 days | End: 2024-10-09
Payer: MEDICAID

## 2024-10-09 ENCOUNTER — APPOINTMENT (OUTPATIENT)
Dept: OBGYN | Facility: CLINIC | Age: 29
End: 2024-10-09
Payer: MEDICAID

## 2024-10-09 VITALS
WEIGHT: 280 LBS | HEIGHT: 65 IN | SYSTOLIC BLOOD PRESSURE: 120 MMHG | BODY MASS INDEX: 46.65 KG/M2 | DIASTOLIC BLOOD PRESSURE: 70 MMHG

## 2024-10-09 DIAGNOSIS — Z3A.37 37 WEEKS GESTATION OF PREGNANCY: ICD-10-CM

## 2024-10-09 DIAGNOSIS — O99.213 OBESITY COMPLICATING PREGNANCY, THIRD TRIMESTER: ICD-10-CM

## 2024-10-09 DIAGNOSIS — O36.63X0 MATERNAL CARE FOR EXCESSIVE FETAL GROWTH, THIRD TRIMESTER, NOT APPLICABLE OR UNSPECIFIED: ICD-10-CM

## 2024-10-09 DIAGNOSIS — Z98.890 OTHER SPECIFIED POSTPROCEDURAL STATES: Chronic | ICD-10-CM

## 2024-10-09 DIAGNOSIS — Z34.90 ENCOUNTER FOR SUPERVISION OF NORMAL PREGNANCY, UNSPECIFIED, UNSPECIFIED TRIMESTER: ICD-10-CM

## 2024-10-09 PROCEDURE — 99213 OFFICE O/P EST LOW 20 MIN: CPT

## 2024-10-09 PROCEDURE — 81002 URINALYSIS NONAUTO W/O SCOPE: CPT

## 2024-10-11 DIAGNOSIS — Z34.90 ENCOUNTER FOR SUPERVISION OF NORMAL PREGNANCY, UNSPECIFIED, UNSPECIFIED TRIMESTER: ICD-10-CM

## 2024-10-15 ENCOUNTER — OUTPATIENT (OUTPATIENT)
Dept: OUTPATIENT SERVICES | Facility: HOSPITAL | Age: 29
LOS: 1 days | End: 2024-10-15
Payer: MEDICAID

## 2024-10-15 ENCOUNTER — APPOINTMENT (OUTPATIENT)
Dept: ANTEPARTUM | Facility: CLINIC | Age: 29
End: 2024-10-15
Payer: MEDICAID

## 2024-10-15 ENCOUNTER — ASOB RESULT (OUTPATIENT)
Age: 29
End: 2024-10-15

## 2024-10-15 DIAGNOSIS — Z98.890 OTHER SPECIFIED POSTPROCEDURAL STATES: Chronic | ICD-10-CM

## 2024-10-15 DIAGNOSIS — Z34.90 ENCOUNTER FOR SUPERVISION OF NORMAL PREGNANCY, UNSPECIFIED, UNSPECIFIED TRIMESTER: ICD-10-CM

## 2024-10-15 PROCEDURE — 76819 FETAL BIOPHYS PROFIL W/O NST: CPT | Mod: 26

## 2024-10-15 PROCEDURE — 76819 FETAL BIOPHYS PROFIL W/O NST: CPT

## 2024-10-16 ENCOUNTER — APPOINTMENT (OUTPATIENT)
Dept: OBGYN | Facility: CLINIC | Age: 29
End: 2024-10-16
Payer: COMMERCIAL

## 2024-10-16 ENCOUNTER — NON-APPOINTMENT (OUTPATIENT)
Age: 29
End: 2024-10-16

## 2024-10-16 ENCOUNTER — OUTPATIENT (OUTPATIENT)
Dept: OUTPATIENT SERVICES | Facility: HOSPITAL | Age: 29
LOS: 1 days | End: 2024-10-16
Payer: COMMERCIAL

## 2024-10-16 VITALS
SYSTOLIC BLOOD PRESSURE: 122 MMHG | WEIGHT: 284 LBS | HEIGHT: 65 IN | DIASTOLIC BLOOD PRESSURE: 74 MMHG | BODY MASS INDEX: 47.32 KG/M2

## 2024-10-16 DIAGNOSIS — Z34.90 ENCOUNTER FOR SUPERVISION OF NORMAL PREGNANCY, UNSPECIFIED, UNSPECIFIED TRIMESTER: ICD-10-CM

## 2024-10-16 DIAGNOSIS — Z98.890 OTHER SPECIFIED POSTPROCEDURAL STATES: Chronic | ICD-10-CM

## 2024-10-16 PROCEDURE — 99213 OFFICE O/P EST LOW 20 MIN: CPT

## 2024-10-17 DIAGNOSIS — O36.63X0 MATERNAL CARE FOR EXCESSIVE FETAL GROWTH, THIRD TRIMESTER, NOT APPLICABLE OR UNSPECIFIED: ICD-10-CM

## 2024-10-17 DIAGNOSIS — O99.213 OBESITY COMPLICATING PREGNANCY, THIRD TRIMESTER: ICD-10-CM

## 2024-10-17 DIAGNOSIS — Z34.90 ENCOUNTER FOR SUPERVISION OF NORMAL PREGNANCY, UNSPECIFIED, UNSPECIFIED TRIMESTER: ICD-10-CM

## 2024-10-17 DIAGNOSIS — Z3A.38 38 WEEKS GESTATION OF PREGNANCY: ICD-10-CM

## 2024-10-18 PROBLEM — Z78.9 OTHER SPECIFIED HEALTH STATUS: Chronic | Status: ACTIVE | Noted: 2024-09-24

## 2024-10-18 LAB
BILIRUB UR QL STRIP: NORMAL
CLARITY UR: CLEAR
COLLECTION METHOD: NORMAL
GLUCOSE UR-MCNC: NORMAL
HCG UR QL: 0.2 EU/DL
HGB UR QL STRIP.AUTO: NORMAL
KETONES UR-MCNC: NORMAL
LEUKOCYTE ESTERASE UR QL STRIP: NORMAL
NITRITE UR QL STRIP: NORMAL
PH UR STRIP: 6
PROT UR STRIP-MCNC: NORMAL
SP GR UR STRIP: 1.01

## 2024-10-21 ENCOUNTER — ASOB RESULT (OUTPATIENT)
Age: 29
End: 2024-10-21

## 2024-10-21 ENCOUNTER — NON-APPOINTMENT (OUTPATIENT)
Age: 29
End: 2024-10-21

## 2024-10-21 ENCOUNTER — OUTPATIENT (OUTPATIENT)
Dept: OUTPATIENT SERVICES | Facility: HOSPITAL | Age: 29
LOS: 1 days | End: 2024-10-21
Payer: COMMERCIAL

## 2024-10-21 ENCOUNTER — APPOINTMENT (OUTPATIENT)
Dept: ANTEPARTUM | Facility: CLINIC | Age: 29
End: 2024-10-21
Payer: MEDICAID

## 2024-10-21 VITALS — HEART RATE: 102 BPM | DIASTOLIC BLOOD PRESSURE: 85 MMHG | SYSTOLIC BLOOD PRESSURE: 129 MMHG

## 2024-10-21 DIAGNOSIS — Z34.90 ENCOUNTER FOR SUPERVISION OF NORMAL PREGNANCY, UNSPECIFIED, UNSPECIFIED TRIMESTER: ICD-10-CM

## 2024-10-21 DIAGNOSIS — Z98.890 OTHER SPECIFIED POSTPROCEDURAL STATES: Chronic | ICD-10-CM

## 2024-10-21 PROCEDURE — 76816 OB US FOLLOW-UP PER FETUS: CPT | Mod: 26

## 2024-10-21 PROCEDURE — 76818 FETAL BIOPHYS PROFILE W/NST: CPT | Mod: 26

## 2024-10-21 PROCEDURE — 76818 FETAL BIOPHYS PROFILE W/NST: CPT

## 2024-10-21 PROCEDURE — 76816 OB US FOLLOW-UP PER FETUS: CPT

## 2024-10-22 ENCOUNTER — NON-APPOINTMENT (OUTPATIENT)
Age: 29
End: 2024-10-22

## 2024-10-23 ENCOUNTER — APPOINTMENT (OUTPATIENT)
Dept: OBGYN | Facility: CLINIC | Age: 29
End: 2024-10-23
Payer: MEDICAID

## 2024-10-23 ENCOUNTER — OUTPATIENT (OUTPATIENT)
Dept: OUTPATIENT SERVICES | Facility: HOSPITAL | Age: 29
LOS: 1 days | End: 2024-10-23
Payer: COMMERCIAL

## 2024-10-23 VITALS
SYSTOLIC BLOOD PRESSURE: 132 MMHG | HEIGHT: 65 IN | WEIGHT: 285 LBS | DIASTOLIC BLOOD PRESSURE: 84 MMHG | HEART RATE: 94 BPM | BODY MASS INDEX: 47.48 KG/M2

## 2024-10-23 DIAGNOSIS — Z98.890 OTHER SPECIFIED POSTPROCEDURAL STATES: Chronic | ICD-10-CM

## 2024-10-23 DIAGNOSIS — Z34.90 ENCOUNTER FOR SUPERVISION OF NORMAL PREGNANCY, UNSPECIFIED, UNSPECIFIED TRIMESTER: ICD-10-CM

## 2024-10-23 PROCEDURE — 81002 URINALYSIS NONAUTO W/O SCOPE: CPT

## 2024-10-23 PROCEDURE — 99213 OFFICE O/P EST LOW 20 MIN: CPT

## 2024-10-24 ENCOUNTER — INPATIENT (INPATIENT)
Facility: HOSPITAL | Age: 29
LOS: 1 days | Discharge: HOME | DRG: 560 | End: 2024-10-26
Attending: OBSTETRICS & GYNECOLOGY | Admitting: OBSTETRICS & GYNECOLOGY
Payer: MEDICAID

## 2024-10-24 ENCOUNTER — RESULT REVIEW (OUTPATIENT)
Age: 29
End: 2024-10-24

## 2024-10-24 DIAGNOSIS — Z34.90 ENCOUNTER FOR SUPERVISION OF NORMAL PREGNANCY, UNSPECIFIED, UNSPECIFIED TRIMESTER: ICD-10-CM

## 2024-10-24 DIAGNOSIS — O99.213 OBESITY COMPLICATING PREGNANCY, THIRD TRIMESTER: ICD-10-CM

## 2024-10-24 DIAGNOSIS — Z3A.39 39 WEEKS GESTATION OF PREGNANCY: ICD-10-CM

## 2024-10-24 DIAGNOSIS — Z98.890 OTHER SPECIFIED POSTPROCEDURAL STATES: Chronic | ICD-10-CM

## 2024-10-24 DIAGNOSIS — O36.63X0 MATERNAL CARE FOR EXCESSIVE FETAL GROWTH, THIRD TRIMESTER, NOT APPLICABLE OR UNSPECIFIED: ICD-10-CM

## 2024-10-24 LAB
ALBUMIN SERPL ELPH-MCNC: 3.4 G/DL — LOW (ref 3.5–5.2)
ALP SERPL-CCNC: 227 U/L — HIGH (ref 30–115)
ALT FLD-CCNC: 28 U/L — SIGNIFICANT CHANGE UP (ref 0–41)
ANION GAP SERPL CALC-SCNC: 11 MMOL/L — SIGNIFICANT CHANGE UP (ref 7–14)
APPEARANCE UR: CLEAR — SIGNIFICANT CHANGE UP
AST SERPL-CCNC: 36 U/L — SIGNIFICANT CHANGE UP (ref 0–41)
BASOPHILS # BLD AUTO: 0.03 K/UL — SIGNIFICANT CHANGE UP (ref 0–0.2)
BASOPHILS # BLD AUTO: 0.03 K/UL — SIGNIFICANT CHANGE UP (ref 0–0.2)
BASOPHILS NFR BLD AUTO: 0.2 % — SIGNIFICANT CHANGE UP (ref 0–1)
BASOPHILS NFR BLD AUTO: 0.3 % — SIGNIFICANT CHANGE UP (ref 0–1)
BILIRUB SERPL-MCNC: <0.2 MG/DL — SIGNIFICANT CHANGE UP (ref 0.2–1.2)
BILIRUB UR-MCNC: NEGATIVE — SIGNIFICANT CHANGE UP
BUN SERPL-MCNC: 13 MG/DL — SIGNIFICANT CHANGE UP (ref 10–20)
CALCIUM SERPL-MCNC: 9.1 MG/DL — SIGNIFICANT CHANGE UP (ref 8.4–10.4)
CHLORIDE SERPL-SCNC: 107 MMOL/L — SIGNIFICANT CHANGE UP (ref 98–110)
CO2 SERPL-SCNC: 20 MMOL/L — SIGNIFICANT CHANGE UP (ref 17–32)
COLOR SPEC: YELLOW — SIGNIFICANT CHANGE UP
CREAT ?TM UR-MCNC: 24 MG/DL — SIGNIFICANT CHANGE UP
CREAT SERPL-MCNC: 0.6 MG/DL — LOW (ref 0.7–1.5)
DIFF PNL FLD: NEGATIVE — SIGNIFICANT CHANGE UP
EGFR: 123 ML/MIN/1.73M2 — SIGNIFICANT CHANGE UP
EGFR: 123 ML/MIN/1.73M2 — SIGNIFICANT CHANGE UP
EOSINOPHIL # BLD AUTO: 0.01 K/UL — SIGNIFICANT CHANGE UP (ref 0–0.7)
EOSINOPHIL # BLD AUTO: 0.1 K/UL — SIGNIFICANT CHANGE UP (ref 0–0.7)
EOSINOPHIL NFR BLD AUTO: 0.1 % — SIGNIFICANT CHANGE UP (ref 0–8)
EOSINOPHIL NFR BLD AUTO: 0.9 % — SIGNIFICANT CHANGE UP (ref 0–8)
GLUCOSE SERPL-MCNC: 92 MG/DL — SIGNIFICANT CHANGE UP (ref 70–99)
GLUCOSE UR QL: NEGATIVE MG/DL — SIGNIFICANT CHANGE UP
HCT VFR BLD CALC: 32.7 % — LOW (ref 37–47)
HCT VFR BLD CALC: 37.4 % — SIGNIFICANT CHANGE UP (ref 37–47)
HGB BLD-MCNC: 10.9 G/DL — LOW (ref 12–16)
HGB BLD-MCNC: 12.6 G/DL — SIGNIFICANT CHANGE UP (ref 12–16)
HIV 1 & 2 AB SERPL IA.RAPID: SIGNIFICANT CHANGE UP
IMM GRANULOCYTES NFR BLD AUTO: 0.6 % — HIGH (ref 0.1–0.3)
IMM GRANULOCYTES NFR BLD AUTO: 1.1 % — HIGH (ref 0.1–0.3)
KETONES UR-MCNC: NEGATIVE MG/DL — SIGNIFICANT CHANGE UP
L&D DRUG SCREEN, URINE: SIGNIFICANT CHANGE UP
LDH SERPL L TO P-CCNC: 167 — SIGNIFICANT CHANGE UP (ref 50–242)
LEUKOCYTE ESTERASE UR-ACNC: NEGATIVE — SIGNIFICANT CHANGE UP
LYMPHOCYTES # BLD AUTO: 14.2 % — LOW (ref 20.5–51.1)
LYMPHOCYTES # BLD AUTO: 2.15 K/UL — SIGNIFICANT CHANGE UP (ref 1.2–3.4)
LYMPHOCYTES # BLD AUTO: 2.65 K/UL — SIGNIFICANT CHANGE UP (ref 1.2–3.4)
LYMPHOCYTES # BLD AUTO: 23.6 % — SIGNIFICANT CHANGE UP (ref 20.5–51.1)
MCHC RBC-ENTMCNC: 29.8 PG — SIGNIFICANT CHANGE UP (ref 27–31)
MCHC RBC-ENTMCNC: 29.8 PG — SIGNIFICANT CHANGE UP (ref 27–31)
MCHC RBC-ENTMCNC: 33.3 G/DL — SIGNIFICANT CHANGE UP (ref 32–37)
MCHC RBC-ENTMCNC: 33.7 G/DL — SIGNIFICANT CHANGE UP (ref 32–37)
MCV RBC AUTO: 88.4 FL — SIGNIFICANT CHANGE UP (ref 81–99)
MCV RBC AUTO: 89.3 FL — SIGNIFICANT CHANGE UP (ref 81–99)
MONOCYTES # BLD AUTO: 0.78 K/UL — HIGH (ref 0.1–0.6)
MONOCYTES # BLD AUTO: 0.97 K/UL — HIGH (ref 0.1–0.6)
MONOCYTES NFR BLD AUTO: 6.4 % — SIGNIFICANT CHANGE UP (ref 1.7–9.3)
MONOCYTES NFR BLD AUTO: 6.9 % — SIGNIFICANT CHANGE UP (ref 1.7–9.3)
NEUTROPHILS # BLD AUTO: 11.88 K/UL — HIGH (ref 1.4–6.5)
NEUTROPHILS # BLD AUTO: 7.55 K/UL — HIGH (ref 1.4–6.5)
NEUTROPHILS NFR BLD AUTO: 67.2 % — SIGNIFICANT CHANGE UP (ref 42.2–75.2)
NEUTROPHILS NFR BLD AUTO: 78.5 % — HIGH (ref 42.2–75.2)
NITRITE UR-MCNC: NEGATIVE — SIGNIFICANT CHANGE UP
NRBC # BLD: 0 /100 WBCS — SIGNIFICANT CHANGE UP (ref 0–0)
NRBC # BLD: 0 /100 WBCS — SIGNIFICANT CHANGE UP (ref 0–0)
NRBC BLD-RTO: 0 /100 WBCS — SIGNIFICANT CHANGE UP (ref 0–0)
NRBC BLD-RTO: 0 /100 WBCS — SIGNIFICANT CHANGE UP (ref 0–0)
PH UR: 6.5 — SIGNIFICANT CHANGE UP (ref 5–8)
PLATELET # BLD AUTO: 198 K/UL — SIGNIFICANT CHANGE UP (ref 130–400)
PLATELET # BLD AUTO: 210 K/UL — SIGNIFICANT CHANGE UP (ref 130–400)
PMV BLD: 11.3 FL — HIGH (ref 7.4–10.4)
PMV BLD: 11.6 FL — HIGH (ref 7.4–10.4)
POTASSIUM SERPL-MCNC: 4 MMOL/L — SIGNIFICANT CHANGE UP (ref 3.5–5)
POTASSIUM SERPL-SCNC: 4 MMOL/L — SIGNIFICANT CHANGE UP (ref 3.5–5)
PRENATAL SYPHILIS TEST: SIGNIFICANT CHANGE UP
PROT ?TM UR-MCNC: 6 MG/DLG/24H — SIGNIFICANT CHANGE UP
PROT SERPL-MCNC: 6.1 G/DL — SIGNIFICANT CHANGE UP (ref 6–8)
PROT UR-MCNC: NEGATIVE MG/DL — SIGNIFICANT CHANGE UP
PROT/CREAT UR-RTO: 0.2 RATIO — SIGNIFICANT CHANGE UP (ref 0–0.2)
RBC # BLD: 3.66 M/UL — LOW (ref 4.2–5.4)
RBC # BLD: 4.23 M/UL — SIGNIFICANT CHANGE UP (ref 4.2–5.4)
RBC # FLD: 14.1 % — SIGNIFICANT CHANGE UP (ref 11.5–14.5)
RBC # FLD: 14.3 % — SIGNIFICANT CHANGE UP (ref 11.5–14.5)
SODIUM SERPL-SCNC: 138 MMOL/L — SIGNIFICANT CHANGE UP (ref 135–146)
SP GR SPEC: 1.01 — SIGNIFICANT CHANGE UP (ref 1–1.03)
URATE SERPL-MCNC: 4.2 MG/DL — SIGNIFICANT CHANGE UP (ref 2.5–7)
UROBILINOGEN FLD QL: 0.2 MG/DL — SIGNIFICANT CHANGE UP (ref 0.2–1)
WBC # BLD: 11.23 K/UL — HIGH (ref 4.8–10.8)
WBC # BLD: 15.13 K/UL — HIGH (ref 4.8–10.8)
WBC # FLD AUTO: 11.23 K/UL — HIGH (ref 4.8–10.8)
WBC # FLD AUTO: 15.13 K/UL — HIGH (ref 4.8–10.8)

## 2024-10-24 PROCEDURE — 58925 REMOVAL OF OVARIAN CYST(S): CPT

## 2024-10-24 PROCEDURE — 84550 ASSAY OF BLOOD/URIC ACID: CPT

## 2024-10-24 PROCEDURE — 86703 HIV-1/HIV-2 1 RESULT ANTBDY: CPT

## 2024-10-24 PROCEDURE — 86850 RBC ANTIBODY SCREEN: CPT

## 2024-10-24 PROCEDURE — 81003 URINALYSIS AUTO W/O SCOPE: CPT

## 2024-10-24 PROCEDURE — 86900 BLOOD TYPING SEROLOGIC ABO: CPT

## 2024-10-24 PROCEDURE — 59025 FETAL NON-STRESS TEST: CPT

## 2024-10-24 PROCEDURE — 82570 ASSAY OF URINE CREATININE: CPT

## 2024-10-24 PROCEDURE — 80053 COMPREHEN METABOLIC PANEL: CPT

## 2024-10-24 PROCEDURE — 86901 BLOOD TYPING SEROLOGIC RH(D): CPT

## 2024-10-24 PROCEDURE — 84156 ASSAY OF PROTEIN URINE: CPT

## 2024-10-24 PROCEDURE — 88304 TISSUE EXAM BY PATHOLOGIST: CPT

## 2024-10-24 PROCEDURE — 83615 LACTATE (LD) (LDH) ENZYME: CPT

## 2024-10-24 PROCEDURE — 80354 DRUG SCREENING FENTANYL: CPT

## 2024-10-24 PROCEDURE — 85025 COMPLETE CBC W/AUTO DIFF WBC: CPT

## 2024-10-24 PROCEDURE — 36415 COLL VENOUS BLD VENIPUNCTURE: CPT

## 2024-10-24 PROCEDURE — 88304 TISSUE EXAM BY PATHOLOGIST: CPT | Mod: 26

## 2024-10-24 PROCEDURE — 80307 DRUG TEST PRSMV CHEM ANLYZR: CPT

## 2024-10-24 PROCEDURE — 86592 SYPHILIS TEST NON-TREP QUAL: CPT

## 2024-10-24 PROCEDURE — 59514 CESAREAN DELIVERY ONLY: CPT | Mod: U9

## 2024-10-24 RX ORDER — MODIFIED LANOLIN 100 %
1 CREAM (GRAM) TOPICAL EVERY 6 HOURS
Refills: 0 | Status: DISCONTINUED | OUTPATIENT
Start: 2024-10-24 | End: 2024-10-26

## 2024-10-24 RX ORDER — ONDANSETRON HCL/PF 4 MG/2 ML
4 VIAL (ML) INJECTION EVERY 6 HOURS
Refills: 0 | Status: DISCONTINUED | OUTPATIENT
Start: 2024-10-24 | End: 2024-10-26

## 2024-10-24 RX ORDER — DEXAMETHASONE 0.5 MG/1
4 TABLET ORAL EVERY 6 HOURS
Refills: 0 | Status: DISCONTINUED | OUTPATIENT
Start: 2024-10-24 | End: 2024-10-26

## 2024-10-24 RX ORDER — CEFAZOLIN SODIUM IN 0.9 % NACL 3 G/100 ML
2000 INTRAVENOUS SOLUTION, PIGGYBACK (ML) INTRAVENOUS ONCE
Refills: 0 | Status: COMPLETED | OUTPATIENT
Start: 2024-10-24 | End: 2024-10-24

## 2024-10-24 RX ORDER — KETOROLAC TROMETHAMINE 30 MG/ML
30 INJECTION, SOLUTION INTRAMUSCULAR; INTRAVENOUS EVERY 6 HOURS
Refills: 0 | Status: DISCONTINUED | OUTPATIENT
Start: 2024-10-24 | End: 2024-10-25

## 2024-10-24 RX ORDER — NALOXONE HYDROCHLORIDE 0.4 MG/ML
0.1 INJECTION, SOLUTION INTRAMUSCULAR; INTRAVENOUS; SUBCUTANEOUS
Refills: 0 | Status: DISCONTINUED | OUTPATIENT
Start: 2024-10-24 | End: 2024-10-26

## 2024-10-24 RX ORDER — ENOXAPARIN SODIUM 100 MG/ML
40 INJECTION SUBCUTANEOUS EVERY 12 HOURS
Refills: 0 | Status: DISCONTINUED | OUTPATIENT
Start: 2024-10-24 | End: 2024-10-26

## 2024-10-24 RX ORDER — SODIUM CHLORIDE 9 G/1000ML
1000 INJECTION, SOLUTION INTRAVENOUS
Refills: 0 | Status: DISCONTINUED | OUTPATIENT
Start: 2024-10-24 | End: 2024-10-24

## 2024-10-24 RX ORDER — IBUPROFEN 200 MG
600 TABLET ORAL EVERY 6 HOURS
Refills: 0 | Status: COMPLETED | OUTPATIENT
Start: 2024-10-24 | End: 2025-09-22

## 2024-10-24 RX ORDER — MAGNESIUM HYDROXIDE 400 MG/5ML
30 SUSPENSION ORAL
Refills: 0 | Status: DISCONTINUED | OUTPATIENT
Start: 2024-10-24 | End: 2024-10-26

## 2024-10-24 RX ORDER — SIMETHICONE 80 MG
80 TABLET,CHEWABLE ORAL EVERY 4 HOURS
Refills: 0 | Status: DISCONTINUED | OUTPATIENT
Start: 2024-10-24 | End: 2024-10-26

## 2024-10-24 RX ORDER — DIPHENHYDRAMINE HCL 12.5MG/5ML
25 ELIXIR ORAL EVERY 6 HOURS
Refills: 0 | Status: DISCONTINUED | OUTPATIENT
Start: 2024-10-24 | End: 2024-10-26

## 2024-10-24 RX ORDER — PRENATAL 136/IRON/FOLIC ACID 27 MG-1 MG
1 TABLET ORAL DAILY
Refills: 0 | Status: DISCONTINUED | OUTPATIENT
Start: 2024-10-24 | End: 2024-10-26

## 2024-10-24 RX ORDER — SODIUM CHLORIDE 9 G/1000ML
1000 INJECTION, SOLUTION INTRAVENOUS
Refills: 0 | Status: DISCONTINUED | OUTPATIENT
Start: 2024-10-24 | End: 2024-10-26

## 2024-10-24 RX ORDER — OXYCODONE HYDROCHLORIDE 30 MG/1
5 TABLET ORAL
Refills: 0 | Status: COMPLETED | OUTPATIENT
Start: 2024-10-24 | End: 2024-10-31

## 2024-10-24 RX ORDER — ACETAMINOPHEN 500 MG/5ML
975 LIQUID (ML) ORAL
Refills: 0 | Status: DISCONTINUED | OUTPATIENT
Start: 2024-10-24 | End: 2024-10-26

## 2024-10-24 RX ORDER — OXYTOCIN-SODIUM CHLORIDE 0.9% IV SOLN 30 UNIT/500ML 30-0.9/5 UT/ML-%
167 SOLUTION INTRAVENOUS
Qty: 30 | Refills: 0 | Status: DISCONTINUED | OUTPATIENT
Start: 2024-10-24 | End: 2024-10-24

## 2024-10-24 RX ORDER — OXYCODONE HYDROCHLORIDE 30 MG/1
5 TABLET ORAL ONCE
Refills: 0 | Status: DISCONTINUED | OUTPATIENT
Start: 2024-10-24 | End: 2024-10-26

## 2024-10-24 RX ADMIN — SODIUM CHLORIDE 125 MILLILITER(S): 9 INJECTION, SOLUTION INTRAVENOUS at 06:09

## 2024-10-24 RX ADMIN — Medication 1 TABLET(S): at 14:10

## 2024-10-24 RX ADMIN — KETOROLAC TROMETHAMINE 30 MILLIGRAM(S): 30 INJECTION, SOLUTION INTRAMUSCULAR; INTRAVENOUS at 20:06

## 2024-10-24 RX ADMIN — Medication 975 MILLIGRAM(S): at 17:43

## 2024-10-24 RX ADMIN — Medication 80 MILLIGRAM(S): at 17:43

## 2024-10-24 RX ADMIN — KETOROLAC TROMETHAMINE 30 MILLIGRAM(S): 30 INJECTION, SOLUTION INTRAMUSCULAR; INTRAVENOUS at 14:11

## 2024-10-24 RX ADMIN — Medication 975 MILLIGRAM(S): at 23:33

## 2024-10-24 RX ADMIN — Medication 80 MILLIGRAM(S): at 14:10

## 2024-10-24 RX ADMIN — KETOROLAC TROMETHAMINE 30 MILLIGRAM(S): 30 INJECTION, SOLUTION INTRAMUSCULAR; INTRAVENOUS at 19:48

## 2024-10-24 RX ADMIN — MAGNESIUM HYDROXIDE 30 MILLILITER(S): 400 SUSPENSION ORAL at 23:33

## 2024-10-24 RX ADMIN — SODIUM CHLORIDE 125 MILLILITER(S): 9 INJECTION, SOLUTION INTRAVENOUS at 18:43

## 2024-10-24 RX ADMIN — ENOXAPARIN SODIUM 40 MILLIGRAM(S): 100 INJECTION SUBCUTANEOUS at 21:38

## 2024-10-24 RX ADMIN — KETOROLAC TROMETHAMINE 30 MILLIGRAM(S): 30 INJECTION, SOLUTION INTRAMUSCULAR; INTRAVENOUS at 15:11

## 2024-10-24 RX ADMIN — Medication 100 MILLIGRAM(S): at 07:58

## 2024-10-24 RX ADMIN — Medication 80 MILLIGRAM(S): at 21:38

## 2024-10-25 ENCOUNTER — NON-APPOINTMENT (OUTPATIENT)
Age: 29
End: 2024-10-25

## 2024-10-25 ENCOUNTER — TRANSCRIPTION ENCOUNTER (OUTPATIENT)
Age: 29
End: 2024-10-25

## 2024-10-25 LAB
BILIRUB UR QL STRIP: NORMAL
CLARITY UR: CLEAR
GLUCOSE UR-MCNC: NORMAL
HCG UR QL: 0.2 EU/DL
HGB UR QL STRIP.AUTO: NORMAL
KETONES UR-MCNC: NORMAL
LEUKOCYTE ESTERASE UR QL STRIP: NORMAL
NITRITE UR QL STRIP: NORMAL
PH UR STRIP: 7
PROT UR STRIP-MCNC: NORMAL
SP GR UR STRIP: 1

## 2024-10-25 RX ORDER — SIMETHICONE 80 MG
1 TABLET,CHEWABLE ORAL
Qty: 0 | Refills: 0 | DISCHARGE
Start: 2024-10-25

## 2024-10-25 RX ORDER — OXYCODONE HYDROCHLORIDE 30 MG/1
5 TABLET ORAL ONCE
Refills: 0 | Status: DISCONTINUED | OUTPATIENT
Start: 2024-10-25 | End: 2024-10-26

## 2024-10-25 RX ORDER — OXYCODONE HYDROCHLORIDE 30 MG/1
5 TABLET ORAL
Refills: 0 | Status: DISCONTINUED | OUTPATIENT
Start: 2024-10-25 | End: 2024-10-26

## 2024-10-25 RX ORDER — IBUPROFEN 200 MG
1 TABLET ORAL
Qty: 0 | Refills: 0 | DISCHARGE
Start: 2024-10-25

## 2024-10-25 RX ORDER — PRENATAL 136/IRON/FOLIC ACID 27 MG-1 MG
1 TABLET ORAL
Qty: 0 | Refills: 0 | DISCHARGE
Start: 2024-10-25

## 2024-10-25 RX ORDER — ACETAMINOPHEN 500 MG/5ML
3 LIQUID (ML) ORAL
Qty: 0 | Refills: 0 | DISCHARGE
Start: 2024-10-25

## 2024-10-25 RX ORDER — MODIFIED LANOLIN 100 %
1 CREAM (GRAM) TOPICAL
Qty: 0 | Refills: 0 | DISCHARGE
Start: 2024-10-25

## 2024-10-25 RX ORDER — IBUPROFEN 200 MG
600 TABLET ORAL EVERY 6 HOURS
Refills: 0 | Status: DISCONTINUED | OUTPATIENT
Start: 2024-10-25 | End: 2024-10-26

## 2024-10-25 RX ADMIN — Medication 975 MILLIGRAM(S): at 12:14

## 2024-10-25 RX ADMIN — Medication 80 MILLIGRAM(S): at 01:27

## 2024-10-25 RX ADMIN — Medication 600 MILLIGRAM(S): at 14:54

## 2024-10-25 RX ADMIN — KETOROLAC TROMETHAMINE 30 MILLIGRAM(S): 30 INJECTION, SOLUTION INTRAMUSCULAR; INTRAVENOUS at 09:28

## 2024-10-25 RX ADMIN — Medication 600 MILLIGRAM(S): at 15:54

## 2024-10-25 RX ADMIN — Medication 975 MILLIGRAM(S): at 00:18

## 2024-10-25 RX ADMIN — Medication 1 TABLET(S): at 12:15

## 2024-10-25 RX ADMIN — Medication 80 MILLIGRAM(S): at 06:27

## 2024-10-25 RX ADMIN — Medication 80 MILLIGRAM(S): at 09:25

## 2024-10-25 RX ADMIN — Medication 975 MILLIGRAM(S): at 06:58

## 2024-10-25 RX ADMIN — Medication 600 MILLIGRAM(S): at 22:22

## 2024-10-25 RX ADMIN — Medication 975 MILLIGRAM(S): at 06:27

## 2024-10-25 RX ADMIN — ENOXAPARIN SODIUM 40 MILLIGRAM(S): 100 INJECTION SUBCUTANEOUS at 22:22

## 2024-10-25 RX ADMIN — KETOROLAC TROMETHAMINE 30 MILLIGRAM(S): 30 INJECTION, SOLUTION INTRAMUSCULAR; INTRAVENOUS at 01:27

## 2024-10-25 RX ADMIN — ENOXAPARIN SODIUM 40 MILLIGRAM(S): 100 INJECTION SUBCUTANEOUS at 09:25

## 2024-10-25 RX ADMIN — Medication 80 MILLIGRAM(S): at 18:27

## 2024-10-25 RX ADMIN — Medication 975 MILLIGRAM(S): at 13:14

## 2024-10-25 RX ADMIN — KETOROLAC TROMETHAMINE 30 MILLIGRAM(S): 30 INJECTION, SOLUTION INTRAMUSCULAR; INTRAVENOUS at 01:50

## 2024-10-25 RX ADMIN — Medication 975 MILLIGRAM(S): at 18:27

## 2024-10-25 RX ADMIN — Medication 600 MILLIGRAM(S): at 23:22

## 2024-10-25 RX ADMIN — Medication 80 MILLIGRAM(S): at 22:22

## 2024-10-25 RX ADMIN — OXYCODONE HYDROCHLORIDE 5 MILLIGRAM(S): 30 TABLET ORAL at 22:30

## 2024-10-25 RX ADMIN — KETOROLAC TROMETHAMINE 30 MILLIGRAM(S): 30 INJECTION, SOLUTION INTRAMUSCULAR; INTRAVENOUS at 08:28

## 2024-10-25 RX ADMIN — Medication 80 MILLIGRAM(S): at 14:54

## 2024-10-26 VITALS
HEART RATE: 84 BPM | TEMPERATURE: 98 F | DIASTOLIC BLOOD PRESSURE: 81 MMHG | RESPIRATION RATE: 18 BRPM | OXYGEN SATURATION: 96 % | SYSTOLIC BLOOD PRESSURE: 126 MMHG

## 2024-10-26 RX ADMIN — Medication 600 MILLIGRAM(S): at 16:07

## 2024-10-26 RX ADMIN — Medication 80 MILLIGRAM(S): at 08:51

## 2024-10-26 RX ADMIN — Medication 600 MILLIGRAM(S): at 15:07

## 2024-10-26 RX ADMIN — Medication 80 MILLIGRAM(S): at 03:15

## 2024-10-26 RX ADMIN — Medication 80 MILLIGRAM(S): at 18:02

## 2024-10-26 RX ADMIN — Medication 1 TABLET(S): at 12:14

## 2024-10-26 RX ADMIN — ENOXAPARIN SODIUM 40 MILLIGRAM(S): 100 INJECTION SUBCUTANEOUS at 08:51

## 2024-10-26 RX ADMIN — Medication 600 MILLIGRAM(S): at 09:52

## 2024-10-26 RX ADMIN — Medication 975 MILLIGRAM(S): at 00:42

## 2024-10-26 RX ADMIN — Medication 975 MILLIGRAM(S): at 18:02

## 2024-10-26 RX ADMIN — Medication 600 MILLIGRAM(S): at 04:15

## 2024-10-26 RX ADMIN — Medication 600 MILLIGRAM(S): at 03:15

## 2024-10-26 RX ADMIN — Medication 975 MILLIGRAM(S): at 06:05

## 2024-10-26 RX ADMIN — Medication 600 MILLIGRAM(S): at 08:52

## 2024-10-26 RX ADMIN — Medication 80 MILLIGRAM(S): at 15:08

## 2024-10-26 RX ADMIN — Medication 80 MILLIGRAM(S): at 12:14

## 2024-10-26 RX ADMIN — Medication 975 MILLIGRAM(S): at 01:42

## 2024-10-28 ENCOUNTER — APPOINTMENT (OUTPATIENT)
Dept: ANTEPARTUM | Facility: CLINIC | Age: 29
End: 2024-10-28

## 2024-10-29 LAB — SURGICAL PATHOLOGY STUDY: SIGNIFICANT CHANGE UP

## 2024-10-30 ENCOUNTER — APPOINTMENT (OUTPATIENT)
Dept: OBGYN | Facility: CLINIC | Age: 29
End: 2024-10-30
Payer: COMMERCIAL

## 2024-10-30 ENCOUNTER — OUTPATIENT (OUTPATIENT)
Dept: OUTPATIENT SERVICES | Facility: HOSPITAL | Age: 29
LOS: 1 days | End: 2024-10-30
Payer: COMMERCIAL

## 2024-10-30 VITALS
SYSTOLIC BLOOD PRESSURE: 110 MMHG | BODY MASS INDEX: 45.65 KG/M2 | WEIGHT: 274 LBS | DIASTOLIC BLOOD PRESSURE: 80 MMHG | HEIGHT: 65 IN

## 2024-10-30 DIAGNOSIS — Z98.890 OTHER SPECIFIED POSTPROCEDURAL STATES: Chronic | ICD-10-CM

## 2024-10-30 PROCEDURE — 81002 URINALYSIS NONAUTO W/O SCOPE: CPT

## 2024-10-30 PROCEDURE — 99212 OFFICE O/P EST SF 10 MIN: CPT

## 2024-10-30 PROCEDURE — 99212 OFFICE O/P EST SF 10 MIN: CPT | Mod: 25

## 2024-11-01 DIAGNOSIS — O36.63X0 MATERNAL CARE FOR EXCESSIVE FETAL GROWTH, THIRD TRIMESTER, NOT APPLICABLE OR UNSPECIFIED: ICD-10-CM

## 2024-11-01 DIAGNOSIS — O34.83 MATERNAL CARE FOR OTHER ABNORMALITIES OF PELVIC ORGANS, THIRD TRIMESTER: ICD-10-CM

## 2024-11-01 DIAGNOSIS — O26.893 OTHER SPECIFIED PREGNANCY RELATED CONDITIONS, THIRD TRIMESTER: ICD-10-CM

## 2024-11-01 DIAGNOSIS — Z3A.39 39 WEEKS GESTATION OF PREGNANCY: ICD-10-CM

## 2024-11-01 DIAGNOSIS — N83.202 UNSPECIFIED OVARIAN CYST, LEFT SIDE: ICD-10-CM

## 2024-11-01 DIAGNOSIS — R03.0 ELEVATED BLOOD-PRESSURE READING, WITHOUT DIAGNOSIS OF HYPERTENSION: ICD-10-CM

## 2024-11-20 ENCOUNTER — NON-APPOINTMENT (OUTPATIENT)
Age: 29
End: 2024-11-20

## 2024-11-22 ENCOUNTER — NON-APPOINTMENT (OUTPATIENT)
Age: 29
End: 2024-11-22

## 2024-12-04 ENCOUNTER — OUTPATIENT (OUTPATIENT)
Dept: OUTPATIENT SERVICES | Facility: HOSPITAL | Age: 29
LOS: 1 days | End: 2024-12-04
Payer: MEDICAID

## 2024-12-04 ENCOUNTER — APPOINTMENT (OUTPATIENT)
Dept: OBGYN | Facility: CLINIC | Age: 29
End: 2024-12-04

## 2024-12-04 ENCOUNTER — APPOINTMENT (OUTPATIENT)
Dept: OBGYN | Facility: CLINIC | Age: 29
End: 2024-12-04
Payer: MEDICAID

## 2024-12-04 PROCEDURE — 99212 OFFICE O/P EST SF 10 MIN: CPT | Mod: 24,95

## 2024-12-04 PROCEDURE — 99213 OFFICE O/P EST LOW 20 MIN: CPT | Mod: 95

## 2024-12-18 ENCOUNTER — APPOINTMENT (OUTPATIENT)
Dept: OTOLARYNGOLOGY | Facility: CLINIC | Age: 29
End: 2024-12-18

## 2025-01-09 ENCOUNTER — NON-APPOINTMENT (OUTPATIENT)
Age: 30
End: 2025-01-09

## 2025-05-05 ENCOUNTER — NON-APPOINTMENT (OUTPATIENT)
Age: 30
End: 2025-05-05

## 2025-06-27 ENCOUNTER — APPOINTMENT (OUTPATIENT)
Age: 30
End: 2025-06-27
Payer: MEDICAID

## 2025-06-27 ENCOUNTER — LABORATORY RESULT (OUTPATIENT)
Age: 30
End: 2025-06-27

## 2025-06-27 VITALS
HEART RATE: 89 BPM | HEIGHT: 65 IN | BODY MASS INDEX: 45.15 KG/M2 | DIASTOLIC BLOOD PRESSURE: 24 MMHG | WEIGHT: 271 LBS | SYSTOLIC BLOOD PRESSURE: 114 MMHG

## 2025-06-27 PROBLEM — E66.01 OBESITY, MORBID (MORE THAN 100 LBS OVER IDEAL WEIGHT OR BMI > 40): Status: ACTIVE | Noted: 2025-06-27

## 2025-06-27 PROBLEM — Z11.3 ROUTINE SCREENING FOR STI (SEXUALLY TRANSMITTED INFECTION): Status: ACTIVE | Noted: 2025-06-27

## 2025-06-27 PROBLEM — Z01.419 ENCOUNTER FOR ANNUAL ROUTINE GYNECOLOGICAL EXAMINATION: Status: ACTIVE | Noted: 2025-06-27

## 2025-06-27 PROCEDURE — 99395 PREV VISIT EST AGE 18-39: CPT

## 2025-06-30 ENCOUNTER — EMERGENCY (EMERGENCY)
Facility: HOSPITAL | Age: 30
LOS: 0 days | Discharge: HOME | End: 2025-06-30
Attending: EMERGENCY MEDICINE
Payer: MEDICAID

## 2025-06-30 VITALS
HEART RATE: 104 BPM | TEMPERATURE: 99 F | WEIGHT: 272.27 LBS | SYSTOLIC BLOOD PRESSURE: 139 MMHG | HEIGHT: 65 IN | OXYGEN SATURATION: 99 % | RESPIRATION RATE: 18 BRPM | DIASTOLIC BLOOD PRESSURE: 99 MMHG

## 2025-06-30 DIAGNOSIS — R00.0 TACHYCARDIA, UNSPECIFIED: ICD-10-CM

## 2025-06-30 DIAGNOSIS — O26.851 SPOTTING COMPLICATING PREGNANCY, FIRST TRIMESTER: ICD-10-CM

## 2025-06-30 DIAGNOSIS — O36.80X0 PREGNANCY WITH INCONCLUSIVE FETAL VIABILITY, NOT APPLICABLE OR UNSPECIFIED: ICD-10-CM

## 2025-06-30 DIAGNOSIS — Z3A.01 LESS THAN 8 WEEKS GESTATION OF PREGNANCY: ICD-10-CM

## 2025-06-30 DIAGNOSIS — Z98.890 OTHER SPECIFIED POSTPROCEDURAL STATES: Chronic | ICD-10-CM

## 2025-06-30 DIAGNOSIS — O99.891 OTHER SPECIFIED DISEASES AND CONDITIONS COMPLICATING PREGNANCY: ICD-10-CM

## 2025-06-30 LAB
ALBUMIN SERPL ELPH-MCNC: 4.5 G/DL — SIGNIFICANT CHANGE UP (ref 3.5–5.2)
ALP SERPL-CCNC: 76 U/L — SIGNIFICANT CHANGE UP (ref 30–115)
ALT FLD-CCNC: 72 U/L — HIGH (ref 0–41)
ANION GAP SERPL CALC-SCNC: 15 MMOL/L — HIGH (ref 7–14)
APPEARANCE UR: CLEAR — SIGNIFICANT CHANGE UP
AST SERPL-CCNC: 39 U/L — SIGNIFICANT CHANGE UP (ref 0–41)
BACTERIA # UR AUTO: NEGATIVE /HPF — SIGNIFICANT CHANGE UP
BASOPHILS # BLD AUTO: 0.03 K/UL — SIGNIFICANT CHANGE UP (ref 0–0.2)
BASOPHILS NFR BLD AUTO: 0.3 % — SIGNIFICANT CHANGE UP (ref 0–1)
BILIRUB SERPL-MCNC: <0.2 MG/DL — SIGNIFICANT CHANGE UP (ref 0.2–1.2)
BILIRUB UR-MCNC: NEGATIVE — SIGNIFICANT CHANGE UP
BUN SERPL-MCNC: 15 MG/DL — SIGNIFICANT CHANGE UP (ref 10–20)
CALCIUM SERPL-MCNC: 9.1 MG/DL — SIGNIFICANT CHANGE UP (ref 8.4–10.5)
CAST: 0 /LPF — SIGNIFICANT CHANGE UP (ref 0–4)
CHLORIDE SERPL-SCNC: 101 MMOL/L — SIGNIFICANT CHANGE UP (ref 98–110)
CO2 SERPL-SCNC: 22 MMOL/L — SIGNIFICANT CHANGE UP (ref 17–32)
COLOR SPEC: YELLOW — SIGNIFICANT CHANGE UP
CREAT SERPL-MCNC: 0.9 MG/DL — SIGNIFICANT CHANGE UP (ref 0.7–1.5)
DIFF PNL FLD: ABNORMAL
EGFR: 87 ML/MIN/1.73M2 — SIGNIFICANT CHANGE UP
EGFR: 87 ML/MIN/1.73M2 — SIGNIFICANT CHANGE UP
EOSINOPHIL # BLD AUTO: 0.09 K/UL — SIGNIFICANT CHANGE UP (ref 0–0.7)
EOSINOPHIL NFR BLD AUTO: 0.9 % — SIGNIFICANT CHANGE UP (ref 0–8)
GLUCOSE SERPL-MCNC: 101 MG/DL — HIGH (ref 70–99)
GLUCOSE UR QL: NEGATIVE MG/DL — SIGNIFICANT CHANGE UP
HCG SERPL-ACNC: 9.4 MIU/ML — HIGH
HCT VFR BLD CALC: 41.8 % — SIGNIFICANT CHANGE UP (ref 37–47)
HGB BLD-MCNC: 13.9 G/DL — SIGNIFICANT CHANGE UP (ref 12–16)
IMM GRANULOCYTES NFR BLD AUTO: 0.5 % — HIGH (ref 0.1–0.3)
KETONES UR QL: 15 MG/DL
LEUKOCYTE ESTERASE UR-ACNC: NEGATIVE — SIGNIFICANT CHANGE UP
LYMPHOCYTES # BLD AUTO: 3.49 K/UL — HIGH (ref 1.2–3.4)
LYMPHOCYTES # BLD AUTO: 33.8 % — SIGNIFICANT CHANGE UP (ref 20.5–51.1)
MCHC RBC-ENTMCNC: 29.1 PG — SIGNIFICANT CHANGE UP (ref 27–31)
MCHC RBC-ENTMCNC: 33.3 G/DL — SIGNIFICANT CHANGE UP (ref 32–37)
MCV RBC AUTO: 87.6 FL — SIGNIFICANT CHANGE UP (ref 81–99)
MONOCYTES # BLD AUTO: 0.54 K/UL — SIGNIFICANT CHANGE UP (ref 0.1–0.6)
MONOCYTES NFR BLD AUTO: 5.2 % — SIGNIFICANT CHANGE UP (ref 1.7–9.3)
NEUTROPHILS # BLD AUTO: 6.14 K/UL — SIGNIFICANT CHANGE UP (ref 1.4–6.5)
NEUTROPHILS NFR BLD AUTO: 59.3 % — SIGNIFICANT CHANGE UP (ref 42.2–75.2)
NITRITE UR-MCNC: NEGATIVE — SIGNIFICANT CHANGE UP
NRBC BLD AUTO-RTO: 0 /100 WBCS — SIGNIFICANT CHANGE UP (ref 0–0)
PH UR: 5.5 — SIGNIFICANT CHANGE UP (ref 5–8)
PLATELET # BLD AUTO: 253 K/UL — SIGNIFICANT CHANGE UP (ref 130–400)
PMV BLD: 11.7 FL — HIGH (ref 7.4–10.4)
POTASSIUM SERPL-MCNC: 4 MMOL/L — SIGNIFICANT CHANGE UP (ref 3.5–5)
POTASSIUM SERPL-SCNC: 4 MMOL/L — SIGNIFICANT CHANGE UP (ref 3.5–5)
PROT SERPL-MCNC: 7 G/DL — SIGNIFICANT CHANGE UP (ref 6–8)
PROT UR-MCNC: SIGNIFICANT CHANGE UP MG/DL
RBC # BLD: 4.77 M/UL — SIGNIFICANT CHANGE UP (ref 4.2–5.4)
RBC # FLD: 13.3 % — SIGNIFICANT CHANGE UP (ref 11.5–14.5)
RBC CASTS # UR COMP ASSIST: 3 /HPF — SIGNIFICANT CHANGE UP (ref 0–4)
SODIUM SERPL-SCNC: 138 MMOL/L — SIGNIFICANT CHANGE UP (ref 135–146)
SP GR SPEC: 1.03 — SIGNIFICANT CHANGE UP (ref 1–1.03)
SQUAMOUS # UR AUTO: 1 /HPF — SIGNIFICANT CHANGE UP (ref 0–5)
UROBILINOGEN FLD QL: 0.2 MG/DL — SIGNIFICANT CHANGE UP (ref 0.2–1)
WBC # BLD: 10.34 K/UL — SIGNIFICANT CHANGE UP (ref 4.8–10.8)
WBC # FLD AUTO: 10.34 K/UL — SIGNIFICANT CHANGE UP (ref 4.8–10.8)
WBC UR QL: 1 /HPF — SIGNIFICANT CHANGE UP (ref 0–5)

## 2025-06-30 PROCEDURE — 80053 COMPREHEN METABOLIC PANEL: CPT

## 2025-06-30 PROCEDURE — 36415 COLL VENOUS BLD VENIPUNCTURE: CPT

## 2025-06-30 PROCEDURE — 85025 COMPLETE CBC W/AUTO DIFF WBC: CPT

## 2025-06-30 PROCEDURE — 76817 TRANSVAGINAL US OBSTETRIC: CPT | Mod: 26

## 2025-06-30 PROCEDURE — 36000 PLACE NEEDLE IN VEIN: CPT

## 2025-06-30 PROCEDURE — 84702 CHORIONIC GONADOTROPIN TEST: CPT

## 2025-06-30 PROCEDURE — 99284 EMERGENCY DEPT VISIT MOD MDM: CPT | Mod: 25

## 2025-06-30 PROCEDURE — 99283 EMERGENCY DEPT VISIT LOW MDM: CPT

## 2025-06-30 PROCEDURE — 81001 URINALYSIS AUTO W/SCOPE: CPT

## 2025-06-30 PROCEDURE — 76817 TRANSVAGINAL US OBSTETRIC: CPT

## 2025-06-30 PROCEDURE — 99285 EMERGENCY DEPT VISIT HI MDM: CPT

## 2025-06-30 RX ADMIN — Medication 1000 MILLILITER(S): at 19:36

## 2025-06-30 NOTE — CONSULT NOTE ADULT - ASSESSMENT
A/P: 30y  at 6w5d with PUL, likely SAB, stable    - No acute GYN intervention  - Pt to follow up [facility] on   - Bleeding and ectopic precautions reinforced  - Pt has appt to follow up with Dr. Houser in 2 weeks  - Cleared by gyn, dispo per ED    D/w Dr. Houser

## 2025-06-30 NOTE — ED PROVIDER NOTE - PATIENT PORTAL LINK FT
You can access the FollowMyHealth Patient Portal offered by [facility] by registering at the following website: [email or web address]. By joining [employer]’s FollowMyHealth portal, you will also be able to view your health information using other applications (apps) compatible with our system.

## 2025-06-30 NOTE — ED PROVIDER NOTE - NSFOLLOWUPINSTRUCTIONS_ED_ALL_ED_FT
Pregnancy of unknown of location    You are diagnosed with pregnancy of unknown location and unable to rule out ectopic pregnancy. Your vaginal bleeding could be related to early pregnancy, threatened miscarriage or ectopic pregnancy. please follow up with your primary GYN within 48-72 hours for repeat blood test (Beta HCG) level. If you are having more pain and vaginal bleeding (greater than 1 pad in an hour), please return to ED immediately. Follow other instruction below for return precaution of ectopic pregnancy.       Cannot rule Ectopic Pregnancy    WHAT YOU NEED TO KNOW:    Ectopic pregnancy occurs when a fertilized egg attaches and begins to grow outside of the uterus. The most common place for this to happen is in the fallopian tube. This is sometimes called a tubal pregnancy. The egg can also implant on the outside of the uterus, on the ovary or cervix, or in the abdomen. The egg may begin to grow, but the pregnancy cannot continue normally. Ectopic pregnancy can cause heavy bleeding and may be life-threatening.Ectopic Pregnancy         DISCHARGE INSTRUCTIONS:    Call your local emergency number (911 in the ) if:     You have chest pain or trouble breathing.        Call your doctor if:     You have sharp pain in your lower abdomen that is severe and starts suddenly.      You feel lightheaded or like you are going to faint.      You have increasing abdominal or pelvic pain or heavy vaginal bleeding.      You have shoulder pain.      You have a fever.      You have questions or concerns about your condition or care.    Medicines: You may need any of the following:     Prescription pain medicine may be given. Ask your healthcare provider how to take this medicine safely. Some prescription pain medicines contain acetaminophen. Do not take other medicines that contain acetaminophen without talking to your healthcare provider. Too much acetaminophen may cause liver damage. Prescription pain medicine may cause constipation. Ask your healthcare provider how to prevent or treat constipation.       Acetaminophen decreases pain and fever. It is available without a doctor's order. Ask how much to take and how often to take it. Follow directions. Read the labels of all other medicines you are using to see if they also contain acetaminophen, or ask your doctor or pharmacist. Acetaminophen can cause liver damage if not taken correctly. Do not use more than 4 grams (4,000 milligrams) total of acetaminophen in one day.       Take your medicine as directed. Contact your healthcare provider if you think your medicine is not helping or if you have side effects. Tell him or her if you are allergic to any medicine. Keep a list of the medicines, vitamins, and herbs you take. Include the amounts, and when and why you take them. Bring the list or the pill bottles to follow-up visits. Carry your medicine list with you in case of an emergency.    If you received methotrexate:     Do not have sex, and limit physical activity. Heavy physical activity or sexual intercourse may cause the ectopic pregnancy to rupture. This can be life-threatening. Your healthcare provider will tell you when it is okay to have sex and return to your normal activities.      Do not get pregnant until your healthcare provider says it is okay. Methotrexate will be harmful to an unborn baby. You will need to wait until at least 1 monthly cycle after you finish the methotrexate course to get pregnant. Your provider may want you to wait until after you have had 3 monthly cycles. This will help make sure all the methotrexate is out of your body.      Avoid folic acid and NSAID medicines. Folic acid, and NSAIDs, such as ibuprofen, prevent methotrexate from working correctly. Do not take folic acid supplements or have foods that are high in folic acid. Examples include orange juice, breakfast cereal, and leafy green vegetables. Your healthcare provider can give you more information on foods to avoid.      You may have some spotting and abdominal pain. This may start a few days after you begin taking methotrexate. These are normal and should only last a short time. Talk to your healthcare provider if you have any concerns.      Limit sunlight exposure. Sunlight can cause a condition caused methotrexate dermatitis (skin irritation).    For support and more information:     The [employer]  [address] [address]  [place] [address]  Phone: 1-327.101.4301  Phone: 1-172.265.7362  Web Address: http://www.acog.org      Follow up with your gynecologist as directed: You may need to return for a follow-up exam, treatment, or blood tests. If you received methotrexate to stop your pregnancy, it is important to come in for follow-up tests. Write down your questions so you remember to ask them during your visits.

## 2025-06-30 NOTE — ED PROVIDER NOTE - ATTENDING APP SHARED VISIT CONTRIBUTION OF CARE
30F  @ ~6 wks no pmh p/w vag bleeding x 2d. Sm amt of blood/spotting. Denies abd pain or urinary sx. Was seen at OBG clinic 3d prior, +preg, had pap smear done @ that time, no US yet due to early gestation. Denies dizziness, nv, abd pain, flank pain.    PE:  nad  skin warm, dry  ncat  neck supple  tachy 100s reg rhythm nl s1s2 no mrg  ctab no wrr  abd soft ntnd no palpable masses no rgr  back non-tender no cvat  ext no cce dpi  neuro aaox3 grossly nf exam

## 2025-06-30 NOTE — ED PROVIDER NOTE - CARE PROVIDER_API CALL
Gurdeep Houser  Obstetrics & Gynecology  [address] [place] [id number]  Phone: [phone]  Fax: [phone]  Follow Up Time:

## 2025-06-30 NOTE — ED PROVIDER NOTE - CLINICAL SUMMARY MEDICAL DECISION MAKING FREE TEXT BOX
Labs, EKG and imaging were ordered, where indicated.  Independent interpretation of any labs, EKG & imaging that was ordered was performed by me, Dr. Flowers. Appropriate medications for patient's presenting complaints were ordered and effects were reassessed, where indicated.  Patient's records (prior hospital, ED visit, and/or nursing home note) were reviewed, if available.  Additional history was obtained from EMS, family, and/or PCP (where available).  Escalation to admission/observation was considered.  However patient feels much better and patient/parent is comfortable with discharge.  Appropriate follow-up was arranged.     PUL - ob consulted, pt advised to obtain repeat hcg level 7/2, op f/u with obgyn clinic Dr. Lawrence in 2 wks - all results d/w pt & copies given, strict return precautions discussed, rec outpt repeat labs & obgyn f/u

## 2025-06-30 NOTE — ED PROVIDER NOTE - OBJECTIVE STATEMENT
30 years old f G2, P1 female presenting complaint of vaginal spotting started yesterday with vaginal bleeding today.  Only used 1 pad since the morning.  Reports small amount of clots.  Denies abdominal cramping or pain associated vaginal bleeding.  Patient also reports she was seen at OB/GYN clinic 3 days ago and had Pap smear done at the time.  Has not had a sonogram done yet because of early pregnancy.  Patient otherwise denies fever, chills, recent illness, chest pain, abdominal pain, flank pain, nausea and vomiting.

## 2025-06-30 NOTE — CONSULT NOTE ADULT - SUBJECTIVE AND OBJECTIVE BOX
PGY3 Note    Chief Complaint: vaginal spotting     HPI: 30y  at 6w5d by LMP  presenting with complaints of vaginal spotting that started yesterday night with passage of brown discharge. She denies cramping or any abdominal pain. She has used 2 pads since yesterday. This is an unplanned but desired pregnancy. Of note, last delivery was in 2024,  section.     Ob/Gyn History:                   LMP -                   Cycle Length - regular  H/o TOPx2 with medication, no D&C, c/s x1, FT.   Denies history of ovarian cysts, uterine fibroids, abnormal paps, or STIs    Denies the following: constitutional symptoms, visual symptoms, cardiovascular symptoms, respiratory symptoms, GI symptoms, musculoskeletal symptoms, skin symptoms, neurologic symptoms, hematologic symptoms, allergic symptoms, psychiatric symptoms except any pertinent positives listed.     Home Medications:  acetaminophen 325 mg oral tablet: 3 tab(s) orally every 6 hours as needed for pain (26 Oct 2024 18:47)  ibuprofen 600 mg oral tablet: 1 tab(s) orally every 6 hours as needed for pain (26 Oct 2024 18:47)  Prenatal Multivitamins with Folic Acid 1 mg oral tablet: 1 tab(s) orally once a day (25 Oct 2024 06:23)  simethicone 80 mg oral tablet, chewable: 1 tab(s) orally every 4 hours (25 Oct 2024 06:23)    Allergies: No Known Allergies    PAST MEDICAL & SURGICAL HISTORY:  No pertinent past medical history  H/O endoscopy  stent placement in duct of gallbladder  S/P cholecystectomy    FAMILY HISTORY:  No pertinent family history in first degree relatives    SOCIAL HISTORY: Denies cigarette use, alcohol use, or illicit drug use    Vital Signs Last 24 Hrs  T(F): 98.6 (2025 18:51), Max: 98.6 (2025 18:51)  HR: 104 (2025 18:51) (104 - 104)  BP: 139/99 (2025 18:51) (139/99 - 139/99)  RR: 18 (2025 18:51) (18 - 18)  Height (cm): 165.1 (25 @ 18:51)  Weight (kg): 123.5 (25 @ 18:51)  BMI (kg/m2): 45.3 (25 @ 18:51)  BSA (m2): 2.26 (25 @ 18:51)    General Appearance - AAOx3, NAD  Abdomen - Soft, nontender, nondistended, no rebound, no rigidity, no guarding    GYN/Pelvis:    Labia Majora - Normal  Labia Minora - Normal  Clitoris - Normal  Urethra - Normal  Vagina - Normal, 5cc blood in vault, no active bleeding, no bleeding on valsalva   Cervix - Normal, closed    Uterus:  Size - Normal  Tenderness - None  Mass - None  Freely mobile    Adnexa:  Masses - None  Tenderness - None      Meds:   sodium chloride 0.9% Bolus 1000 milliLiter(s) IV Bolus once    Height (cm): 165.1 (25 @ 18:51)  Weight (kg): 123.5 (25 @ 18:51)  BMI (kg/m2): 45.3 (25 @ 18:51)  BSA (m2): 2.26 (25 @ 18:51)    LABS:                        13.9   10.34 )-----------( 253      ( 2025 20:15 )             41.8     HCG Quantitative, Serum: 9.4 mIU/mL (25 @ 20:15)          138  |  101  |  15  ----------------------------<  101[H]  4.0   |  22  |  0.9    Ca    9.1      2025 20:15    TPro  7.0  /  Alb  4.5  /  TBili  <0.2  /  DBili  x   /  AST  39  /  ALT  72[H]  /  AlkPhos  76        Urinalysis Basic - ( 2025 20:15 )    Color: Yellow / Appearance: Clear / S.026 / pH: x  Gluc: 101 mg/dL / Ketone: x  / Bili: Negative / Urobili: 0.2 mg/dL   Blood: x / Protein: Trace mg/dL / Nitrite: Negative   Leuk Esterase: Negative / RBC: 3 /HPF / WBC 1 /HPF   Sq Epi: x / Non Sq Epi: 1 /HPF / Bacteria: Negative /HPF      RADIOLOGY & ADDITIONAL STUDIES:  < from: US Transvaginal, OB (25 @ 20:02) >  ACC: [id number] EXAM:  US OB TRANSVAGINAL   ORDERED BY: LEE ANN ELENA     PROCEDURE DATE:  2025          INTERPRETATION:  CLINICAL INFORMATION: Vaginal bleeding    LMP: 2025    COMPARISON: None available.    TECHNIQUE: Endovaginal pelvic sonogram as per order. Color and Spectral   Doppler was performed.    FINDINGS:  The uterus measures 9.2 x 3.9 x 5.2 cm. No intrauterine pregnancy   identified  Right ovary: 2.5 cm x 1.3 cm x 2.0 cm.  Left ovary: 2.8 cm x 1.8 cm x 2.3 cm.    Vascular flowdemonstrated to both ovaries    Fluid: None.    IMPRESSION:  No intrauterine pregnancy identified. Close interval follow-up with   serial ultrasound and beta hCG levels is recommended.    --- End of Report ---    < end of copied text >   PGY3 Note    Chief Complaint: vaginal spotting     HPI: 30y  at 6w5d by LMP  presenting with complaints of vaginal spotting that started yesterday night with passage of brown discharge. She denies cramping or any abdominal pain. She has used 2 pads since yesterday. This is an unplanned but desired pregnancy. Of note, last delivery was in 2024,  section. She states that her primary OBGYN is Dr. Houser     Ob/Gyn History:                   LMP -                   Cycle Length - regular  H/o TOPx2 with medication, no D&C, c/s x1, FT.   Denies history of ovarian cysts, uterine fibroids, abnormal paps, or STIs    Denies the following: constitutional symptoms, visual symptoms, cardiovascular symptoms, respiratory symptoms, GI symptoms, musculoskeletal symptoms, skin symptoms, neurologic symptoms, hematologic symptoms, allergic symptoms, psychiatric symptoms except any pertinent positives listed.     Home Medications:  acetaminophen 325 mg oral tablet: 3 tab(s) orally every 6 hours as needed for pain (26 Oct 2024 18:47)  ibuprofen 600 mg oral tablet: 1 tab(s) orally every 6 hours as needed for pain (26 Oct 2024 18:47)  Prenatal Multivitamins with Folic Acid 1 mg oral tablet: 1 tab(s) orally once a day (25 Oct 2024 06:23)  simethicone 80 mg oral tablet, chewable: 1 tab(s) orally every 4 hours (25 Oct 2024 06:23)    Allergies: No Known Allergies    PAST MEDICAL & SURGICAL HISTORY:  No pertinent past medical history  H/O endoscopy  stent placement in duct of gallbladder  S/P cholecystectomy    FAMILY HISTORY:  No pertinent family history in first degree relatives    SOCIAL HISTORY: Denies cigarette use, alcohol use, or illicit drug use    Vital Signs Last 24 Hrs  T(F): 98.6 (2025 18:51), Max: 98.6 (2025 18:51)  HR: 104 (2025 18:51) (104 - 104)  BP: 139/99 (2025 18:51) (139/99 - 139/99)  RR: 18 (2025 18:51) (18 - 18)  Height (cm): 165.1 (25 @ 18:51)  Weight (kg): 123.5 (25 @ 18:51)  BMI (kg/m2): 45.3 (25 @ 18:51)  BSA (m2): 2.26 (25 @ 18:51)    General Appearance - AAOx3, NAD  Abdomen - Soft, nontender, nondistended, no rebound, no rigidity, no guarding    GYN/Pelvis:    Labia Majora - Normal  Labia Minora - Normal  Clitoris - Normal  Urethra - Normal  Vagina - Normal, 5cc blood in vault, no active bleeding, no bleeding on valsalva   Cervix - Normal, closed    Uterus:  Size - Normal  Tenderness - None  Mass - None  Freely mobile    Adnexa:  Masses - None  Tenderness - None      Meds:   sodium chloride 0.9% Bolus 1000 milliLiter(s) IV Bolus once    Height (cm): 165.1 (25 @ 18:51)  Weight (kg): 123.5 (25 @ 18:51)  BMI (kg/m2): 45.3 (25 @ 18:51)  BSA (m2): 2.26 (25 @ 18:51)    LABS:                        13.9   10.34 )-----------( 253      ( 2025 20:15 )             41.8     HCG Quantitative, Serum: 9.4 mIU/mL (25 @ 20:15)          138  |  101  |  15  ----------------------------<  101[H]  4.0   |  22  |  0.9    Ca    9.1      2025 20:15    TPro  7.0  /  Alb  4.5  /  TBili  <0.2  /  DBili  x   /  AST  39  /  ALT  72[H]  /  AlkPhos  76        Urinalysis Basic - ( 2025 20:15 )    Color: Yellow / Appearance: Clear / S.026 / pH: x  Gluc: 101 mg/dL / Ketone: x  / Bili: Negative / Urobili: 0.2 mg/dL   Blood: x / Protein: Trace mg/dL / Nitrite: Negative   Leuk Esterase: Negative / RBC: 3 /HPF / WBC 1 /HPF   Sq Epi: x / Non Sq Epi: 1 /HPF / Bacteria: Negative /HPF      RADIOLOGY & ADDITIONAL STUDIES:  < from: US Transvaginal, OB (25 @ 20:02) >  ACC: [id number] EXAM:  US OB TRANSVAGINAL   ORDERED BY: LEE ANN ELENA     PROCEDURE DATE:  2025          INTERPRETATION:  CLINICAL INFORMATION: Vaginal bleeding    LMP: 2025    COMPARISON: None available.    TECHNIQUE: Endovaginal pelvic sonogram as per order. Color and Spectral   Doppler was performed.    FINDINGS:  The uterus measures 9.2 x 3.9 x 5.2 cm. No intrauterine pregnancy   identified  Right ovary: 2.5 cm x 1.3 cm x 2.0 cm.  Left ovary: 2.8 cm x 1.8 cm x 2.3 cm.    Vascular flowdemonstrated to both ovaries    Fluid: None.    IMPRESSION:  No intrauterine pregnancy identified. Close interval follow-up with   serial ultrasound and beta hCG levels is recommended.    --- End of Report ---    < end of copied text >

## 2025-06-30 NOTE — ED PROVIDER NOTE - PHYSICAL EXAMINATION
CONSTITUTIONAL: Well-appearing;  in no apparent distress.   EYES: PERRL; EOM intact.   CARDIOVASCULAR: Normal S1, S2; no murmurs, rubs, or gallops.   RESPIRATORY: Normal chest excursion with respiration; breath sounds clear and equal bilaterally; no wheezes, rhonchi, or rales.  GI/: Normal bowel sounds; non-distended; non-tender; no palpable organomegaly.   MS: No calf swelling and tenderness.  SKIN: Normal for age and race; warm; dry; good turgor; no apparent lesions or exudate.   NEURO/PSYCH: A & O x 4; grossly unremarkable.

## 2025-07-02 ENCOUNTER — OUTPATIENT (OUTPATIENT)
Dept: OUTPATIENT SERVICES | Facility: HOSPITAL | Age: 30
LOS: 1 days | End: 2025-07-02
Payer: MEDICAID

## 2025-07-02 DIAGNOSIS — O36.80X0 PREGNANCY WITH INCONCLUSIVE FETAL VIABILITY, NOT APPLICABLE OR UNSPECIFIED: ICD-10-CM

## 2025-07-02 DIAGNOSIS — Z98.890 OTHER SPECIFIED POSTPROCEDURAL STATES: Chronic | ICD-10-CM

## 2025-07-02 LAB — HPV HIGH+LOW RISK DNA PNL CVX: DETECTED

## 2025-07-02 PROCEDURE — 36415 COLL VENOUS BLD VENIPUNCTURE: CPT

## 2025-07-02 PROCEDURE — 84702 CHORIONIC GONADOTROPIN TEST: CPT

## 2025-07-03 DIAGNOSIS — O36.80X0 PREGNANCY WITH INCONCLUSIVE FETAL VIABILITY, NOT APPLICABLE OR UNSPECIFIED: ICD-10-CM

## 2025-07-04 LAB
C TRACH RRNA SPEC QL NAA+PROBE: NOT DETECTED
N GONORRHOEA RRNA SPEC QL NAA+PROBE: NOT DETECTED
SOURCE TP AMPLIFICATION: NORMAL

## 2025-07-05 NOTE — CHART NOTE - NSCHARTNOTEFT_GEN_A_CORE
PGY2 Note    Pt called to assess wellbeing and to update her on bhcg value. Pt unable to be reached, voicemail unable to be left.
PGY 2 BETA SUMMARY    HPI from ED visit    30y  at 6w5d by LMP  presenting with complaints of vaginal spotting that started yesterday night with passage of brown discharge. She denies cramping or any abdominal pain. She has used 2 pads since yesterday. This is an unplanned but desired pregnancy. Of note, last delivery was in 2024,  section. She states that her primary OBGYN is Dr. Houser     Labs showed:    10.34>13.9/41.8<253, 138/4/101/22/15/0.9<101, AST/ALT 39/72, A pos    Imaging    TVUS: FINDINGS: The uterus measures 9.2 x 3.9 x 5.2 cm. No intrauterine pregnancy identified Right ovary: 2.5 cm x 1.3 cm x 2.0 cm.  Left ovary: 2.8 cm x 1.8 cm x 2.3 cm. Vascular flow demonstrated to both ovaries Fluid: None. IMPRESSION: No intrauterine pregnancy identified. Close interval follow-up with serial ultrasound and beta hCG levels is recommended."    She was assessed as:   30y  at 6w5d with PUL, likely SAB, stable    Instructed to obtained serial b-hCGs.     BETA TREND   9.4  7/2 1.0    Assessment   29yo  with diagnosis consistent with SAB, clinically stable, with negative bhcg dictating off beta list
PGY2 Note    Pt called to assess wellbeing and to [facility]. Pt unable to be reached, voicemail unable to be left x2 as no voicemail option available.

## 2025-07-07 ENCOUNTER — APPOINTMENT (OUTPATIENT)
Age: 30
End: 2025-07-07

## 2025-07-07 LAB — HCG SERPL-MCNC: 1 MIU/ML

## 2025-07-14 ENCOUNTER — APPOINTMENT (OUTPATIENT)
Age: 30
End: 2025-07-14

## 2025-07-14 ENCOUNTER — APPOINTMENT (OUTPATIENT)
Dept: OBGYN | Facility: CLINIC | Age: 30
End: 2025-07-14

## 2025-07-17 ENCOUNTER — APPOINTMENT (OUTPATIENT)
Dept: CARDIOLOGY | Facility: CLINIC | Age: 30
End: 2025-07-17
Payer: MEDICAID

## 2025-07-17 VITALS
HEART RATE: 72 BPM | SYSTOLIC BLOOD PRESSURE: 110 MMHG | WEIGHT: 270 LBS | HEIGHT: 65 IN | DIASTOLIC BLOOD PRESSURE: 70 MMHG | BODY MASS INDEX: 44.98 KG/M2

## 2025-07-17 PROBLEM — R00.2 PALPITATIONS: Status: ACTIVE | Noted: 2025-07-17

## 2025-07-17 PROBLEM — R06.83 SNORING: Status: ACTIVE | Noted: 2025-07-17

## 2025-07-17 PROBLEM — Z13.6 SCREENING FOR CARDIOVASCULAR CONDITION: Status: ACTIVE | Noted: 2025-07-17

## 2025-07-17 PROCEDURE — ZZZZZ: CPT

## 2025-07-17 PROCEDURE — 93000 ELECTROCARDIOGRAM COMPLETE: CPT

## 2025-07-17 PROCEDURE — 99204 OFFICE O/P NEW MOD 45 MIN: CPT | Mod: 25

## 2025-07-18 ENCOUNTER — APPOINTMENT (OUTPATIENT)
Age: 30
End: 2025-07-18

## 2025-07-24 LAB
ALBUMIN SERPL ELPH-MCNC: 4.2 G/DL
ALP BLD-CCNC: 68 U/L
ALT SERPL-CCNC: 68 U/L
ANION GAP SERPL CALC-SCNC: 12 MMOL/L
AST SERPL-CCNC: 38 U/L
BILIRUB SERPL-MCNC: 0.3 MG/DL
BUN SERPL-MCNC: 14 MG/DL
CALCIUM SERPL-MCNC: 9.1 MG/DL
CHLORIDE SERPL-SCNC: 104 MMOL/L
CHOLEST SERPL-MCNC: 183 MG/DL
CO2 SERPL-SCNC: 24 MMOL/L
CREAT SERPL-MCNC: 0.5 MG/DL
CRP SERPL HS-MCNC: 5.26 MG/L
EGFRCR SERPLBLD CKD-EPI 2021: 128 ML/MIN/1.73M2
GLUCOSE SERPL-MCNC: 112 MG/DL
HCT VFR BLD CALC: 42 %
HDLC SERPL-MCNC: 49 MG/DL
HGB BLD-MCNC: 13.7 G/DL
LDLC SERPL-MCNC: 115 MG/DL
MCHC RBC-ENTMCNC: 29.1 PG
MCHC RBC-ENTMCNC: 32.6 G/DL
MCV RBC AUTO: 89.2 FL
NONHDLC SERPL-MCNC: 134 MG/DL
PLATELET # BLD AUTO: 236 K/UL
PMV BLD AUTO: 0 /100 WBCS
PMV BLD: 11.7 FL
POTASSIUM SERPL-SCNC: 4.3 MMOL/L
PROT SERPL-MCNC: 6.9 G/DL
RBC # BLD: 4.71 M/UL
RBC # FLD: 13.6 %
SODIUM SERPL-SCNC: 140 MMOL/L
TRIGL SERPL-MCNC: 106 MG/DL
TSH SERPL-ACNC: 0.81 UIU/ML
WBC # FLD AUTO: 8.31 K/UL

## 2025-08-14 ENCOUNTER — APPOINTMENT (OUTPATIENT)
Dept: CARDIOLOGY | Facility: CLINIC | Age: 30
End: 2025-08-14
Payer: MEDICAID

## 2025-08-14 PROCEDURE — 93306 TTE W/DOPPLER COMPLETE: CPT

## 2025-08-14 PROCEDURE — 93015 CV STRESS TEST SUPVJ I&R: CPT

## 2025-08-23 ENCOUNTER — APPOINTMENT (OUTPATIENT)
Dept: SLEEP CENTER | Facility: HOSPITAL | Age: 30
End: 2025-08-23

## 2025-08-23 ENCOUNTER — OUTPATIENT (OUTPATIENT)
Dept: OUTPATIENT SERVICES | Facility: HOSPITAL | Age: 30
LOS: 1 days | Discharge: HOME | End: 2025-08-23
Payer: MEDICAID

## 2025-08-23 DIAGNOSIS — G47.33 OBSTRUCTIVE SLEEP APNEA (ADULT) (PEDIATRIC): ICD-10-CM

## 2025-08-23 DIAGNOSIS — Z98.890 OTHER SPECIFIED POSTPROCEDURAL STATES: Chronic | ICD-10-CM

## 2025-08-23 PROCEDURE — 95810 POLYSOM 6/> YRS 4/> PARAM: CPT | Mod: 26

## 2025-08-23 PROCEDURE — 95810 POLYSOM 6/> YRS 4/> PARAM: CPT

## 2025-08-26 DIAGNOSIS — G47.33 OBSTRUCTIVE SLEEP APNEA (ADULT) (PEDIATRIC): ICD-10-CM

## 2025-09-10 ENCOUNTER — APPOINTMENT (OUTPATIENT)
Dept: CARDIOLOGY | Facility: CLINIC | Age: 30
End: 2025-09-10
Payer: MEDICAID

## 2025-09-10 PROCEDURE — 99212 OFFICE O/P EST SF 10 MIN: CPT | Mod: 93
